# Patient Record
Sex: FEMALE | Race: WHITE | Employment: UNEMPLOYED | URBAN - METROPOLITAN AREA
[De-identification: names, ages, dates, MRNs, and addresses within clinical notes are randomized per-mention and may not be internally consistent; named-entity substitution may affect disease eponyms.]

---

## 2017-01-16 RX ORDER — DEXTROAMPHETAMINE SACCHARATE, AMPHETAMINE ASPARTATE MONOHYDRATE, DEXTROAMPHETAMINE SULFATE AND AMPHETAMINE SULFATE 6.25; 6.25; 6.25; 6.25 MG/1; MG/1; MG/1; MG/1
25 CAPSULE, EXTENDED RELEASE ORAL
Qty: 30 CAP | Refills: 0 | Status: SHIPPED | OUTPATIENT
Start: 2017-01-16 | End: 2017-01-16 | Stop reason: SDUPTHER

## 2017-01-16 RX ORDER — DEXTROAMPHETAMINE SACCHARATE, AMPHETAMINE ASPARTATE MONOHYDRATE, DEXTROAMPHETAMINE SULFATE AND AMPHETAMINE SULFATE 6.25; 6.25; 6.25; 6.25 MG/1; MG/1; MG/1; MG/1
25 CAPSULE, EXTENDED RELEASE ORAL
Qty: 30 CAP | Refills: 0 | Status: SHIPPED | OUTPATIENT
Start: 2017-01-16 | End: 2017-02-17 | Stop reason: SDUPTHER

## 2017-01-16 NOTE — TELEPHONE ENCOUNTER
Last filled 12/16/16  Reviewed report generated by the [place]. Does not demonstrate aberrancies or inconsistencies with regard to the prescribing of controlled medications to this patient by other providers.

## 2017-02-17 RX ORDER — DEXTROAMPHETAMINE SACCHARATE, AMPHETAMINE ASPARTATE MONOHYDRATE, DEXTROAMPHETAMINE SULFATE AND AMPHETAMINE SULFATE 6.25; 6.25; 6.25; 6.25 MG/1; MG/1; MG/1; MG/1
25 CAPSULE, EXTENDED RELEASE ORAL
Qty: 30 CAP | Refills: 0 | Status: SHIPPED | OUTPATIENT
Start: 2017-02-17 | End: 2017-02-20 | Stop reason: SDUPTHER

## 2017-02-17 NOTE — TELEPHONE ENCOUNTER
I have refilled today but she will need appt before additional refills. She is overdue and Dr. Maya Pemberton mailed letter.

## 2017-02-20 RX ORDER — DEXTROAMPHETAMINE SACCHARATE, AMPHETAMINE ASPARTATE MONOHYDRATE, DEXTROAMPHETAMINE SULFATE AND AMPHETAMINE SULFATE 6.25; 6.25; 6.25; 6.25 MG/1; MG/1; MG/1; MG/1
25 CAPSULE, EXTENDED RELEASE ORAL
Qty: 30 CAP | Refills: 0 | Status: SHIPPED | OUTPATIENT
Start: 2017-02-20 | End: 2017-03-21 | Stop reason: SDUPTHER

## 2017-02-20 RX ORDER — DEXTROAMPHETAMINE SACCHARATE, AMPHETAMINE ASPARTATE MONOHYDRATE, DEXTROAMPHETAMINE SULFATE AND AMPHETAMINE SULFATE 6.25; 6.25; 6.25; 6.25 MG/1; MG/1; MG/1; MG/1
25 CAPSULE, EXTENDED RELEASE ORAL
Qty: 30 CAP | Refills: 0 | Status: SHIPPED | OUTPATIENT
Start: 2017-02-20 | End: 2017-02-20 | Stop reason: ALTCHOICE

## 2017-03-22 RX ORDER — DEXTROAMPHETAMINE SACCHARATE, AMPHETAMINE ASPARTATE MONOHYDRATE, DEXTROAMPHETAMINE SULFATE AND AMPHETAMINE SULFATE 6.25; 6.25; 6.25; 6.25 MG/1; MG/1; MG/1; MG/1
25 CAPSULE, EXTENDED RELEASE ORAL
Qty: 30 CAP | Refills: 0 | Status: SHIPPED | OUTPATIENT
Start: 2017-03-22 | End: 2017-04-20 | Stop reason: SDUPTHER

## 2017-03-22 NOTE — TELEPHONE ENCOUNTER
Reviewed report generated by the [facility]. Does not demonstrate aberrancies or inconsistencies with regard to the prescribing of controlled medications to this patient by other providers.   Last filled 2/20/17    I will remind her to make an appt, her last OV was 4/2016 with Marcelo Childers

## 2017-03-22 NOTE — TELEPHONE ENCOUNTER
From: Mimi Tanner  To: Muna Escalera MD  Sent: 3/21/2017 10:49 AM EDT  Subject: Medication Renewal Request    Original authorizing provider: MD Mimi Aguilera would like a refill of the following medications:  amphetamine-dextroamphetamine XR (ADDERALL XR) 25 mg XR capsule Muna Escalera MD]    [facility]  Please call and leave a message when it's ready. :) thanks!!!

## 2017-03-23 RX ORDER — NAPROXEN 375 MG/1
TABLET ORAL
Qty: 60 TAB | Refills: 1 | Status: SHIPPED | OUTPATIENT
Start: 2017-03-23 | End: 2017-08-23 | Stop reason: SDUPTHER

## 2017-04-21 RX ORDER — DEXTROAMPHETAMINE SACCHARATE, AMPHETAMINE ASPARTATE MONOHYDRATE, DEXTROAMPHETAMINE SULFATE AND AMPHETAMINE SULFATE 6.25; 6.25; 6.25; 6.25 MG/1; MG/1; MG/1; MG/1
25 CAPSULE, EXTENDED RELEASE ORAL
Qty: 30 CAP | Refills: 0 | Status: SHIPPED | OUTPATIENT
Start: 2017-04-21 | End: 2017-05-24 | Stop reason: SDUPTHER

## 2017-04-21 NOTE — TELEPHONE ENCOUNTER
From: Aparna Valero  To: Ruba Scanlon MD  Sent: 4/20/2017 11:11 PM EDT  Subject: Medication Renewal Request    Original authorizing provider: MD Aparna Licea would like a refill of the following medications:  amphetamine-dextroamphetamine XR (ADDERALL XR) 25 mg XR capsule Ruba Scanlon MD]    Preferred pharmacy: [facility]:  Requires written rx. Please call and leave a message when it's ready.  I also submitted a request to schedule my physical. Thanks so much!!

## 2017-04-21 NOTE — TELEPHONE ENCOUNTER
Reviewed report generated by the [facility]. Does not demonstrate aberrancies or inconsistencies with regard to the prescribing of controlled medications to this patient by other providers.   Last filled 4/23/17

## 2017-05-10 ENCOUNTER — HOSPITAL ENCOUNTER (OUTPATIENT)
Dept: LAB | Age: 37
Discharge: HOME | End: 2017-05-10
Payer: COMMERCIAL

## 2017-05-10 ENCOUNTER — OFFICE VISIT (OUTPATIENT)
Dept: INTERNAL MEDICINE CLINIC | Age: 37
End: 2017-05-10

## 2017-05-10 VITALS
HEIGHT: 69 IN | BODY MASS INDEX: 20.62 KG/M2 | HEART RATE: 88 BPM | WEIGHT: 139.2 LBS | TEMPERATURE: 98 F | SYSTOLIC BLOOD PRESSURE: 130 MMHG | RESPIRATION RATE: 12 BRPM | OXYGEN SATURATION: 98 % | DIASTOLIC BLOOD PRESSURE: 80 MMHG

## 2017-05-10 DIAGNOSIS — R82.90 ABNORMAL URINALYSIS: ICD-10-CM

## 2017-05-10 DIAGNOSIS — R82.90 ABNORMAL URINE ODOR: ICD-10-CM

## 2017-05-10 DIAGNOSIS — R30.0 DYSURIA: Primary | ICD-10-CM

## 2017-05-10 DIAGNOSIS — R30.0 DYSURIA: ICD-10-CM

## 2017-05-10 LAB
BILIRUB UR QL STRIP: NEGATIVE
GLUCOSE UR-MCNC: NEGATIVE MG/DL
KETONES P FAST UR STRIP-MCNC: NEGATIVE MG/DL
PH UR STRIP: 6 [PH] (ref 4.6–8)
PROT UR QL STRIP: NORMAL MG/DL
SP GR UR STRIP: 1.02 (ref 1–1.03)
UA UROBILINOGEN AMB POC: NORMAL (ref 0.2–1)
URINALYSIS CLARITY POC: CLEAR
URINALYSIS COLOR POC: YELLOW
URINE BLOOD POC: NORMAL
URINE LEUKOCYTES POC: NORMAL
URINE NITRITES POC: NEGATIVE

## 2017-05-10 PROCEDURE — 87186 SC STD MICRODIL/AGAR DIL: CPT | Performed by: PHYSICIAN ASSISTANT

## 2017-05-10 PROCEDURE — 87077 CULTURE AEROBIC IDENTIFY: CPT | Performed by: PHYSICIAN ASSISTANT

## 2017-05-10 PROCEDURE — 87086 URINE CULTURE/COLONY COUNT: CPT | Performed by: PHYSICIAN ASSISTANT

## 2017-05-10 RX ORDER — CIPROFLOXACIN 250 MG/1
250 TABLET, FILM COATED ORAL EVERY 12 HOURS
Qty: 6 TAB | Refills: 0 | Status: SHIPPED | OUTPATIENT
Start: 2017-05-10 | End: 2017-05-13

## 2017-05-10 NOTE — PROGRESS NOTES
HPI/History  Aidan Shields is a 40 y.o.  female who presents for evaluation. Patient reports dysuria and mild intermittent pelvic discomfort usually when urinating for about 5 days. Slightly malodorous urine initially which has now resolved. She took an OTC UTI test which was positive. She denies any significant frequency or urgency and no hematuria, overt CVA tenderness, fevers, or other constitutional sxs. Possible slight vaginal discharge but no other vaginal complaints/sxs. No sexual intercourse since last menstruation and monogamous with her  who had a vasectomy. No other sxs or complaints. Patient Active Problem List   Diagnosis Code    Palpitations R00.2    Allergic rhinitis J30.9    ADD (attention deficit disorder) F98.8    Arthralgia M25.50     Past Medical History:   Diagnosis Date    Allergic rhinitis     Palpitations      History reviewed. No pertinent surgical history. Social History     Social History    Marital status:      Spouse name: N/A    Number of children: N/A    Years of education: N/A     Occupational History    Not on file. Social History Main Topics    Smoking status: Never Smoker    Smokeless tobacco: Not on file    Alcohol use No    Drug use: Not on file    Sexual activity: Not on file     Other Topics Concern    Not on file     Social History Narrative     Family History   Problem Relation Age of Onset    Hypertension Mother      Current Outpatient Prescriptions   Medication Sig    ciprofloxacin HCl (CIPRO) 250 mg tablet Take 1 Tab by mouth every twelve (12) hours for 3 days.  amphetamine-dextroamphetamine XR (ADDERALL XR) 25 mg XR capsule Take 1 Cap (25 mg total) by mouth every morningEarliest Fill Date: 4/21/17. Max Daily Amount: 25 mg    naproxen (NAPROSYN) 375 mg tablet TAKE ONE TABLET BY MOUTH TWICE DAILY WITH MEALS    triamcinolone (NASACORT AQ) 55 mcg nasal inhaler 2 Sprays by Both Nostrils route two (2) times a day.  montelukast (SINGULAIR) 10 mg tablet TAKE 1 TABLET BY MOUTH EVERY EVENING     No current facility-administered medications for this visit. Allergies   Allergen Reactions    Cephalosporins Shortness of Breath and Rash       Review of Systems  Aside from those included in HPI, remainder of ROS negative. Physical Examination  Visit Vitals    /80 (BP 1 Location: Right arm)    Pulse 88    Temp 98 °F (36.7 °C) (Oral)    Resp 12    Ht 5' 9\" (1.753 m)    Wt 139 lb 3.2 oz (63.1 kg)    SpO2 98%    BMI 20.56 kg/m2       General - Alert and in no acute distress. Pt appears well, comfortable, and in good spirits. Pleasant, engaging. Nontoxic. Not anxious, non-diaphoretic. Mental status - Appropriate mood, behavior, speech content, dress, and thought processes. Pulm - No tachypnea, retractions, or cyanosis. Good respiratory effort. Cardiovascular - Normal rate. UA today: 2+ blood, 1+ leuk, neg nitr. Assessment and Plan  1. Dysuria, malodorous urine - UA above, will treat with cipro and send for cx. Increase fluids and other supportive measures as needed. Further planning pending results and progression. Return/call if needed or developments. Pt happily agrees with plan. Pt has not been seen in quite some time but has appt with Dr. Robb Banuelos in August, which she will keep. PLEASE NOTE:   This document has been produced using voice recognition software. Unrecognized errors in transcription may be present.     Julián Lewis [employer] [facility]  [phone]  5/10/2017

## 2017-05-10 NOTE — MR AVS SNAPSHOT
Visit Information Date & Time Provider Department Dept. Phone Encounter #  
 5/10/2017 11:30 AM C[place] Paz, [address] Internist of [facility] [phone] Your Appointments 8/3/2017  9:55 AM  
LAB with C NURSE VISIT Internist of [facility] ([address]) Appt Note: lab  
 [address], [address] [place] [address] [facility] [address] [place]  
  
   
 [address], [address]  
  
    
 8/10/2017 10:30 AM  
PHYSICAL with Tamie Edmonds MD  
Internist of [facility] [address] [place] [address]) Appt Note: physical  
 [address] [facility], [address] [place] [address] [address] [place]  
  
   
 [address], [address] Upcoming Health Maintenance Date Due DTaP/Tdap/Td series (1 - Tdap) 10/20/2005 PAP AKA CERVICAL CYTOLOGY 5/17/2016 INFLUENZA AGE 9 TO ADULT 8/1/2017 Allergies as of 5/10/2017  Review Complete On: 4/4/2016 By: JORGE LUIS Unger Severity Noted Reaction Type Reactions Cephalosporins High   Shortness of Breath, Rash Current Immunizations  Never Reviewed Name Date  
 TD Vaccine 10/19/2005 Not reviewed this visit You Were Diagnosed With   
  
 Codes Comments Dysuria    -  Primary ICD-10-CM: R30.0 ICD-9-CM: 772. 1 Abnormal urine odor     ICD-10-CM: R82.90 ICD-9-CM: 791.9 Abnormal urinalysis     ICD-10-CM: R82.90 ICD-9-CM: 791.9 Vitals BP Pulse Temp Resp Height(growth percentile) Weight(growth percentile) 130/80 (BP 1 Location: Right arm) 88 98 °F (36.7 °C) (Oral) 12 5' 9\" (1.753 m) 139 lb 3.2 oz (63.1 kg) SpO2 BMI OB Status Smoking Status 98% 20.56 kg/m2 Having regular periods Never Smoker Vitals History BMI and BSA Data Body Mass Index Body Surface Area 20.56 kg/m 2 1.75 m 2 Preferred Pharmacy Pharmacy Name Phone [facility], [address] [place] [address] [phone] Your Updated Medication List  
  
   
This list is accurate as of: 5/10/17 11:58 AM.  Always use your most recent med list.  
  
  
  
  
 amphetamine-dextroamphetamine XR 25 mg XR capsule Commonly known as:  ADDERALL XR Take 1 Cap (25 mg total) by mouth every morningEarliest Fill Date: 4/21/17. Max Daily Amount: 25 mg  
  
 montelukast 10 mg tablet Commonly known as:  SINGULAIR  
TAKE 1 TABLET BY MOUTH EVERY EVENING  
  
 naproxen 375 mg tablet Commonly known as:  NAPROSYN  
TAKE ONE TABLET BY MOUTH TWICE DAILY WITH MEALS  
  
 triamcinolone 55 mcg nasal inhaler Commonly known as:  NASACORT AQ  
2 Sprays by Both Nostrils route two (2) times a day. Introducing \[facility]\"" & HEALTH SERVICES! Dear Facundo Calloway: Thank you for requesting a [employer] account. Our records indicate that you already have an active [employer] account. You can access your account anytime at [email or web address] Did you know that you can access your hospital and ER discharge instructions at any time in [employer]? You can also review all of your test results from your hospital stay or ER visit. Additional Information If you have questions, please visit the Frequently Asked Questions section of the [employer] website at [email or web address] Remember, [employer] is NOT to be used for urgent needs. For medical emergencies, dial 911. Now available from your iPhone and Android! Please provide this summary of care documentation to your next provider. Your primary care clinician is listed as Armando John. If you have any questions after today's visit, please call [phone].

## 2017-05-12 ENCOUNTER — TELEPHONE (OUTPATIENT)
Dept: INTERNAL MEDICINE CLINIC | Age: 37
End: 2017-05-12

## 2017-05-12 LAB
BACTERIA SPEC CULT: ABNORMAL
SERVICE CMNT-IMP: ABNORMAL

## 2017-05-24 RX ORDER — DEXTROAMPHETAMINE SACCHARATE, AMPHETAMINE ASPARTATE MONOHYDRATE, DEXTROAMPHETAMINE SULFATE AND AMPHETAMINE SULFATE 6.25; 6.25; 6.25; 6.25 MG/1; MG/1; MG/1; MG/1
25 CAPSULE, EXTENDED RELEASE ORAL
Qty: 30 CAP | Refills: 0 | Status: SHIPPED | OUTPATIENT
Start: 2017-05-24 | End: 2017-06-21 | Stop reason: SDUPTHER

## 2017-05-24 NOTE — TELEPHONE ENCOUNTER
From: Eva Card  To: Coreen Bueno MD  Sent: 5/24/2017 9:54 AM EDT  Subject: Medication Renewal Request    Original authorizing provider: MD Eva Orlando would like a refill of the following medications:  amphetamine-dextroamphetamine XR (ADDERALL XR) 25 mg XR capsule Coreen Bueno MD]    Preferred pharmacy: [facility] [place], [address]:  Written rx needed. Please call me when it's ready. Thank you so much!

## 2017-05-24 NOTE — TELEPHONE ENCOUNTER
Reviewed report generated by the [facility]. Does not demonstrate aberrancies or inconsistencies with regard to the prescribing of controlled medications to this patient by other providers. Last filled Adderall 4/25/2017 as per .

## 2017-06-21 RX ORDER — DEXTROAMPHETAMINE SACCHARATE, AMPHETAMINE ASPARTATE MONOHYDRATE, DEXTROAMPHETAMINE SULFATE AND AMPHETAMINE SULFATE 6.25; 6.25; 6.25; 6.25 MG/1; MG/1; MG/1; MG/1
25 CAPSULE, EXTENDED RELEASE ORAL
Qty: 30 CAP | Refills: 0 | Status: SHIPPED | OUTPATIENT
Start: 2017-06-21 | End: 2017-07-27 | Stop reason: SDUPTHER

## 2017-06-21 NOTE — TELEPHONE ENCOUNTER
Last filled 5/24/17  Reviewed report generated by the [facility]. Does not demonstrate aberrancies or inconsistencies with regard to the prescribing of controlled medications to this patient by other providers.

## 2017-06-21 NOTE — TELEPHONE ENCOUNTER
From: Ulysses Odea  To: Sheridan Joseph MD  Sent: 6/21/2017 10:31 AM EDT  Subject: Medication Renewal Request    Original authorizing provider: MD Fransisco Benites. [facility] would like a refill of the following medications:  amphetamine-dextroamphetamine XR (ADDERALL XR) 25 mg XR capsule Sheridan Joseph MD]    Preferred pharmacy: [address]:  Will pick it up when it's ready. :) thanks!

## 2017-07-27 RX ORDER — DEXTROAMPHETAMINE SACCHARATE, AMPHETAMINE ASPARTATE MONOHYDRATE, DEXTROAMPHETAMINE SULFATE AND AMPHETAMINE SULFATE 6.25; 6.25; 6.25; 6.25 MG/1; MG/1; MG/1; MG/1
25 CAPSULE, EXTENDED RELEASE ORAL
Qty: 30 CAP | Refills: 0 | Status: SHIPPED | OUTPATIENT
Start: 2017-07-27 | End: 2017-09-11 | Stop reason: SDUPTHER

## 2017-07-27 NOTE — TELEPHONE ENCOUNTER
From: Shelly Gaspar  To: Nimco Sepulveda MD  Sent: 7/27/2017 12:09 AM EDT  Subject: Medication Renewal Request    Original authorizing provider: Nimco Sepulveda MD    [facility] [place] [place] would like a refill of the following medications:  amphetamine-dextroamphetamine XR (ADDERALL XR) 25 mg XR capsule Nimco Sepulveda MD]    [facility] [address] [facility]  Leave me a message when it's ready to be picked up please. :) Thank you!

## 2017-07-27 NOTE — TELEPHONE ENCOUNTER
Last filled 6/21/17  Reviewed report generated by the [facility]. Does not demonstrate aberrancies or inconsistencies with regard to the prescribing of controlled medications to this patient by other providers.

## 2017-08-02 ENCOUNTER — DOCUMENTATION ONLY (OUTPATIENT)
Dept: INTERNAL MEDICINE CLINIC | Age: 37
End: 2017-08-02

## 2017-08-23 RX ORDER — NAPROXEN 375 MG/1
TABLET ORAL
Qty: 60 TAB | Refills: 1 | Status: SHIPPED | OUTPATIENT
Start: 2017-08-23 | End: 2018-02-04 | Stop reason: SDUPTHER

## 2017-09-11 NOTE — TELEPHONE ENCOUNTER
From: Buzz Duong  To: JORGE LUIS Vela  Sent: 9/11/2017 11:03 AM EDT  Subject: Medication Renewal Request    Original authorizing provider: JORGE LUIS Vela. Kanchan Johnson would like a refill of the following medications:  amphetamine-dextroamphetamine XR (ADDERALL XR) 25 mg XR capsule [JORGE LUIS King]    Preferred pharmacy: [facility] [address]:  New appointment is scheduled. Written rx, thank you!

## 2017-09-12 RX ORDER — DEXTROAMPHETAMINE SACCHARATE, AMPHETAMINE ASPARTATE MONOHYDRATE, DEXTROAMPHETAMINE SULFATE AND AMPHETAMINE SULFATE 6.25; 6.25; 6.25; 6.25 MG/1; MG/1; MG/1; MG/1
25 CAPSULE, EXTENDED RELEASE ORAL
Qty: 30 CAP | Refills: 0 | Status: SHIPPED | OUTPATIENT
Start: 2017-09-12 | End: 2017-10-04 | Stop reason: SDUPTHER

## 2017-09-12 NOTE — TELEPHONE ENCOUNTER
Reviewed report generated by the [facility]. Does not demonstrate aberrancies or inconsistencies with regard to the prescribing of controlled medications to this patient by other providers. Last filled Adderall 7/27/2017 per .

## 2017-09-21 ENCOUNTER — TELEPHONE (OUTPATIENT)
Dept: INTERNAL MEDICINE CLINIC | Age: 37
End: 2017-09-21

## 2017-09-21 DIAGNOSIS — Z00.00 ROUTINE PHYSICAL EXAMINATION: ICD-10-CM

## 2017-09-21 DIAGNOSIS — R00.2 PALPITATIONS: Primary | ICD-10-CM

## 2017-09-21 DIAGNOSIS — M25.50 ARTHRALGIA, UNSPECIFIED JOINT: ICD-10-CM

## 2017-09-26 ENCOUNTER — HOSPITAL ENCOUNTER (OUTPATIENT)
Dept: LAB | Age: 37
Discharge: HOME | End: 2017-09-26
Payer: COMMERCIAL

## 2017-09-26 DIAGNOSIS — M25.50 ARTHRALGIA, UNSPECIFIED JOINT: ICD-10-CM

## 2017-09-26 DIAGNOSIS — Z00.00 ROUTINE PHYSICAL EXAMINATION: ICD-10-CM

## 2017-09-26 DIAGNOSIS — R00.2 PALPITATIONS: ICD-10-CM

## 2017-09-26 LAB
ALBUMIN SERPL-MCNC: 4.1 G/DL (ref 3.4–5)
ALBUMIN/GLOB SERPL: 1.2 {RATIO} (ref 0.8–1.7)
ALP SERPL-CCNC: 63 U/L (ref 45–117)
ALT SERPL-CCNC: 21 U/L (ref 13–56)
ANION GAP SERPL CALC-SCNC: 8 MMOL/L (ref 3–18)
APPEARANCE UR: CLEAR
AST SERPL-CCNC: 16 U/L (ref 15–37)
BACTERIA URNS QL MICRO: NEGATIVE /HPF
BASOPHILS # BLD: 0 K/UL (ref 0–0.06)
BASOPHILS NFR BLD: 0 % (ref 0–2)
BILIRUB SERPL-MCNC: 0.5 MG/DL (ref 0.2–1)
BILIRUB UR QL: NEGATIVE
BUN SERPL-MCNC: 12 MG/DL (ref 7–18)
BUN/CREAT SERPL: 13 (ref 12–20)
CALCIUM SERPL-MCNC: 8.7 MG/DL (ref 8.5–10.1)
CHLORIDE SERPL-SCNC: 105 MMOL/L (ref 100–108)
CHOLEST SERPL-MCNC: 162 MG/DL
CO2 SERPL-SCNC: 28 MMOL/L (ref 21–32)
COLOR UR: YELLOW
CREAT SERPL-MCNC: 0.92 MG/DL (ref 0.6–1.3)
DIFFERENTIAL METHOD BLD: NORMAL
EOSINOPHIL # BLD: 0.1 K/UL (ref 0–0.4)
EOSINOPHIL NFR BLD: 2 % (ref 0–5)
EPITH CASTS URNS QL MICRO: NORMAL /LPF (ref 0–5)
ERYTHROCYTE [DISTWIDTH] IN BLOOD BY AUTOMATED COUNT: 13.8 % (ref 11.6–14.5)
GLOBULIN SER CALC-MCNC: 3.5 G/DL (ref 2–4)
GLUCOSE SERPL-MCNC: 87 MG/DL (ref 74–99)
GLUCOSE UR STRIP.AUTO-MCNC: NEGATIVE MG/DL
HCT VFR BLD AUTO: 42.5 % (ref 35–45)
HDLC SERPL-MCNC: 69 MG/DL (ref 40–60)
HDLC SERPL: 2.3 {RATIO} (ref 0–5)
HGB BLD-MCNC: 14.1 G/DL (ref 12–16)
HGB UR QL STRIP: NEGATIVE
KETONES UR QL STRIP.AUTO: NEGATIVE MG/DL
LDLC SERPL CALC-MCNC: 81.8 MG/DL (ref 0–100)
LEUKOCYTE ESTERASE UR QL STRIP.AUTO: NEGATIVE
LIPID PROFILE,FLP: ABNORMAL
LYMPHOCYTES # BLD: 1.4 K/UL (ref 0.9–3.6)
LYMPHOCYTES NFR BLD: 22 % (ref 21–52)
MCH RBC QN AUTO: 29 PG (ref 24–34)
MCHC RBC AUTO-ENTMCNC: 33.2 G/DL (ref 31–37)
MCV RBC AUTO: 87.4 FL (ref 74–97)
MONOCYTES # BLD: 0.4 K/UL (ref 0.05–1.2)
MONOCYTES NFR BLD: 6 % (ref 3–10)
NEUTS SEG # BLD: 4.3 K/UL (ref 1.8–8)
NEUTS SEG NFR BLD: 70 % (ref 40–73)
NITRITE UR QL STRIP.AUTO: NEGATIVE
PH UR STRIP: 5.5 [PH] (ref 5–8)
PLATELET # BLD AUTO: 214 K/UL (ref 135–420)
PMV BLD AUTO: 11.3 FL (ref 9.2–11.8)
POTASSIUM SERPL-SCNC: 4.3 MMOL/L (ref 3.5–5.5)
PROT SERPL-MCNC: 7.6 G/DL (ref 6.4–8.2)
PROT UR STRIP-MCNC: NEGATIVE MG/DL
RBC # BLD AUTO: 4.86 M/UL (ref 4.2–5.3)
RBC #/AREA URNS HPF: 0 /HPF (ref 0–5)
SODIUM SERPL-SCNC: 141 MMOL/L (ref 136–145)
SP GR UR REFRACTOMETRY: 1.02 (ref 1–1.03)
TRIGL SERPL-MCNC: 56 MG/DL (ref ?–150)
TSH SERPL DL<=0.05 MIU/L-ACNC: 1.63 UIU/ML (ref 0.36–3.74)
UROBILINOGEN UR QL STRIP.AUTO: 0.2 EU/DL (ref 0.2–1)
VLDLC SERPL CALC-MCNC: 11.2 MG/DL
WBC # BLD AUTO: 6.2 K/UL (ref 4.6–13.2)
WBC URNS QL MICRO: NORMAL /HPF (ref 0–4)

## 2017-09-26 PROCEDURE — 84443 ASSAY THYROID STIM HORMONE: CPT | Performed by: INTERNAL MEDICINE

## 2017-09-26 PROCEDURE — 81001 URINALYSIS AUTO W/SCOPE: CPT | Performed by: INTERNAL MEDICINE

## 2017-09-26 PROCEDURE — 86235 NUCLEAR ANTIGEN ANTIBODY: CPT | Performed by: INTERNAL MEDICINE

## 2017-09-26 PROCEDURE — 80053 COMPREHEN METABOLIC PANEL: CPT | Performed by: INTERNAL MEDICINE

## 2017-09-26 PROCEDURE — 86038 ANTINUCLEAR ANTIBODIES: CPT | Performed by: INTERNAL MEDICINE

## 2017-09-26 PROCEDURE — 85025 COMPLETE CBC W/AUTO DIFF WBC: CPT | Performed by: INTERNAL MEDICINE

## 2017-09-26 PROCEDURE — 86225 DNA ANTIBODY NATIVE: CPT | Performed by: INTERNAL MEDICINE

## 2017-09-26 PROCEDURE — 82306 VITAMIN D 25 HYDROXY: CPT | Performed by: INTERNAL MEDICINE

## 2017-09-26 PROCEDURE — 80061 LIPID PANEL: CPT | Performed by: INTERNAL MEDICINE

## 2017-09-26 PROCEDURE — 36415 COLL VENOUS BLD VENIPUNCTURE: CPT | Performed by: INTERNAL MEDICINE

## 2017-09-27 LAB
25(OH)D3 SERPL-MCNC: 26.4 NG/ML (ref 30–100)
ANA SER QL: POSITIVE
ANTICHROMATIN ABS, ACHRLT: 0.3 AI (ref 0–0.9)
DSDNA AB SER-ACNC: <1 IU/ML (ref 0–9)
ENA SM AB SER-ACNC: <0.2 AI (ref 0–0.9)
ENA SM+RNP AB SER-ACNC: <0.2 AI (ref 0–0.9)
ENA SS-A AB SER-ACNC: <0.2 AI (ref 0–0.9)
ENA SS-B AB SER-ACNC: <0.2 AI (ref 0–0.9)
RNP ABS, RNPRLT: <0.2 AI (ref 0–0.9)
SCLERODERMA AB, RSCLT: 4.8 AI (ref 0–0.9)
SEE BELOW:, 164879: ABNORMAL

## 2017-10-03 RX ORDER — TRIAMCINOLONE ACETONIDE 55 UG/1
SPRAY, METERED NASAL
Qty: 1 BOTTLE | Refills: 2 | Status: SHIPPED | OUTPATIENT
Start: 2017-10-03 | End: 2017-11-01 | Stop reason: SDUPTHER

## 2017-10-04 ENCOUNTER — OFFICE VISIT (OUTPATIENT)
Dept: INTERNAL MEDICINE CLINIC | Age: 37
End: 2017-10-04

## 2017-10-04 VITALS
HEART RATE: 67 BPM | DIASTOLIC BLOOD PRESSURE: 82 MMHG | OXYGEN SATURATION: 98 % | SYSTOLIC BLOOD PRESSURE: 122 MMHG | BODY MASS INDEX: 20.88 KG/M2 | TEMPERATURE: 98.9 F | HEIGHT: 69 IN | WEIGHT: 141 LBS | RESPIRATION RATE: 18 BRPM

## 2017-10-04 DIAGNOSIS — Z12.4 SCREENING FOR CERVICAL CANCER: ICD-10-CM

## 2017-10-04 DIAGNOSIS — Z23 ENCOUNTER FOR IMMUNIZATION: ICD-10-CM

## 2017-10-04 DIAGNOSIS — F98.8 ATTENTION DEFICIT DISORDER, UNSPECIFIED HYPERACTIVITY PRESENCE: ICD-10-CM

## 2017-10-04 DIAGNOSIS — G89.29 CHRONIC BILATERAL LOW BACK PAIN, WITH SCIATICA PRESENCE UNSPECIFIED: Primary | ICD-10-CM

## 2017-10-04 DIAGNOSIS — R76.8 POSITIVE ANA (ANTINUCLEAR ANTIBODY): ICD-10-CM

## 2017-10-04 DIAGNOSIS — M54.5 CHRONIC BILATERAL LOW BACK PAIN, WITH SCIATICA PRESENCE UNSPECIFIED: Primary | ICD-10-CM

## 2017-10-04 DIAGNOSIS — J30.2 CHRONIC SEASONAL ALLERGIC RHINITIS, UNSPECIFIED TRIGGER: ICD-10-CM

## 2017-10-04 DIAGNOSIS — R00.2 PALPITATIONS: ICD-10-CM

## 2017-10-04 DIAGNOSIS — I73.00 RAYNAUD'S PHENOMENON WITHOUT GANGRENE: ICD-10-CM

## 2017-10-04 RX ORDER — DEXTROAMPHETAMINE SACCHARATE, AMPHETAMINE ASPARTATE MONOHYDRATE, DEXTROAMPHETAMINE SULFATE AND AMPHETAMINE SULFATE 6.25; 6.25; 6.25; 6.25 MG/1; MG/1; MG/1; MG/1
25 CAPSULE, EXTENDED RELEASE ORAL
Qty: 30 CAP | Refills: 0 | Status: SHIPPED | OUTPATIENT
Start: 2017-10-04 | End: 2017-11-13 | Stop reason: SDUPTHER

## 2017-10-04 NOTE — PROGRESS NOTES
Reviewed report generated by the [facility]. Does not demonstrate aberrancies or inconsistencies with regard to the prescribing of controlled medications to this patient by other providers. Last filled Adderall 9/12/2017 per . Given refill with instructions not to fill until 10/12/2017.

## 2017-10-04 NOTE — MR AVS SNAPSHOT
Visit Information Date & Time Provider Department Dept. Phone Encounter #  
 10/4/2017  9:00 AM Matt Holm MD [facility]ist [facility]  Follow-up Instructions Return in about 1 year (around 10/4/2018), or if symptoms worsen or fail to improve. Upcoming Health Maintenance Date Due DTaP/Tdap/Td series (1 - Tdap) 10/20/2005 PAP AKA CERVICAL CYTOLOGY 5/17/2016 INFLUENZA AGE 9 TO ADULT 8/1/2017 Allergies as of 10/4/2017  Review Complete On: 10/4/2017 By: Ai Carter Severity Noted Reaction Type Reactions Cephalosporins High   Shortness of Breath, Rash Current Immunizations  Never Reviewed Name Date  
 TD Vaccine 10/19/2005 Tdap  Incomplete Not reviewed this visit You Were Diagnosed With   
  
 Codes Comments Encounter for immunization    -  Primary ICD-10-CM: H54 ICD-9-CM: V03.89 Positive ALIS (antinuclear antibody)     ICD-10-CM: R76.8 ICD-9-CM: 795.79 Chronic low back pain with bilateral sciatica, unspecified back pain laterality     ICD-10-CM: M54.41, G89.29, M54.42 
ICD-9-CM: 724.2, 338.29 Vitals BP Pulse Temp Resp Height(growth percentile) Weight(growth percentile) 122/82 (BP 1 Location: Left arm, BP Patient Position: Sitting) 67 98.9 °F (37.2 °C) (Oral) 18 5' 9\" (1.753 m) 141 lb (64 kg) LMP SpO2 BMI OB Status Smoking Status 09/08/2017 (Exact Date) 98% 20.82 kg/m2 Having regular periods Never Smoker Vitals History BMI and BSA Data Body Mass Index Body Surface Area  
 20.82 kg/m 2 1.77 m 2 Preferred Pharmacy Pharmacy Name Phone [facility], [address] [place] [address] [phone] Your Updated Medication List  
  
   
This list is accurate as of: 10/4/17 10:13 AM.  Always use your most recent med list.  
  
  
  
  
 amphetamine-dextroamphetamine XR 25 mg XR capsule Commonly known as:  ADDERALL XR  
 Take 1 Cap (25 mg total) by mouth every morning. Max Daily Amount: 25 mg  
  
 montelukast 10 mg tablet Commonly known as:  SINGULAIR  
TAKE 1 TABLET BY MOUTH EVERY EVENING  
  
 naproxen 375 mg tablet Commonly known as:  NAPROSYN  
TAKE ONE TABLET BY MOUTH TWICE DAILY WITH MEALS  
  
 triamcinolone 55 mcg nasal inhaler Commonly known as:  NASACORT AQ  
USE 2 SPRAYS IN EACH NOSTRIL 2 TIMES A DAY Prescriptions Printed Refills  
 amphetamine-dextroamphetamine XR (ADDERALL XR) 25 mg XR capsule 0 Sig: Take 1 Cap (25 mg total) by mouth every morning. Max Daily Amount: 25 mg  
 Class: Print Route: Oral  
  
We Performed the Following TETANUS, DIPHTHERIA TOXOIDS AND ACELLULAR PERTUSSIS VACCINE (TDAP), IN INDIVIDS. >=7, IM [id number] CPT(R)] Follow-up Instructions Return in about 1 year (around 10/4/2018), or if symptoms worsen or fail to improve. To-Do List   
 10/04/2017 Lab:  C REACTIVE PROTEIN, QT   
  
 10/04/2017 Lab:  CYCLIC CITRUL PEPTIDE AB, IGG   
  
 10/04/2017 Lab:  [id number]   
  
 10/04/2017 Lab:  RHEUMATOID FACTOR, QL   
  
 10/04/2017 Lab:  SED RATE (ESR) Patient Instructions Advance Care Planning: Care Instructions Your Care Instructions It can be hard to live with an illness that cannot be cured. But if your health is getting worse, you may want to make decisions about end-of-life care. Planning for the end of your life does not mean that you are giving up. It is a way to make sure that your wishes are met. Clearly stating your wishes can make it easier for your loved ones. Making plans while you are still able may also ease your mind and make your final days less stressful and more meaningful. Follow-up care is a key part of your treatment and safety. Be sure to make and go to all appointments, and call your doctor if you are having problems.  It's also a good idea to know your test results and keep a list of the medicines you take. What can you do to plan for the end of life? · You can bring these issues up with your doctor. You do not need to wait until your doctor starts the conversation. You might start with \"I would not be willing to live with . Mlmya Alcala Mliss Alcala Mlmya Alcala \" When you complete this sentence it helps your doctor understand your wishes. · Talk openly and honestly with your doctor. This is the best way to understand the decisions you will need to make as your health changes. Know that you can always change your mind. · Ask your doctor about commonly used life-support measures. These include tube feedings, breathing machines, and fluids given through a vein (IV). Understanding these treatments will help you decide whether you want them. · You may choose to have these life-supporting treatments for a limited time. This allows a trial period to see whether they will help you. You may also decide that you want your doctor to take only certain measures to keep you alive. It is important to spell out these conditions so that your doctor and family understand them. · Talk to your doctor about how long you are likely to live. He or she may be able to give you an idea of what usually happens with your specific illness. · Think about preparing papers that state your wishes. This way there will not be any confusion about what you want. You can change your instructions at any time. Which papers should you prepare? Advance directives are legal papers that tell doctors how you want to be cared for at the end of your life. You do not need a  to write these papers. Ask your doctor or your state health department for information on how to write your advance directives. They may have the forms for each of these types of papers. Make sure your doctor has a copy of these on file, and give a copy to a family member or close friend. · Consider a do-not-resuscitate order (DNR).  This order asks that no extra treatments be done if your heart stops or you stop breathing. Extra treatments may include cardiopulmonary resuscitation (CPR), electrical shock to restart your heart, or a machine to breathe for you. If you decide to have a DNR order, ask your doctor to explain and write it. Place the order in your home where everyone can easily see it. · Consider a living will. A living will explains your wishes about life support and other treatments at the end of your life if you become unable to speak for yourself. Living gannon tell doctors to use or not use treatments that would keep you alive. You must have one or two witnesses or a notary present when you sign this form. · Consider a durable power of  for health care. This allows you to name a person to make decisions about your care if you are not able to. Most people ask a close friend or family member. Talk to this person about the kinds of treatments you want and those that you do not want. Make sure this person understands your wishes. These legal papers are simple to change. Tell your doctor what you want to change, and ask him or her to make a note in your medical file. Give your family updated copies of the papers. Where can you learn more? Go to http://erica-venice.info/. Enter P184 in the search box to learn more about \"Advance Care Planning: Care Instructions. \" Current as of: November 17, 2016 Content Version: 11.3 © 9105-5492 [employer]. Care instructions adapted under license by [employer] (which disclaims liability or warranty for this information). If you have questions about a medical condition or this instruction, always ask your healthcare professional. [place] [address] any warranty or liability for your use of this information. Advance Directives: Care Instructions Your Care Instructions An advance directive is a legal way to state your wishes at the end of your life. It tells your family and your doctor what to do if you can no longer say what you want. There are two main types of advance directives. You can change them any time that your wishes change. · A living will tells your family and your doctor your wishes about life support and other treatment. · A durable power of  for health care lets you name a person to make treatment decisions for you when you can't speak for yourself. This person is called a health care agent. If you do not have an advance directive, decisions about your medical care may be made by a doctor or a  who doesn't know you. It may help to think of an advance directive as a gift to the people who care for you. If you have one, they won't have to make tough decisions by themselves. Follow-up care is a key part of your treatment and safety. Be sure to make and go to all appointments, and call your doctor if you are having problems. It's also a good idea to know your test results and keep a list of the medicines you take. How can you care for yourself at home? · Discuss your wishes with your loved ones and your doctor. This way, there are no surprises. · Many states have a unique form. Or you might use a universal form that has been approved by many states. This kind of form can sometimes be completed and stored online. Your electronic copy will then be available wherever you have a connection to the Internet. In most cases, doctors will respect your wishes even if you have a form from a different state. · You don't need a  to do an advance directive. But you may want to get legal advice. · Think about these questions when you prepare an advance directive: ¨ Who do you want to make decisions about your medical care if you are not able to? Many people choose a family member or close friend. ¨ Do you know enough about life support methods that might be used? If not, talk to your doctor so you understand. ¨ What are you most afraid of that might happen? You might be afraid of having pain, losing your independence, or being kept alive by machines. ¨ Where would you prefer to die? Choices include your home, a hospital, or a nursing home. ¨ Would you like to have information about hospice care to support you and your family? ¨ Do you want to donate organs when you die? ¨ Do you want certain Yarsanism practices performed before you die? If so, put your wishes in the advance directive. · Read your advance directive every year, and make changes as needed. When should you call for help? Be sure to contact your doctor if you have any questions. Where can you learn more? Go to http://erica-venice.info/. Enter R264 in the search box to learn more about \"Advance Directives: Care Instructions. \" Current as of: November 17, 2016 Content Version: 11.3 © 1141-8705 [employer]. Care instructions adapted under license by [employer] (which disclaims liability or warranty for this information). If you have questions about a medical condition or this instruction, always ask your healthcare professional. [place] [address] any warranty or liability for your use of this information. Learning About Healthy Weight What is a healthy weight? A healthy weight is the weight at which you feel good about yourself and have energy for work and play. It's also one that lowers your risk for health problems. What can you do to stay at a healthy weight? It can be hard to stay at a healthy weight, especially when fast food, vending-machine snacks, and processed foods are so easy to find. And with your busy lifestyle, activity may be low on your list of things to do. But staying at a healthy weight may be easier than you think. Here are some dos and don'ts for staying at a healthy weight: 
Do eat healthy foods The kinds of foods you eat have a big impact on both your weight and your health. Reaching and staying at a healthy weight is not about going on a diet. It's about making healthier food choices every day and changing your diet for good. Healthy eating means eating a variety of foods so that you get all the nutrients you need. Your body needs protein, carbohydrate, and fats for energy. They keep your heart beating, your brain active, and your muscles working. On most days, try to eat from each food group. This means eating a variety of: · Whole grains, such as whole wheat breads and pastas. · Fruits and vegetables. · Dairy products, such as low-fat milk, yogurt, and cheese. · Lean proteins, such as all types of fish, chicken without the skin, and beans. Don't have too much or too little of one thing. All foods, if eaten in moderation, can be part of healthy eating. Even sweets can be okay. If your favorite foods are high in fat, salt, sugar, or calories, limit how often you eat them. Eat smaller servings, or look for healthy substitutes. Do watch what you eat Many people eat more than their bodies need. Part of staying at a healthy weight means learning how much food you really need from day to day and not eating more than that. Even with healthy foods, eating too much can make you gain weight. Having a well-balanced diet means that you eat enough, but not too much, and that your food gives you the nutrients you need to stay healthy. So listen to your body. Eat when you're hungry. Stop when you feel satisfied. It's a good idea to have healthy snacks ready for when you get hungry. Keep healthy snacks with you at work, in your car, and at home. If you have a healthy snack easily available, you'll be less likely to pick a candy bar or bag of chips from a vending machine instead.  
Some healthy snacks you might want to keep on hand are fruit, low-fat yogurt, string cheese, low-fat microwave popcorn, raisins and other dried fruit, nuts, whole wheat crackers, pretzels, carrots, celery sticks, and broccoli. Do some physical activity A big part of reaching and staying at a healthy weight is being active. When you're active, you burn calories. This makes it easier to reach and stay at a healthy weight. When you're active on a regular basis, your body burns more calories, even when you're at rest. Being active helps you lose fat and build lean muscle. Try to be active for at least 1 hour every day. This may sound like a lot, but it's okay to be active in smaller blocks of time that add up to 1 hour a day. Any activity that makes your heart beat faster and keeps it there for a while counts. A brisk walk, run, or swim will get your heart beating faster. So will climbing stairs, shooting baskets, or cycling. Even some household chores like vacuuming and mowing the lawn will get your heart rate up. Pick activities that you enjoyones that make your heart beat faster, your muscles stronger, and your muscles and joints more flexible. If you find more than one thing you like doing, do them all. You don't have to do the same thing every day. Don't diet Diets don't work. Diets are temporary. Because you give up so much when you diet, you may be hungry and think about food all the time. And after you stop dieting, you also may overeat to make up for what you missed. Most people who diet end up gaining back the pounds they lostand more. Remember that healthy bodies come in lots of shapes and sizes. Everyone can get healthier by eating better and being more active. Where can you learn more? Go to http://erica-venice.info/. Enter 202 1746 in the search box to learn more about \"Learning About Healthy Weight. \" Current as of: October 13, 2016 Content Version: 11.3 © 3721-9215 [employer].  Care instructions adapted under license by [address] (which disclaims liability or warranty for this information). If you have questions about a medical condition or this instruction, always ask your healthcare professional. [address] any warranty or liability for your use of this information. Introducing [facility]! Dear Chrissy Lewis: Thank you for requesting a [employer] account. Our records indicate that you already have an active [employer] account. You can access your account anytime at [email or web address] Did you know that you can access your hospital and ER discharge instructions at any time in [employer]? You can also review all of your test results from your hospital stay or ER visit. Additional Information If you have questions, please visit the Frequently Asked Questions section of the [employer] website at [email or web address] Remember, [employer] is NOT to be used for urgent needs. For medical emergencies, dial 911. Now available from your iPhone and Android! Please provide this summary of care documentation to your next provider. Your primary care clinician is listed as Lidiageno Olea. If you have any questions after today's visit, please call [phone].

## 2017-10-04 NOTE — PROGRESS NOTES
Chief Complaint   Patient presents with    Complete Physical     Yearly physical with labs. Health Maintenance Due   Topic Date Due    DTaP/Tdap/Td series (1 - Tdap) 10/20/2005    PAP AKA CERVICAL CYTOLOGY  05/17/2016    INFLUENZA AGE 9 TO ADULT  08/01/2017       1. Have you been to the ER, urgent care clinic or hospitalized since your last visit? YES. Patient states she went to [facility] ER around 6-8 months ago for heart palpitations. 2. Have you seen or consulted any other health care providers outside of the [employer] [address] [place] [address] since your last visit (Include any pap smears or colon screening)? YES,Patient states she saw the Neurologist 4 months ago and chiropractor 5 days ago. Do you have an Advanced Directive? NO    Would you like information on Advanced Directives? YES    Patient states she knows that she is over due for a PAP and hasn't had one since 2 years ago, and would need to find a new GYN doctor. Patient states she doesn't want an influenza vaccine today and usually doesn't get a flu vaccine. Patient given TDAP vaccine in right deltoid . Instructed patient to sit and wait 10-20 minutes before leaving the premises so that we can watch for any complications or adverse reactions. Patient given vaccine information statement handout before vaccine was given. Patient tolerated well without adverse reactions or complications. Patient's labs drawn from left antecubital area without complications.

## 2017-10-04 NOTE — PATIENT INSTRUCTIONS
Advance Care Planning: Care Instructions  Your Care Instructions  It can be hard to live with an illness that cannot be cured. But if your health is getting worse, you may want to make decisions about end-of-life care. Planning for the end of your life does not mean that you are giving up. It is a way to make sure that your wishes are met. Clearly stating your wishes can make it easier for your loved ones. Making plans while you are still able may also ease your mind and make your final days less stressful and more meaningful. Follow-up care is a key part of your treatment and safety. Be sure to make and go to all appointments, and call your doctor if you are having problems. It's also a good idea to know your test results and keep a list of the medicines you take. What can you do to plan for the end of life? · You can bring these issues up with your doctor. You do not need to wait until your doctor starts the conversation. You might start with \"I would not be willing to live with . Annamarie Lewis \" When you complete this sentence it helps your doctor understand your wishes. · Talk openly and honestly with your doctor. This is the best way to understand the decisions you will need to make as your health changes. Know that you can always change your mind. · Ask your doctor about commonly used life-support measures. These include tube feedings, breathing machines, and fluids given through a vein (IV). Understanding these treatments will help you decide whether you want them. · You may choose to have these life-supporting treatments for a limited time. This allows a trial period to see whether they will help you. You may also decide that you want your doctor to take only certain measures to keep you alive. It is important to spell out these conditions so that your doctor and family understand them. · Talk to your doctor about how long you are likely to live.  He or she may be able to give you an idea of what usually happens with your specific illness. · Think about preparing papers that state your wishes. This way there will not be any confusion about what you want. You can change your instructions at any time. Which papers should you prepare? Advance directives are legal papers that tell doctors how you want to be cared for at the end of your life. You do not need a  to write these papers. Ask your doctor or your state health department for information on how to write your advance directives. They may have the forms for each of these types of papers. Make sure your doctor has a copy of these on file, and give a copy to a family member or close friend. · Consider a do-not-resuscitate order (DNR). This order asks that no extra treatments be done if your heart stops or you stop breathing. Extra treatments may include cardiopulmonary resuscitation (CPR), electrical shock to restart your heart, or a machine to breathe for you. If you decide to have a DNR order, ask your doctor to explain and write it. Place the order in your home where everyone can easily see it. · Consider a living will. A living will explains your wishes about life support and other treatments at the end of your life if you become unable to speak for yourself. Living gannon tell doctors to use or not use treatments that would keep you alive. You must have one or two witnesses or a notary present when you sign this form. · Consider a durable power of  for health care. This allows you to name a person to make decisions about your care if you are not able to. Most people ask a close friend or family member. Talk to this person about the kinds of treatments you want and those that you do not want. Make sure this person understands your wishes. These legal papers are simple to change. Tell your doctor what you want to change, and ask him or her to make a note in your medical file. Give your family updated copies of the papers.   Where can you learn more?  Go to http://erica-venice.info/. Enter P184 in the search box to learn more about \"Advance Care Planning: Care Instructions. \"  Current as of: November 17, 2016  Content Version: 11.3  © 5254-2315 [employer]. Care instructions adapted under license by [employer] (which disclaims liability or warranty for this information). If you have questions about a medical condition or this instruction, always ask your healthcare professional. [address] any warranty or liability for your use of this information. Advance Directives: Care Instructions  Your Care Instructions  An advance directive is a legal way to state your wishes at the end of your life. It tells your family and your doctor what to do if you can no longer say what you want. There are two main types of advance directives. You can change them any time that your wishes change. · A living will tells your family and your doctor your wishes about life support and other treatment. · A durable power of  for health care lets you name a person to make treatment decisions for you when you can't speak for yourself. This person is called a health care agent. If you do not have an advance directive, decisions about your medical care may be made by a doctor or a  who doesn't know you. It may help to think of an advance directive as a gift to the people who care for you. If you have one, they won't have to make tough decisions by themselves. Follow-up care is a key part of your treatment and safety. Be sure to make and go to all appointments, and call your doctor if you are having problems. It's also a good idea to know your test results and keep a list of the medicines you take. How can you care for yourself at home? · Discuss your wishes with your loved ones and your doctor. This way, there are no surprises. · Many states have a unique form.  Or you might use a universal form that has been approved by many states. This kind of form can sometimes be completed and stored online. Your electronic copy will then be available wherever you have a connection to the Internet. In most cases, doctors will respect your wishes even if you have a form from a different state. · You don't need a  to do an advance directive. But you may want to get legal advice. · Think about these questions when you prepare an advance directive:  ¨ Who do you want to make decisions about your medical care if you are not able to? Many people choose a family member or close friend. ¨ Do you know enough about life support methods that might be used? If not, talk to your doctor so you understand. ¨ What are you most afraid of that might happen? You might be afraid of having pain, losing your independence, or being kept alive by machines. ¨ Where would you prefer to die? Choices include your home, a hospital, or a nursing home. ¨ Would you like to have information about hospice care to support you and your family? ¨ Do you want to donate organs when you die? ¨ Do you want certain Jew practices performed before you die? If so, put your wishes in the advance directive. · Read your advance directive every year, and make changes as needed. When should you call for help? Be sure to contact your doctor if you have any questions. Where can you learn more? Go to http://erica-venice.info/. Enter R264 in the search box to learn more about \"Advance Directives: Care Instructions. \"  Current as of: November 17, 2016  Content Version: 11.3  © 3157-1532 [employer]. Care instructions adapted under license by [employer] (which disclaims liability or warranty for this information).  If you have questions about a medical condition or this instruction, always ask your healthcare professional. [address] any warranty or liability for your use of this information. Learning About Healthy Weight  What is a healthy weight? A healthy weight is the weight at which you feel good about yourself and have energy for work and play. It's also one that lowers your risk for health problems. What can you do to stay at a healthy weight? It can be hard to stay at a healthy weight, especially when fast food, vending-machine snacks, and processed foods are so easy to find. And with your busy lifestyle, activity may be low on your list of things to do. But staying at a healthy weight may be easier than you think. Here are some dos and don'ts for staying at a healthy weight:  Do eat healthy foods  The kinds of foods you eat have a big impact on both your weight and your health. Reaching and staying at a healthy weight is not about going on a diet. It's about making healthier food choices every day and changing your diet for good. Healthy eating means eating a variety of foods so that you get all the nutrients you need. Your body needs protein, carbohydrate, and fats for energy. They keep your heart beating, your brain active, and your muscles working. On most days, try to eat from each food group. This means eating a variety of:  · Whole grains, such as whole wheat breads and pastas. · Fruits and vegetables. · Dairy products, such as low-fat milk, yogurt, and cheese. · Lean proteins, such as all types of fish, chicken without the skin, and beans. Don't have too much or too little of one thing. All foods, if eaten in moderation, can be part of healthy eating. Even sweets can be okay. If your favorite foods are high in fat, salt, sugar, or calories, limit how often you eat them. Eat smaller servings, or look for healthy substitutes. Do watch what you eat  Many people eat more than their bodies need. Part of staying at a healthy weight means learning how much food you really need from day to day and not eating more than that.  Even with healthy foods, eating too much can make you gain weight. Having a well-balanced diet means that you eat enough, but not too much, and that your food gives you the nutrients you need to stay healthy. So listen to your body. Eat when you're hungry. Stop when you feel satisfied. It's a good idea to have healthy snacks ready for when you get hungry. Keep healthy snacks with you at work, in your car, and at home. If you have a healthy snack easily available, you'll be less likely to pick a candy bar or bag of chips from a vending machine instead. Some healthy snacks you might want to keep on hand are fruit, low-fat yogurt, string cheese, low-fat microwave popcorn, raisins and other dried fruit, nuts, whole wheat crackers, pretzels, carrots, celery sticks, and broccoli. Do some physical activity  A big part of reaching and staying at a healthy weight is being active. When you're active, you burn calories. This makes it easier to reach and stay at a healthy weight. When you're active on a regular basis, your body burns more calories, even when you're at rest. Being active helps you lose fat and build lean muscle. Try to be active for at least 1 hour every day. This may sound like a lot, but it's okay to be active in smaller blocks of time that add up to 1 hour a day. Any activity that makes your heart beat faster and keeps it there for a while counts. A brisk walk, run, or swim will get your heart beating faster. So will climbing stairs, shooting baskets, or cycling. Even some household chores like vacuuming and mowing the lawn will get your heart rate up. Pick activities that you enjoy--ones that make your heart beat faster, your muscles stronger, and your muscles and joints more flexible. If you find more than one thing you like doing, do them all. You don't have to do the same thing every day. Don't diet  Diets don't work. Diets are temporary.  Because you give up so much when you diet, you may be hungry and think about food all the time. And after you stop dieting, you also may overeat to make up for what you missed. Most people who diet end up gaining back the pounds they lost--and more. Remember that healthy bodies come in lots of shapes and sizes. Everyone can get healthier by eating better and being more active. Where can you learn more? Go to http://erica-venice.info/. Enter 004 [id number] in the search box to learn more about \"Learning About Healthy Weight. \"  Current as of: October 13, 2016  Content Version: 11.3  © 5773-0932 [employer]. Care instructions adapted under license by [employer] (which disclaims liability or warranty for this information). If you have questions about a medical condition or this instruction, always ask your healthcare professional. [address] any warranty or liability for your use of this information.

## 2017-10-09 ENCOUNTER — TELEPHONE (OUTPATIENT)
Dept: INTERNAL MEDICINE CLINIC | Age: 37
End: 2017-10-09

## 2017-10-09 PROBLEM — R76.8 POSITIVE ANA (ANTINUCLEAR ANTIBODY): Status: ACTIVE | Noted: 2017-05-01

## 2017-10-09 LAB
CCP IGA+IGG SERPL IA-ACNC: 5 UNITS (ref 0–19)
CRP SERPL-MCNC: <0.3 MG/L (ref 0–4.9)
ERYTHROCYTE [SEDIMENTATION RATE] IN BLOOD BY WESTERGREN METHOD: 2 MM/HR (ref 0–32)
HLA-B27 QL NAA+PROBE: NEGATIVE
RHEUMATOID FACT SERPL-ACNC: <10 IU/ML (ref 0–13.9)

## 2017-10-09 NOTE — LETTER
10/18/2017 2:56 PM 
 
Ms. Bridger Little [address] [id number] We have not been able to reach you by phone. We want to inform you that your recent labs were all negative for inflammation and rheumatoid arthritis as well as autoimmune spinal disease. I recommend you plan to be evaluated by rheumatology. Please call if you have any questions. Thank you. Sincerely, Lidia Olea MD

## 2017-10-09 NOTE — PROGRESS NOTES
HPI:   Brittanie Sun is a 40y.o. year old female who presents today for a physical exam and to establish care. She has a history of allergic rhinitis, attention deficit disorder, Raynaud's phenomenon, and chronic neck and back pain. She states that she has a long history dating back for at least 10 years where she has been experiencing chronic pain, particularly in her lower back with bilateral numbness and tingling in both legs, and in her neck. She was evaluated in [id number] by Dr. Zafar Buck, and underwent EMG/ NCS reportedly showing right C7-8 radiculopathy, right L5 radiculopathy and bilateral sensory polyneuropathy. She had a lumbar MRI at [facility] showing mild degenerative disc changes. She also had a brain MRI which was negative. She was reportedly found to have a low B12 level and was treated with a supplement. She has used naproxen intermittently over the years for discomfort. In 5/2017, she presented to Dr. Von Lisa for evaluation and underwent repeat EMG/ NCS, which were reportedly \"normal\". She also had ALIS which was strongly positive 1:1280 in speckled pattern. RF, ESR, TSH, and folate were normal. B12 was low normal, but MMA was in normal range. She reports that she continues to have the most significant pain in her low back, particularly in her buttocks. She also reports bilateral sciatica, with pain down both legs. She does also report pain in her ribs and neck, but most significant is the pain in her lower back. She denies pain in her hands or other peripheral joints, and denies rashes, oral ulcers, shortness of breath, or hematuria. She does report episodes where her hands and feet become cold, white, and painful, and she states that she carries hand and foot warmers with her year round. She has a history of palpitations, and presented to [facility] ED in 12/2016 for evaluation. EKG showed frequent PVCs, and she was advised to decrease her caffeine consumption.  She states that the palpitations seem to have improved over the last several months, and she is no longer experiencing episodes. She does not exercise regularly. She denies any chest pain, shortness of breath at rest or with exertion, lightheadedness, or edema. She does have a history of attention deficit disorder, diagnosed at the age of 16. She was initially treated with Ritalin, but discontinued taking it. She has been treated with Adderall for the last two years. She has a history of allergic rhinitis and seasonal allergies. She is being treated with Singulair and Nasacort. Past Medical History:   Diagnosis Date    ADD (attention deficit disorder)     Allergic rhinitis     Chronic low back pain     Palpitations     Positive ALIS (antinuclear antibody) 05/2017    Raynaud's phenomenon      History reviewed. No pertinent surgical history. Current Outpatient Prescriptions   Medication Sig    amphetamine-dextroamphetamine XR (ADDERALL XR) 25 mg XR capsule Take 1 Cap (25 mg total) by mouth every morning. Max Daily Amount: 25 mg    triamcinolone (NASACORT AQ) 55 mcg nasal inhaler USE 2 SPRAYS IN EACH NOSTRIL 2 TIMES A DAY    naproxen (NAPROSYN) 375 mg tablet TAKE ONE TABLET BY MOUTH TWICE DAILY WITH MEALS    montelukast (SINGULAIR) 10 mg tablet TAKE 1 TABLET BY MOUTH EVERY EVENING     No current facility-administered medications for this visit. Allergies and Intolerances: Allergies   Allergen Reactions    Cephalosporins Shortness of Breath and Rash     Family History: Her mother has sarcoid and was diagnosed with DCIS breast cancer at the age of 62. She is s/p bilateral mastectomy. Her paternal grandmother had breast and ovarian cancer. She has no family history of colon cancer. Family History   Problem Relation Age of Onset    Hypertension Mother      Social History:   She  reports that she has never smoked. She has never used smokeless tobacco. She is  with two children.  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 History   Alcohol Use    Yes     Comment: occasionally      Immunization History:  Immunization History   Administered Date(s) Administered    TD Vaccine 10/19/2005    Tdap 10/04/2017       Review of Systems:   As above included in HPI. Otherwise 11 point review of systems negative including constitutional, skin, HENT, eyes, respiratory, cardiovascular, gastrointestinal, genitourinary, musculoskeletal, endocrine, hematologic, allergy, and neurologic. Physical:   Vitals:   BP: 122/82  HR: 67  WT: 141 lb (64 kg)  BMI:  20.82    Exam:   Patient appears in no apparent distress. Affect is appropriate. HEENT --Anicteric sclerae, tympanic membranes normal,  ear canals normal.  PERRL, EOMI, conjunctiva and lids normal.   Sinuses were nontender, turbinates normal, hearing normal.  Oropharynx without  erythema, normal tongue, oral mucosa and tonsils. No cervical lymphadenopathy. No thyromegaly, JVD, or bruits. Carotid pulses 2+ with normal upstroke. Lungs --Clear to auscultation. No wheezing or rales. Heart --Regular rate and rhythm, no murmurs, rubs, gallops, or clicks. Breasts -- no masses, skin dimpling, asymmetry, nipple discharge, nodules, axillary lymphadenopathy. Chest wall --Nontender to palpation. PMI normal.  Abdomen -- Soft and nontender, no hepatosplenomegaly or masses. Extremities -- Without cyanosis, clubbing, edema. 2+ pulses equally and bilaterally. No evidence of active synovitis. Normal looking digits, ROM intact  Back-- non tender to palpation along spine of SI joints.   Derm - no obvious abnormalities noted, no rash    Review of Data:  Labs:  Hospital Outpatient Visit on 09/26/2017   Component Date Value Ref Range Status    WBC 09/26/2017 6.2  4.6 - 13.2 K/uL Final    RBC 09/26/2017 4.86  4.20 - 5.30 M/uL Final    HGB 09/26/2017 14.1  12.0 - 16.0 g/dL Final    HCT 09/26/2017 42.5  35.0 - 45.0 % Final    MCV 09/26/2017 87.4 74.0 - 97.0 FL Final    MCH 09/26/2017 29.0  24.0 - 34.0 PG Final    MCHC 09/26/2017 33.2  31.0 - 37.0 g/dL Final    RDW 09/26/2017 13.8  11.6 - 14.5 % Final    PLATELET 25/68/1604 732  135 - 420 K/uL Final    MPV 09/26/2017 11.3  9.2 - 11.8 FL Final    NEUTROPHILS 09/26/2017 70  40 - 73 % Final    LYMPHOCYTES 09/26/2017 22  21 - 52 % Final    MONOCYTES 09/26/2017 6  3 - 10 % Final    EOSINOPHILS 09/26/2017 2  0 - 5 % Final    BASOPHILS 09/26/2017 0  0 - 2 % Final    ABS. NEUTROPHILS 09/26/2017 4.3  1.8 - 8.0 K/UL Final    ABS. LYMPHOCYTES 09/26/2017 1.4  0.9 - 3.6 K/UL Final    ABS. MONOCYTES 09/26/2017 0.4  0.05 - 1.2 K/UL Final    ABS. EOSINOPHILS 09/26/2017 0.1  0.0 - 0.4 K/UL Final    ABS. BASOPHILS 09/26/2017 0.0  0.0 - 0.06 K/UL Final    DF 09/26/2017 AUTOMATED    Final    Sodium 09/26/2017 141  136 - 145 mmol/L Final    Potassium 09/26/2017 4.3  3.5 - 5.5 mmol/L Final    Chloride 09/26/2017 105  100 - 108 mmol/L Final    CO2 09/26/2017 28  21 - 32 mmol/L Final    Anion gap 09/26/2017 8  3.0 - 18 mmol/L Final    Glucose 09/26/2017 87  74 - 99 mg/dL Final    BUN 09/26/2017 12  7.0 - 18 MG/DL Final    Creatinine 09/26/2017 0.92  0.6 - 1.3 MG/DL Final    BUN/Creatinine ratio 09/26/2017 13  12 - 20   Final    GFR est AA 09/26/2017 >60  >60 ml/min/1.73m2 Final    GFR est non-AA 09/26/2017 >60  >60 ml/min/1.73m2 Final    Calcium 09/26/2017 8.7  8.5 - 10.1 MG/DL Final    Bilirubin, total 09/26/2017 0.5  0.2 - 1.0 MG/DL Final    ALT (SGPT) 09/26/2017 21  13 - 56 U/L Final    AST (SGOT) 09/26/2017 16  15 - 37 U/L Final    Alk.  phosphatase 09/26/2017 63  45 - 117 U/L Final    Protein, total 09/26/2017 7.6  6.4 - 8.2 g/dL Final    Albumin 09/26/2017 4.1  3.4 - 5.0 g/dL Final    Globulin 09/26/2017 3.5  2.0 - 4.0 g/dL Final    A-G Ratio 09/26/2017 1.2  0.8 - 1.7   Final    LIPID PROFILE 09/26/2017        Final    Cholesterol, total 09/26/2017 162  <200 MG/DL Final    Triglyceride 09/26/2017 56  <150 MG/DL Final    HDL Cholesterol 09/26/2017 69* 40 - 60 MG/DL Final    LDL, calculated 09/26/2017 81.8  0 - 100 MG/DL Final    VLDL, calculated 09/26/2017 11.2  MG/DL Final    CHOL/HDL Ratio 09/26/2017 2.3  0 - 5.0   Final    Color 09/26/2017 YELLOW    Final    Appearance 09/26/2017 CLEAR    Final    Specific gravity 09/26/2017 1.024  1.005 - 1.030   Final    pH (UA) 09/26/2017 5.5  5.0 - 8.0   Final    Protein 09/26/2017 NEGATIVE   NEG mg/dL Final    Glucose 09/26/2017 NEGATIVE   NEG mg/dL Final    Ketone 09/26/2017 NEGATIVE   NEG mg/dL Final    Bilirubin 09/26/2017 NEGATIVE   NEG   Final    Blood 09/26/2017 NEGATIVE   NEG   Final    Urobilinogen 09/26/2017 0.2  0.2 - 1.0 EU/dL Final    Nitrites 09/26/2017 NEGATIVE   NEG   Final    Leukocyte Esterase 09/26/2017 NEGATIVE   NEG   Final    WBC 09/26/2017 0 to 2  0 - 4 /hpf Final    RBC 09/26/2017 0  0 - 5 /hpf Final    Epithelial cells 09/26/2017 FEW  0 - 5 /lpf Final    Bacteria 09/26/2017 NEGATIVE   NEG /hpf Final    TSH 09/26/2017 1.63  0.36 - 3.74 uIU/mL Final    ALIS Direct 09/26/2017 Positive* NEGATIVE   Final    See below 09/26/2017 Comment    Final    Vitamin D 25-Hydroxy 09/26/2017 26.4* 30 - 100 ng/mL Final    Sjogren's-SSB 09/26/2017 <0.2  0.0 - 0.9 AI Final    Tello/RNP Abs 09/26/2017 <0.2  0.0 - 0.9 AI Final    Scleroderma Ab 09/26/2017 4.8* 0.0 - 0.9 AI Final    Antichromatin Abs 09/26/2017 0.3  0.0 - 0.9 AI Final    RNP Abs 09/26/2017 <0.2  0.0 - 0.9 AI Final    dsDNA Ab, Qt. 09/26/2017 <1  0 - 9 IU/mL Final    Sjogren's-SSA 09/26/2017 <0.2  0.0 - 0.9 AI Final    Smith Abs 09/26/2017 <0.2  0.0 - 0.9 AI Final     Calculated 10 year ASCVD risk score: 0.2 %    Health Maintenance:  Screening:    Mammogram: N/A   PAP smear: well women exams by gyn (last 2 years ago)   Colorectal: N/A   Depression: none   DM (HbA1c/FPG): FPG 87 (9/2017)   Hepatitis C: N/A   Falls: none   DEXA: N/A   Glaucoma: unknown   Smoking: none   Vitamin D: 26.4 (9/2017)   Medicare Wellness: N/A        Impression:  Patient Active Problem List   Diagnosis Code    Palpitations R00.2    Allergic rhinitis J30.9    ADD (attention deficit disorder) F98.8    Arthralgia M25.50    Raynaud's phenomenon I73.00    Positive ALIS (antinuclear antibody) R76.8    Chronic low back pain M54.5, G89.29       Plan:  1. Chronic low back pain. Patient with long standing symptoms involving her lower back with sciatica. MRI lumbar from 2009 without significant disease to explain symptoms. Describes pain particularly in sacroiliac joints, and also reports rib and neck pain. Given patient's age, insidious onset over many years, and description of symptoms, need to consider possibility of ankylosing spondylitis. Will check HLA-B27, ESR, and CRP to evaluate. Will also refer to rheumatology for evaluation. Defer decision regarding imaging to rheumatology evaluation. Consider plain films, and if negative, MRI of LS spine and SI joints. 2. Positive ALIS. Strongly positive at 1:1280 speckled pattern. Also, scleroderma Ab positive at 4.8. Will ask rheumatology to comment on significance as difficult to explain with current clinical presentation. 3. Raynaud's phenomenon. Symptoms appear consistent with Raynaud's although does not describe post warming hyperemia. Follow. 4. Palpitations. Observed to be frequent PVCs in the ED. May also be contributed to by Adderall use. Follow. 5. Attention deficit disorder. Continue Adderall. 6. Allergic rhinitis. Symptoms appear controlled on Singulair and Nasacort. Follow. 7. Health maintenance. Not willing to obtain influenza vaccine. Agreeable to Tdap. Will give today. Will refer to Dr. Patricia Bueno for well woman exam and pap smear. Vitmain D level slightly low. Instructed to take 1000 U daily supplement. Recommended to obtain regular eye exams. Patient understands recommendations and agrees with plan.   Follow-up in 12 months for physical.

## 2017-10-10 NOTE — TELEPHONE ENCOUNTER
Please let the patient know that the labs drawn at her visit were all negative. They included a sed rate and C-reactive protein (labs of inflammation), rheumatoid factor and CCP (for rheumatoid arthritis), and HLA-B27 (lab for autoimmune spinal disease). Recommend to continue with plan for evaluation by rheumatology. Thanks.

## 2017-11-01 RX ORDER — TRIAMCINOLONE ACETONIDE 55 UG/1
SPRAY, METERED NASAL
Qty: 1 BOTTLE | Refills: 2 | Status: SHIPPED | OUTPATIENT
Start: 2017-11-01 | End: 2021-09-18

## 2017-11-13 DIAGNOSIS — F98.8 ATTENTION DEFICIT DISORDER, UNSPECIFIED HYPERACTIVITY PRESENCE: Primary | ICD-10-CM

## 2017-11-13 NOTE — TELEPHONE ENCOUNTER
Last OV: 10/04/2017  Last Fill: 10/04/2017      Reviewed report generated by the [address] [place] [address]. Does not demonstrate aberrancies or inconsistencies with regard to the prescribing of controlled medications to this patient by other providers.       Per  last filled at pharmacy onb 09/12/2017

## 2017-11-13 NOTE — TELEPHONE ENCOUNTER
From: Katina Beltran  To: Hanna Finch MD  Sent: 11/13/2017 1:06 AM EST  Subject: Medication Renewal Request    Original authorizing provider: MD Ibrahima Klein. Steph Mauricio would like a refill of the following medications:  amphetamine-dextroamphetamine XR (ADDERALL XR) 25 mg XR capsule Hanna Finch MD]    Preferred pharmacy: [facility] [place]:  Written rx, I will pick it up when it's ready.  Thanks! :)

## 2017-11-14 DIAGNOSIS — F98.8 ATTENTION DEFICIT DISORDER, UNSPECIFIED HYPERACTIVITY PRESENCE: ICD-10-CM

## 2017-11-14 RX ORDER — DEXTROAMPHETAMINE SACCHARATE, AMPHETAMINE ASPARTATE MONOHYDRATE, DEXTROAMPHETAMINE SULFATE AND AMPHETAMINE SULFATE 6.25; 6.25; 6.25; 6.25 MG/1; MG/1; MG/1; MG/1
25 CAPSULE, EXTENDED RELEASE ORAL
Qty: 30 CAP | Refills: 0 | Status: SHIPPED | OUTPATIENT
Start: 2017-11-14 | End: 2017-12-11 | Stop reason: SDUPTHER

## 2017-11-18 LAB
AMPHETAMINES UR QL: POSITIVE
BARBITURATES UR QL SCN: NEGATIVE NG/ML
BENZODIAZ UR QL SCN: NEGATIVE NG/ML
BZE UR QL SCN: NEGATIVE NG/ML
CANNABINOIDS UR QL SCN: NEGATIVE NG/ML
CREAT UR-MCNC: 260.3 MG/DL (ref 20–300)
FENTANYL+NORFENTANYL UR QL SCN: NEGATIVE PG/ML
MEPERIDINE UR QL: NEGATIVE NG/ML
METHADONE UR QL SCN: NEGATIVE NG/ML
OPIATES UR QL SCN: NEGATIVE NG/ML
OXYCODONE+OXYMORPHONE UR QL SCN: NEGATIVE NG/ML
PCP UR QL: NEGATIVE NG/ML
PH UR: 5.4 [PH] (ref 4.5–8.9)
PLEASE NOTE:, 188601: NORMAL
PLEASE NOTE:, 733157: ABNORMAL
PROPOXYPH UR QL SCN: NEGATIVE NG/ML
SP GR UR: 1.02
TRAMADOL UR QL SCN: NEGATIVE NG/ML

## 2017-12-11 NOTE — TELEPHONE ENCOUNTER
From: Eusebio Jean  To: Any Islas MD  Sent: 12/11/2017 2:13 PM EST  Subject: Medication Renewal Request    Original authorizing provider: MD Carter Schilling. Read Shillings would like a refill of the following medications:  amphetamine-dextroamphetamine XR (ADDERALL XR) 25 mg XR capsule Any Islas MD]    Preferred pharmacy: [address]:  Written rx Please call and leave a message when ready. Thanks so much.

## 2017-12-12 PROBLEM — Z79.899 CONTROLLED SUBSTANCE AGREEMENT SIGNED: Status: ACTIVE | Noted: 2017-12-12

## 2017-12-12 RX ORDER — DEXTROAMPHETAMINE SACCHARATE, AMPHETAMINE ASPARTATE MONOHYDRATE, DEXTROAMPHETAMINE SULFATE AND AMPHETAMINE SULFATE 6.25; 6.25; 6.25; 6.25 MG/1; MG/1; MG/1; MG/1
25 CAPSULE, EXTENDED RELEASE ORAL
Qty: 30 CAP | Refills: 0 | Status: SHIPPED | OUTPATIENT
Start: 2017-12-12 | End: 2018-01-14 | Stop reason: SDUPTHER

## 2017-12-12 NOTE — TELEPHONE ENCOUNTER
Reviewed report generated by the [facility]. Does not demonstrate aberrancies or inconsistencies with regard to the prescribing of controlled medications to this patient by other providers. Last filled Adderall 11/15/2017 per .

## 2018-01-14 DIAGNOSIS — F98.8 ATTENTION DEFICIT DISORDER, UNSPECIFIED HYPERACTIVITY PRESENCE: Primary | ICD-10-CM

## 2018-01-15 NOTE — TELEPHONE ENCOUNTER
From: Kelin Trinidad  To: Dominick Cantu MD  Sent: 1/14/2018 1:21 PM EST  Subject: Medication Renewal Request    Original authorizing provider: MD Justine Leyva. Dinorah Dietrich would like a refill of the following medications:  amphetamine-dextroamphetamine XR (ADDERALL XR) 25 mg XR capsule Dominick Cantu MD]    Preferred pharmacy: [address] [place]:  Written rx please call and leave me a voicemail when it's ready. Thank you!

## 2018-01-16 RX ORDER — DEXTROAMPHETAMINE SACCHARATE, AMPHETAMINE ASPARTATE MONOHYDRATE, DEXTROAMPHETAMINE SULFATE AND AMPHETAMINE SULFATE 6.25; 6.25; 6.25; 6.25 MG/1; MG/1; MG/1; MG/1
25 CAPSULE, EXTENDED RELEASE ORAL
Qty: 30 CAP | Refills: 0 | Status: SHIPPED | OUTPATIENT
Start: 2018-01-18 | End: 2018-02-19 | Stop reason: SDUPTHER

## 2018-01-19 ENCOUNTER — TELEPHONE (OUTPATIENT)
Dept: INTERNAL MEDICINE CLINIC | Age: 38
End: 2018-01-19

## 2018-01-19 NOTE — LETTER
2018 9:03 AM 
 
Ms. Kleber Marks [address] [facility] [address] To Whom it may concern: 
 
 
Please review chart notes regarding Riley Mention ( 1980)  appeal request for Adderall XR 25mg daily. The patient has been stable oncurrent medication and dosage for over 2 years. She was diagnosed with ADHD at age 16 and was treated with Ritalin and switched to Adderall 2 years ago. The question \"Is there documentation of inattentive or hyperactive-impulsive symptoms before the age of 16? \" on the form that was submitted on 18 was incorrectly answered by error. If you have any further questions, please do not hesitate to call our office. Thank you for your time in reviewing this matter. Sincerely, Shelly Pérez MD

## 2018-01-19 NOTE — TELEPHONE ENCOUNTER
Prior auth for adderall was denied, [employer] is asking for documentation that the pt had symptoms of ADHD prior to age 16. I see in Dr Latrell Lozano notes that the pt was actually diagnosed at age 16 and treated with ritalin and then switched to [place] 2 years ago.  Letter dictated and OV notes noting her dx at age 16 sent to [employer] appeals department

## 2018-01-22 ENCOUNTER — DOCUMENTATION ONLY (OUTPATIENT)
Dept: INTERNAL MEDICINE CLINIC | Age: 38
End: 2018-01-22

## 2018-02-05 RX ORDER — NAPROXEN 375 MG/1
TABLET ORAL
Qty: 60 TAB | Refills: 1 | Status: SHIPPED | OUTPATIENT
Start: 2018-02-05 | End: 2019-05-16

## 2018-02-19 DIAGNOSIS — F98.8 ATTENTION DEFICIT DISORDER, UNSPECIFIED HYPERACTIVITY PRESENCE: ICD-10-CM

## 2018-02-19 NOTE — TELEPHONE ENCOUNTER
Last filled 1/18/18  Reviewed report generated by the [facility]. Does not demonstrate aberrancies or inconsistencies with regard to the prescribing of controlled medications to this patient by other providers.

## 2018-02-19 NOTE — TELEPHONE ENCOUNTER
From: Jhonatan Chin  To: Mimi Owens MD  Sent: 2/19/2018 5:15 AM EST  Subject: Medication Renewal Request    Original authorizing provider: Mimi Owens MD    [facility]. Beverly Watkins would like a refill of the following medications:  amphetamine-dextroamphetamine XR (ADDERALL XR) 25 mg XR capsule Mimi Owens MD]    Preferred pharmacy: [facility] [place], [address]:  Written rx please. Thanks so much.

## 2018-02-20 RX ORDER — DEXTROAMPHETAMINE SACCHARATE, AMPHETAMINE ASPARTATE MONOHYDRATE, DEXTROAMPHETAMINE SULFATE AND AMPHETAMINE SULFATE 6.25; 6.25; 6.25; 6.25 MG/1; MG/1; MG/1; MG/1
25 CAPSULE, EXTENDED RELEASE ORAL
Qty: 30 CAP | Refills: 0 | Status: SHIPPED | OUTPATIENT
Start: 2018-02-20 | End: 2018-03-23 | Stop reason: SDUPTHER

## 2018-03-23 DIAGNOSIS — F98.8 ATTENTION DEFICIT DISORDER, UNSPECIFIED HYPERACTIVITY PRESENCE: ICD-10-CM

## 2018-03-23 NOTE — TELEPHONE ENCOUNTER
From: Stephanie Louisville  To: Red Yoon MD  Sent: 3/23/2018 8:36 AM EDT  Subject: Medication Renewal Request    Original authorizing provider: MD Catrina Murillo. Essie Zafar would like a refill of the following medications:  amphetamine-dextroamphetamine XR (ADDERALL XR) 25 mg XR capsule Red Yoon MD]    [address]  Written RX, please call and leave message when ready. Thanks so much!

## 2018-03-27 RX ORDER — DEXTROAMPHETAMINE SACCHARATE, AMPHETAMINE ASPARTATE MONOHYDRATE, DEXTROAMPHETAMINE SULFATE AND AMPHETAMINE SULFATE 6.25; 6.25; 6.25; 6.25 MG/1; MG/1; MG/1; MG/1
25 CAPSULE, EXTENDED RELEASE ORAL
Qty: 30 CAP | Refills: 0 | Status: SHIPPED | OUTPATIENT
Start: 2018-03-27 | End: 2018-04-25 | Stop reason: SDUPTHER

## 2018-03-27 NOTE — TELEPHONE ENCOUNTER
Reviewed report generated by the [facility]. Does not demonstrate aberrancies or inconsistencies with regard to the prescribing of controlled medications to this patient by other providers. Last filled Adderall 2/23/2018 per .

## 2018-04-25 DIAGNOSIS — F98.8 ATTENTION DEFICIT DISORDER, UNSPECIFIED HYPERACTIVITY PRESENCE: ICD-10-CM

## 2018-04-25 RX ORDER — DEXTROAMPHETAMINE SACCHARATE, AMPHETAMINE ASPARTATE MONOHYDRATE, DEXTROAMPHETAMINE SULFATE AND AMPHETAMINE SULFATE 6.25; 6.25; 6.25; 6.25 MG/1; MG/1; MG/1; MG/1
25 CAPSULE, EXTENDED RELEASE ORAL
Qty: 30 CAP | Refills: 0 | Status: SHIPPED | OUTPATIENT
Start: 2018-04-25 | End: 2018-05-24 | Stop reason: SDUPTHER

## 2018-04-25 NOTE — TELEPHONE ENCOUNTER
Reviewed report generated by the [facility]. Does not demonstrate aberrancies or inconsistencies with regard to the prescribing of controlled medications to this patient by other providers. Last filled Adderall 3/28/2018 per .

## 2018-04-25 NOTE — TELEPHONE ENCOUNTER
From: Brooks Haynes  To: Hank Cantrell MD  Sent: 4/25/2018 10:30 AM EDT  Subject: Medication Renewal Request    Original authorizing provider: MD Aayush Bruce. Mony Mast would like a refill of the following medications:  amphetamine-dextroamphetamine XR (ADDERALL XR) 25 mg XR capsule Hank Cantrell MD]    Preferred pharmacy: [facility] [place], [address]:  Written rx. Please leave me a message when it's ready.  Thanks so much!!!

## 2018-05-24 DIAGNOSIS — F98.8 ATTENTION DEFICIT DISORDER, UNSPECIFIED HYPERACTIVITY PRESENCE: ICD-10-CM

## 2018-05-24 RX ORDER — DEXTROAMPHETAMINE SACCHARATE, AMPHETAMINE ASPARTATE MONOHYDRATE, DEXTROAMPHETAMINE SULFATE AND AMPHETAMINE SULFATE 6.25; 6.25; 6.25; 6.25 MG/1; MG/1; MG/1; MG/1
25 CAPSULE, EXTENDED RELEASE ORAL
Qty: 30 CAP | Refills: 0 | Status: SHIPPED | OUTPATIENT
Start: 2018-05-24 | End: 2018-06-24 | Stop reason: SDUPTHER

## 2018-05-24 NOTE — TELEPHONE ENCOUNTER
From: Dave Rodriguez  To: Darlene Carl MD  Sent: 5/24/2018 10:03 AM EDT  Subject: Medication Renewal Request    Original authorizing provider: Darlene Carl MD    Unknown Ananda. Anastasia Echavarria would like a refill of the following medications:  amphetamine-dextroamphetamine XR (ADDERALL XR) 25 mg XR capsule Darlene Carl MD]    Preferred pharmacy: [facility] [place], [address]:  Written rx. Please call and leave a message when it's ready to .  Thanks so much!!

## 2018-05-24 NOTE — TELEPHONE ENCOUNTER
[facility]all as 04/27/2018. There appears to be no inconsistencies in regards to the prescribing of this medication. Last Visit: 10/04/2017 with MD Danny Hills    Next Appointment: noted to f/u in 12 months for physical   Previous Refill Encounters: 04/25/2018 per MD Danny Hills #30    Requested Prescriptions     Pending Prescriptions Disp Refills    amphetamine-dextroamphetamine XR (ADDERALL XR) 25 mg XR capsule 30 Cap 0     Sig: Take 1 Cap (25 mg total) by mouth every morningEarliest Fill Date: 5/24/18.   Max Daily Amount: 25 mg

## 2018-06-24 DIAGNOSIS — F98.8 ATTENTION DEFICIT DISORDER, UNSPECIFIED HYPERACTIVITY PRESENCE: ICD-10-CM

## 2018-06-25 NOTE — TELEPHONE ENCOUNTER
From: Jamarcus Chopra  To: Rivka Castro MD  Sent: 6/24/2018 1:13 AM EDT  Subject: Medication Renewal Request    Original authorizing provider: MD Solomon Harrison would like a refill of the following medications:  amphetamine-dextroamphetamine XR (ADDERALL XR) 25 mg XR capsule Rivka Castro MD]    Preferred pharmacy: [facility] [place], [address]:  Written rx. Please call and leave a message when ready. Thanks so much!

## 2018-06-26 RX ORDER — DEXTROAMPHETAMINE SACCHARATE, AMPHETAMINE ASPARTATE MONOHYDRATE, DEXTROAMPHETAMINE SULFATE AND AMPHETAMINE SULFATE 6.25; 6.25; 6.25; 6.25 MG/1; MG/1; MG/1; MG/1
25 CAPSULE, EXTENDED RELEASE ORAL
Qty: 30 CAP | Refills: 0 | Status: SHIPPED | OUTPATIENT
Start: 2018-06-26 | End: 2018-07-27 | Stop reason: SDUPTHER

## 2018-06-26 NOTE — TELEPHONE ENCOUNTER
Reviewed report generated by the [facility]. Does not demonstrate aberrancies or inconsistencies with regard to the prescribing of controlled medications to this patient by other providers. Last filled Adderall 5/25/2018 per .

## 2018-07-27 DIAGNOSIS — F98.8 ATTENTION DEFICIT DISORDER, UNSPECIFIED HYPERACTIVITY PRESENCE: ICD-10-CM

## 2018-07-27 RX ORDER — DEXTROAMPHETAMINE SACCHARATE, AMPHETAMINE ASPARTATE MONOHYDRATE, DEXTROAMPHETAMINE SULFATE AND AMPHETAMINE SULFATE 6.25; 6.25; 6.25; 6.25 MG/1; MG/1; MG/1; MG/1
25 CAPSULE, EXTENDED RELEASE ORAL
Qty: 30 CAP | Refills: 0 | Status: SHIPPED | OUTPATIENT
Start: 2018-07-27 | End: 2018-08-30 | Stop reason: SDUPTHER

## 2018-07-27 NOTE — TELEPHONE ENCOUNTER
From: Heribertomichael Juanito  To: Todd Tyler MD  Sent: 7/27/2018 11:25 AM EDT  Subject: Medication Renewal Request    Original authorizing provider: MD Fredreick Rivas. Patsie Guardian would like a refill of the following medications:  amphetamine-dextroamphetamine XR (ADDERALL XR) 25 mg XR capsule Todd Tyler MD]    Preferred pharmacy: [facility] [place], [address]:  Written Rx, please call and leave a message when it's ready. Thanks!

## 2018-08-29 DIAGNOSIS — F98.8 ATTENTION DEFICIT DISORDER, UNSPECIFIED HYPERACTIVITY PRESENCE: ICD-10-CM

## 2018-08-29 RX ORDER — DEXTROAMPHETAMINE SACCHARATE, AMPHETAMINE ASPARTATE MONOHYDRATE, DEXTROAMPHETAMINE SULFATE AND AMPHETAMINE SULFATE 6.25; 6.25; 6.25; 6.25 MG/1; MG/1; MG/1; MG/1
25 CAPSULE, EXTENDED RELEASE ORAL
Qty: 30 CAP | Refills: 0 | OUTPATIENT
Start: 2018-08-29

## 2018-08-29 NOTE — TELEPHONE ENCOUNTER
[facility]derall as 07/31/2018. There appears to be no inconsistencies in regards to the prescribing of this medication. Last Visit: 10/04/2017 with MD Pa Chavez    Next Appointment: noted to f/u in one year   Previous Refill Encounters: 07/27/2018 per NP Blaze Mom #30     Requested Prescriptions     Pending Prescriptions Disp Refills    amphetamine-dextroamphetamine XR (ADDERALL XR) 25 mg XR capsule 30 Cap 0     Sig: Take 1 Cap (25 mg total) by mouth every morning.   Max Daily Amount: 25 mg

## 2018-08-30 RX ORDER — DEXTROAMPHETAMINE SACCHARATE, AMPHETAMINE ASPARTATE MONOHYDRATE, DEXTROAMPHETAMINE SULFATE AND AMPHETAMINE SULFATE 6.25; 6.25; 6.25; 6.25 MG/1; MG/1; MG/1; MG/1
25 CAPSULE, EXTENDED RELEASE ORAL
Qty: 30 CAP | Refills: 0 | Status: SHIPPED | OUTPATIENT
Start: 2018-08-30 | End: 2018-10-18 | Stop reason: SDUPTHER

## 2018-10-18 DIAGNOSIS — F98.8 ATTENTION DEFICIT DISORDER, UNSPECIFIED HYPERACTIVITY PRESENCE: ICD-10-CM

## 2018-10-19 NOTE — TELEPHONE ENCOUNTER
[facility] last [facility] date for Adderall as 09/22/2018. There appears to be no inconsistencies in regards to the prescribing of this medication. Last Visit: 10/04/2017 with MD Azeem Kirkland    Next Appointment: noted to f/u in one year   Previous Refill Encounters: 08/30/2018 per MD Moises Lawton #30    Requested Prescriptions     Pending Prescriptions Disp Refills    amphetamine-dextroamphetamine XR (ADDERALL XR) 25 mg XR capsule 30 Cap 0     Sig: Take 1 Cap (25 mg total) by mouth every morningEarliest Fill Date: 10/19/18.   Max Daily Amount: 25 mg

## 2018-10-22 RX ORDER — DEXTROAMPHETAMINE SACCHARATE, AMPHETAMINE ASPARTATE MONOHYDRATE, DEXTROAMPHETAMINE SULFATE AND AMPHETAMINE SULFATE 6.25; 6.25; 6.25; 6.25 MG/1; MG/1; MG/1; MG/1
25 CAPSULE, EXTENDED RELEASE ORAL
Qty: 30 CAP | Refills: 0 | Status: SHIPPED | OUTPATIENT
Start: 2018-10-22 | End: 2018-11-30 | Stop reason: SDUPTHER

## 2018-11-30 DIAGNOSIS — F98.8 ATTENTION DEFICIT DISORDER, UNSPECIFIED HYPERACTIVITY PRESENCE: ICD-10-CM

## 2018-11-30 RX ORDER — DEXTROAMPHETAMINE SACCHARATE, AMPHETAMINE ASPARTATE MONOHYDRATE, DEXTROAMPHETAMINE SULFATE AND AMPHETAMINE SULFATE 6.25; 6.25; 6.25; 6.25 MG/1; MG/1; MG/1; MG/1
25 CAPSULE, EXTENDED RELEASE ORAL
Qty: 30 CAP | Refills: 0 | Status: SHIPPED | OUTPATIENT
Start: 2018-11-30 | End: 2019-01-22 | Stop reason: SDUPTHER

## 2018-11-30 NOTE — TELEPHONE ENCOUNTER
LM letting the patient know that her requested rx is ready to be picked up, and she will need her photo ID to pick it up.

## 2019-01-22 DIAGNOSIS — F98.8 ATTENTION DEFICIT DISORDER, UNSPECIFIED HYPERACTIVITY PRESENCE: ICD-10-CM

## 2019-01-22 RX ORDER — DEXTROAMPHETAMINE SACCHARATE, AMPHETAMINE ASPARTATE MONOHYDRATE, DEXTROAMPHETAMINE SULFATE AND AMPHETAMINE SULFATE 6.25; 6.25; 6.25; 6.25 MG/1; MG/1; MG/1; MG/1
25 CAPSULE, EXTENDED RELEASE ORAL
Qty: 30 CAP | Refills: 0 | Status: SHIPPED | OUTPATIENT
Start: 2019-01-22 | End: 2019-02-19 | Stop reason: SDUPTHER

## 2019-01-22 NOTE — TELEPHONE ENCOUNTER
[facility] for 12[facility]22/18 as qty 30 for 30 days at [facility]. There appears to be no inconsistencies in regards to the prescribing of this medication. Last Visit: 10/4/17  Next Appt: None  Previous Refill Encounter: 11/30/18-30+0    Requested Prescriptions     Pending Prescriptions Disp Refills    amphetamine-dextroamphetamine XR (ADDERALL XR) 25 mg XR capsule 30 Cap 0     Sig: Take 1 Cap (25 mg total) by mouth every morningEarliest Fill Date: 1/22/19.   Max Daily Amount: 25 mg

## 2019-02-19 ENCOUNTER — OFFICE VISIT (OUTPATIENT)
Dept: INTERNAL MEDICINE CLINIC | Age: 39
End: 2019-02-19

## 2019-02-19 VITALS
DIASTOLIC BLOOD PRESSURE: 70 MMHG | HEART RATE: 96 BPM | OXYGEN SATURATION: 100 % | BODY MASS INDEX: 22.96 KG/M2 | TEMPERATURE: 99.3 F | SYSTOLIC BLOOD PRESSURE: 124 MMHG | HEIGHT: 69 IN | RESPIRATION RATE: 16 BRPM | WEIGHT: 155 LBS

## 2019-02-19 DIAGNOSIS — R00.2 PALPITATIONS: ICD-10-CM

## 2019-02-19 DIAGNOSIS — M79.601 RIGHT ARM PAIN: ICD-10-CM

## 2019-02-19 DIAGNOSIS — F98.8 ATTENTION DEFICIT DISORDER, UNSPECIFIED HYPERACTIVITY PRESENCE: ICD-10-CM

## 2019-02-19 DIAGNOSIS — G89.29 CHRONIC JOINT PAIN: Primary | ICD-10-CM

## 2019-02-19 DIAGNOSIS — G89.29 CHRONIC NECK PAIN: ICD-10-CM

## 2019-02-19 DIAGNOSIS — M54.2 CHRONIC NECK PAIN: ICD-10-CM

## 2019-02-19 DIAGNOSIS — M25.50 CHRONIC JOINT PAIN: Primary | ICD-10-CM

## 2019-02-19 DIAGNOSIS — R76.8 ELEVATED ANTINUCLEAR ANTIBODY (ANA) LEVEL: ICD-10-CM

## 2019-02-19 PROBLEM — M79.602 PARESTHESIA AND PAIN OF BOTH UPPER EXTREMITIES: Status: ACTIVE | Noted: 2017-05-24

## 2019-02-19 PROBLEM — R20.2 PARESTHESIA AND PAIN OF BOTH UPPER EXTREMITIES: Status: ACTIVE | Noted: 2017-05-24

## 2019-02-19 PROBLEM — Z86.39 HX OF NON ANEMIC VITAMIN B12 DEFICIENCY: Status: ACTIVE | Noted: 2017-05-24

## 2019-02-19 PROBLEM — R20.2 PARESTHESIA OF BOTH LOWER EXTREMITIES: Status: ACTIVE | Noted: 2017-05-24

## 2019-02-19 RX ORDER — PREDNISONE 10 MG/1
TABLET ORAL
Qty: 21 TAB | Refills: 0 | Status: SHIPPED | OUTPATIENT
Start: 2019-02-19 | End: 2019-02-27 | Stop reason: ALTCHOICE

## 2019-02-19 RX ORDER — DEXTROAMPHETAMINE SACCHARATE, AMPHETAMINE ASPARTATE MONOHYDRATE, DEXTROAMPHETAMINE SULFATE AND AMPHETAMINE SULFATE 6.25; 6.25; 6.25; 6.25 MG/1; MG/1; MG/1; MG/1
25 CAPSULE, EXTENDED RELEASE ORAL
Qty: 30 CAP | Refills: 0 | Status: SHIPPED | OUTPATIENT
Start: 2019-02-24 | End: 2019-03-21 | Stop reason: SDUPTHER

## 2019-02-19 NOTE — PROGRESS NOTES
ROOM # 11    Foster Leos presents today for   Chief Complaint   Patient presents with   Phyllis Meraph Arm Pain       Foster Deric preferred language for health care discussion is english/other. Depression Screening:  3 most recent [facility] Screens 2/19/2019 10/4/2017 5/10/2017 11/17/2015 5/23/2014   Little interest or pleasure in doing things Not at all Not at all Not at all Not at all Not at all   Feeling down, depressed, irritable, or hopeless Not at all Not at all Not at all Not at all Not at all   Total Score PHQ 2 0 0 0 0 0       Learning Assessment:  Learning Assessment 5/23/2014 11/19/2012   PRIMARY LEARNER Patient Patient   PRIMARY LANGUAGE ENGLISH ENGLISH   LEARNER PREFERENCE PRIMARY LISTENING DEMONSTRATION   ANSWERED BY patient patient   RELATIONSHIP SELF SELF       Abuse Screening:  Abuse Screening Questionnaire 5/23/2014   Do you ever feel afraid of your partner? N   Are you in a relationship with someone who physically or mentally threatens you? N   Is it safe for you to go home? Y       Fall Risk  No flowsheet data found. Visit Vitals  /70 (BP 1 Location: Left arm, BP Patient Position: Sitting)   Pulse 96   Temp 99.3 °F (37.4 °C) (Oral)   Resp 16   Ht 5' 9\" (1.753 m)   Wt 155 lb (70.3 kg)   SpO2 100%   BMI 22.89 kg/m²       Health Maintenance reviewed and discussed per provider. Yes    Foster Leos is due for   Health Maintenance Due   Topic Date Due    PAP AKA CERVICAL CYTOLOGY  05/17/2016    Influenza Age 5 to Adult  08/01/2018     Please order/place referral if appropriate. Advance Directive:  1. Do you have an advance directive in place? Patient Reply: No    2. If not, would you like material regarding how to put one in place? Patient Reply: No    Coordination of Care:  1. Have you been to the ER, urgent care clinic since your last visit? Hospitalized since your last visit?   No

## 2019-02-19 NOTE — Clinical Note
Dr. Sandrita Nazario I recommended this patient see you for CPE with pap and labs, I did not place orders for labs, I did recommend she get them before being seen and she will go to [facility] by Saturday

## 2019-02-19 NOTE — PATIENT INSTRUCTIONS
Tennis Elbow: Exercises  Your Care Instructions  Here are some examples of typical rehabilitation exercises for your condition. Start each exercise slowly. Ease off the exercise if you start to have pain. Your doctor or physical therapist will tell you when you can start these exercises and which ones will work best for you. How to do the exercises  Wrist flexor stretch    1. Extend your arm in front of you with your palm up. 2. Bend your wrist, pointing your hand toward the floor. 3. With your other hand, gently bend your wrist farther until you feel a mild to moderate stretch in your forearm. 4. Hold for at least 15 to 30 seconds. Repeat 2 to 4 times. Wrist extensor stretch    1. Repeat steps 1 to 4 of the stretch above but begin with your extended hand palm down. Ball or sock squeeze    1. Hold a tennis ball (or a rolled-up sock) in your hand. 2. Make a fist around the ball (or sock) and squeeze. 3. Hold for about 6 seconds, and then relax for up to 10 seconds. 4. Repeat 8 to 12 times. 5. Switch the ball (or sock) to your other hand and do 8 to 12 times. Wrist deviation    1. Sit so that your arm is supported but your hand hangs off the edge of a flat surface, such as a table. 2. Hold your hand out like you are shaking hands with someone. 3. Move your hand up and down. 4. Repeat this motion 8 to 12 times. 5. Switch arms. 6. Try to do this exercise twice with each hand. Wrist curls    1. Place your forearm on a table with your hand hanging over the edge of the table, palm up. 2. Place a 1- to 2-pound weight in your hand. This may be a dumbbell, a can of food, or a filled water bottle. 3. Slowly raise and lower the weight while keeping your forearm on the table and your palm facing up. 4. Repeat this motion 8 to 12 times. 5. Switch arms, and do steps 1 through 4.  6. Repeat with your hand facing down toward the floor. Switch arms. Biceps curls    1.  Sit leaning forward with your legs slightly spread and your left hand on your left thigh. 2. Place your right elbow on your right thigh, and hold the weight with your forearm horizontal.  3. Slowly curl the weight up and toward your chest.  4. Repeat this motion 8 to 12 times. 5. Switch arms, and do steps 1 through 4. Follow-up care is a key part of your treatment and safety. Be sure to make and go to all appointments, and call your doctor if you are having problems. It's also a good idea to know your test results and keep a list of the medicines you take. Where can you learn more? Go to http://erica-venice.info/. Enter E184 in the search box to learn more about \"Tennis Elbow: Exercises. \"  Current as of: September 20, 2018  Content Version: 11.9  © 9548-7781 [employer]. Care instructions adapted under license by [employer] (which disclaims liability or warranty for this information). If you have questions about a medical condition or this instruction, always ask your healthcare professional. [address] any warranty or liability for your use of this information.

## 2019-02-19 NOTE — PROGRESS NOTES
Tate Moon is a 41 y.o.  female and presents with    Chief Complaint   Patient presents with    Arm Pain       Subjective:  HPI  Mrs. Claudia Ernst started about 1 week ago with right upper arm pain. She reports tightness to neck and over shoulder blade. She reports has chronic neck and back problems. She can localize the pain. She reports paresthesia from the localized sight to the tips of fingers but reports unsure if related to chronic issues. She reports the shoulder feels weak, with dull pain. She denies swelling and heat. 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 She reports does not lift her grandmother. She reports history of elevated ALIS, was supposed to be seen by rheumatology in the past but has not. EMGs were completed in the past.     She was using Advil and did try Naprosyn with minimal relief. Additional Concerns: none     ROS   Review of Systems   Constitutional: Negative. Respiratory: Negative. Cardiovascular: Negative. Musculoskeletal: Positive for back pain, joint pain and neck pain. Skin: Negative. Neurological: Positive for tingling. Allergies   Allergen Reactions    Cephalosporins Shortness of Breath and Rash       Current Outpatient Medications   Medication Sig Dispense Refill    predniSONE (STERAPRED DS) 10 mg dose pack See administration instruction per 10mg dose pack 21 Tab 0    amphetamine-dextroamphetamine XR (ADDERALL XR) 25 mg XR capsule Take 1 Cap (25 mg total) by mouth every morningEarliest Fill Date: 1/22/19.   Max Daily Amount: 25 mg 30 Cap 0    montelukast (SINGULAIR) 10 mg tablet TAKE ONE TABLET BY MOUTH EVERY EVENING 30 Tab 5    triamcinolone (NASACORT AQ) 55 mcg nasal inhaler USE 2 SPRAYS IN EACH NOSTRIL 2 TIMES A DAY 1 Bottle 2    naproxen (NAPROSYN) 375 mg tablet TAKE ONE TABLET BY MOUTH TWICE DAILY WITH MEALS 60 Tab 1       Social History     Socioeconomic History    Marital status:  Spouse name: Not on file    Number of children: Not on file    Years of education: Not on file    Highest education level: Not on file   Social Needs    Financial resource strain: Not on file    Food insecurity - worry: Not on file    Food insecurity - inability: Not on file    Transportation needs - medical: Not on file   [employer] needs - non-medical: Not on file   Occupational History    Not on file   Tobacco Use    Smoking status: Never Smoker    Smokeless tobacco: Never Used   Substance and Sexual Activity    Alcohol use: Yes     Comment: occasionally     Drug use: No    Sexual activity: Yes   Other Topics Concern    Not on file   Social History Narrative    Not on file       Past Medical History:   Diagnosis Date    ADD (attention deficit disorder)     Allergic rhinitis     Chronic low back pain     Palpitations     Positive ALIS (antinuclear antibody) 05/2017    Raynaud's phenomenon        History reviewed. No pertinent surgical history. Family History   Problem Relation Age of Onset    Hypertension Mother        Objective:  Vitals:    02/19/19 1207   BP: 124/70   Pulse: 96   Resp: 16   Temp: 99.3 °F (37.4 °C)   TempSrc: Oral   SpO2: 100%   Weight: 155 lb (70.3 kg)   Height: 5' 9\" (1.753 m)   PainSc:   3   PainLoc: Arm       LABS   Results for orders placed or performed in visit on 11/14/17   PAIN MGMT PANEL W/REFL, UR   Result Value Ref Range    Amphetamine Screen, urine Positive (A) Jjybmk=[id number]    Barbiturates Screen, urine Negative Wwjqpx=639 ng/mL    Benzodiazepines Screen, urine Negative Nwmcev=714 ng/mL    Cannabinoid Screen, urine Negative Cutoff=20 ng/mL    Cocaine Metab.  Screen, urine Negative Luvvxs=799 ng/mL    Opiate Screen, urine Negative Exjmll=053 ng/mL    Oxycodone/Oxymorphone, urine Negative Dsataf=936 ng/mL    Phencyclidine Screen, urine Negative Cutoff=25 ng/mL    Methadone Screen, urine Negative Txbuxa=399 ng/mL    Propoxyphene Screen, urine Negative Ldjoov=300 ng/mL    Meperidine screen Negative Yshgji=855 ng/mL    FENTANYL, URINE Negative Ltpdzl=8982 pg/mL    Tramadol Screen, urine Negative Wrxlah=794 ng/mL    Creatinine, urine 260.3 20.0 - 300.0 mg/dL    Specific Gravity 1.017     pH, urine 5.4 4.5 - 8.9    Please Note Comment    PLEASE NOTE   Result Value Ref Range    Please note Comment        TESTS  none    PE  Physical Exam   Constitutional: She is oriented to person, place, and time. She appears well-developed and well-nourished. No distress. Musculoskeletal: Normal range of motion. She exhibits tenderness. She exhibits no edema or deformity. Arms:  Neurological: She is alert and oriented to person, place, and time. She has normal reflexes. No cranial nerve deficit. Coordination normal.   Negative apley, FROM, she denies syncope/near syncope   Skin: Skin is warm and dry. She is not diaphoretic. Psychiatric: She has a normal mood and affect. Her behavior is normal. Judgment and thought content normal.   Vitals reviewed. Assessment/Plan:    1. Acute on chronic right arm pain and paresthesia- Prednisone dose pack and advised to stop all NSAIDs, place heat to sight, do arm exercises as handout provided, then ice after, referral to PT. I did refer her to Rheumatology as she has a history of elevated ALIS x 2 and reported was supposed to be seen by Rheumatology in the past however did not go do to scheduling issues/insurance changes. She is without swelling, edema, redness to joints. I did recommend to be seen by PCP for a complete physical and pap smear. She is scheduled for 2/26/19 and I recommended labs be completed one week prior at [facility]- I will send the note to Dr. Benny Nuñez for her to determine what labs she would like. Lab review: no lab studies available for review at time of visit    Today's Visit:   Diagnoses and all orders for this visit:    1.  Chronic joint pain  -     REFERRAL TO RHEUMATOLOGY    2. Elevated antinuclear antibody (ALIS) level  -     REFERRAL TO RHEUMATOLOGY    3. Right arm pain  -     predniSONE (STERAPRED DS) 10 mg dose pack; See administration instruction per 10mg dose pack  -     REFERRAL TO PHYSICAL THERAPY    4. Chronic neck pain  -     predniSONE (STERAPRED DS) 10 mg dose pack; See administration instruction per 10mg dose pack  -     REFERRAL TO PHYSICAL THERAPY      Health Maintenance: Deferred to PCP. I have discussed the diagnosis with the patient and the intended plan as seen in the above orders. The patient has received an after-visit summary and questions were answered concerning future plans. I have discussed medication side effects and warnings with the patient as well. I have reviewed the plan of care with the patient, accepted their input and they are in agreement with the treatment goals. Follow-up Disposition: Not on File   More than 1/2 of this 15 minute visit was spent in counseling and coordination of care, as described above.     SYDNEY Craven  Internist of [address], [address] [place] [address].  Phone: [phone]  Fax: [phone]

## 2019-02-19 NOTE — TELEPHONE ENCOUNTER
[facility] as qty 30 for 30 day supply at [facility]. There appears to be no inconsistencies in regards to the prescribing of this medication. Last Visit: 10/4/17  Next Appt: 2/26/19  Previous Refill Encounter: 1/22-30+0    Requested Prescriptions     Pending Prescriptions Disp Refills    amphetamine-dextroamphetamine XR (ADDERALL XR) 25 mg XR capsule 30 Cap 0     Sig: Take 1 Cap (25 mg total) by mouth every morningEarliest Fill Date: 2/19/19.   Max Daily Amount: 25 mg

## 2019-02-20 DIAGNOSIS — G89.29 CHRONIC NECK PAIN: ICD-10-CM

## 2019-02-20 DIAGNOSIS — G89.29 CHRONIC JOINT PAIN: ICD-10-CM

## 2019-02-20 DIAGNOSIS — M79.601 RIGHT ARM PAIN: ICD-10-CM

## 2019-02-20 DIAGNOSIS — R76.8 ELEVATED ANTINUCLEAR ANTIBODY (ANA) LEVEL: ICD-10-CM

## 2019-02-20 DIAGNOSIS — M25.50 CHRONIC JOINT PAIN: ICD-10-CM

## 2019-02-20 DIAGNOSIS — R00.2 PALPITATIONS: ICD-10-CM

## 2019-02-20 DIAGNOSIS — M54.2 CHRONIC NECK PAIN: ICD-10-CM

## 2019-02-23 ENCOUNTER — HOSPITAL ENCOUNTER (OUTPATIENT)
Dept: LAB | Age: 39
Discharge: HOME | End: 2019-02-23

## 2019-02-23 LAB — XX-LABCORP SPECIMEN COL,LCBCF: NORMAL

## 2019-02-23 PROCEDURE — 99001 SPECIMEN HANDLING PT-LAB: CPT

## 2019-02-25 LAB
25(OH)D3+25(OH)D2 SERPL-MCNC: 4.8 NG/ML (ref 30–100)
ALBUMIN SERPL-MCNC: 4.2 G/DL (ref 3.5–5.5)
ALBUMIN/GLOB SERPL: 1.3 {RATIO} (ref 1.2–2.2)
ALP SERPL-CCNC: 71 IU/L (ref 39–117)
ALT SERPL-CCNC: 11 IU/L (ref 0–32)
APPEARANCE UR: CLEAR
AST SERPL-CCNC: 11 IU/L (ref 0–40)
BACTERIA #/AREA URNS HPF: ABNORMAL /[HPF]
BASOPHILS # BLD AUTO: 0 X10E3/UL (ref 0–0.2)
BASOPHILS NFR BLD AUTO: 0 %
BILIRUB SERPL-MCNC: 0.4 MG/DL (ref 0–1.2)
BILIRUB UR QL STRIP: NEGATIVE
BUN SERPL-MCNC: 16 MG/DL (ref 6–20)
BUN/CREAT SERPL: 20 (ref 9–23)
CALCIUM SERPL-MCNC: 9.3 MG/DL (ref 8.7–10.2)
CASTS URNS QL MICRO: ABNORMAL /LPF
CHLORIDE SERPL-SCNC: 102 MMOL/L (ref 96–106)
CHOLEST SERPL-MCNC: 174 MG/DL (ref 100–199)
CO2 SERPL-SCNC: 22 MMOL/L (ref 20–29)
COLOR UR: YELLOW
CREAT SERPL-MCNC: 0.8 MG/DL (ref 0.57–1)
EOSINOPHIL # BLD AUTO: 0 X10E3/UL (ref 0–0.4)
EOSINOPHIL NFR BLD AUTO: 0 %
EPI CELLS #/AREA URNS HPF: >10 /HPF
ERYTHROCYTE [DISTWIDTH] IN BLOOD BY AUTOMATED COUNT: 15 % (ref 12.3–15.4)
GLOBULIN SER CALC-MCNC: 3.2 G/DL (ref 1.5–4.5)
GLUCOSE SERPL-MCNC: 99 MG/DL (ref 65–99)
GLUCOSE UR QL: NEGATIVE
HCT VFR BLD AUTO: 38 % (ref 34–46.6)
HDLC SERPL-MCNC: 51 MG/DL
HGB BLD-MCNC: 12.3 G/DL (ref 11.1–15.9)
HGB UR QL STRIP: NEGATIVE
IMM GRANULOCYTES # BLD AUTO: 0 X10E3/UL (ref 0–0.1)
IMM GRANULOCYTES NFR BLD AUTO: 0 %
INTERPRETATION, 910389: NORMAL
KETONES UR QL STRIP: NEGATIVE
LDLC SERPL CALC-MCNC: 103 MG/DL (ref 0–99)
LEUKOCYTE ESTERASE UR QL STRIP: NEGATIVE
LYMPHOCYTES # BLD AUTO: 1.7 X10E3/UL (ref 0.7–3.1)
LYMPHOCYTES NFR BLD AUTO: 20 %
MCH RBC QN AUTO: 25.8 PG (ref 26.6–33)
MCHC RBC AUTO-ENTMCNC: 32.4 G/DL (ref 31.5–35.7)
MCV RBC AUTO: 80 FL (ref 79–97)
MICRO URNS: NORMAL
MICRO URNS: NORMAL
MONOCYTES # BLD AUTO: 0.6 X10E3/UL (ref 0.1–0.9)
MONOCYTES NFR BLD AUTO: 7 %
MUCOUS THREADS URNS QL MICRO: PRESENT
NEUTROPHILS # BLD AUTO: 6.3 X10E3/UL (ref 1.4–7)
NEUTROPHILS NFR BLD AUTO: 73 %
NITRITE UR QL STRIP: NEGATIVE
PH UR STRIP: 6.5 [PH] (ref 5–7.5)
PLATELET # BLD AUTO: 296 X10E3/UL (ref 150–379)
POTASSIUM SERPL-SCNC: 3.9 MMOL/L (ref 3.5–5.2)
PROT SERPL-MCNC: 7.4 G/DL (ref 6–8.5)
PROT UR QL STRIP: NEGATIVE
RBC # BLD AUTO: 4.77 X10E6/UL (ref 3.77–5.28)
RBC #/AREA URNS HPF: ABNORMAL /HPF
SODIUM SERPL-SCNC: 140 MMOL/L (ref 134–144)
SP GR UR: 1.02 (ref 1–1.03)
TRIGL SERPL-MCNC: 100 MG/DL (ref 0–149)
TSH SERPL DL<=0.005 MIU/L-ACNC: 0.67 UIU/ML (ref 0.45–4.5)
UROBILINOGEN UR STRIP-MCNC: 0.2 MG/DL (ref 0.2–1)
VLDLC SERPL CALC-MCNC: 20 MG/DL (ref 5–40)
WBC # BLD AUTO: 8.6 X10E3/UL (ref 3.4–10.8)
WBC #/AREA URNS HPF: ABNORMAL /HPF

## 2019-02-26 ENCOUNTER — OFFICE VISIT (OUTPATIENT)
Dept: INTERNAL MEDICINE CLINIC | Age: 39
End: 2019-02-26

## 2019-02-26 VITALS
SYSTOLIC BLOOD PRESSURE: 138 MMHG | BODY MASS INDEX: 23.89 KG/M2 | TEMPERATURE: 100.1 F | RESPIRATION RATE: 14 BRPM | OXYGEN SATURATION: 99 % | HEART RATE: 100 BPM | WEIGHT: 152.2 LBS | HEIGHT: 67 IN | DIASTOLIC BLOOD PRESSURE: 86 MMHG

## 2019-02-26 DIAGNOSIS — G89.29 CHRONIC LOW BACK PAIN WITH BILATERAL SCIATICA, UNSPECIFIED BACK PAIN LATERALITY: ICD-10-CM

## 2019-02-26 DIAGNOSIS — E55.9 VITAMIN D DEFICIENCY: ICD-10-CM

## 2019-02-26 DIAGNOSIS — Z01.419 ENCOUNTER FOR WELL WOMAN EXAM WITH ROUTINE GYNECOLOGICAL EXAM: ICD-10-CM

## 2019-02-26 DIAGNOSIS — Z12.31 SCREENING MAMMOGRAM, ENCOUNTER FOR: ICD-10-CM

## 2019-02-26 DIAGNOSIS — M54.42 CHRONIC LOW BACK PAIN WITH BILATERAL SCIATICA, UNSPECIFIED BACK PAIN LATERALITY: ICD-10-CM

## 2019-02-26 DIAGNOSIS — E78.5 DYSLIPIDEMIA: ICD-10-CM

## 2019-02-26 DIAGNOSIS — J30.2 SEASONAL ALLERGIC RHINITIS, UNSPECIFIED TRIGGER: ICD-10-CM

## 2019-02-26 DIAGNOSIS — M54.41 CHRONIC LOW BACK PAIN WITH BILATERAL SCIATICA, UNSPECIFIED BACK PAIN LATERALITY: ICD-10-CM

## 2019-02-26 DIAGNOSIS — Z00.01 ENCOUNTER FOR ROUTINE ADULT PHYSICAL EXAM WITH ABNORMAL FINDINGS: Primary | ICD-10-CM

## 2019-02-26 DIAGNOSIS — R76.8 POSITIVE ANA (ANTINUCLEAR ANTIBODY): ICD-10-CM

## 2019-02-26 DIAGNOSIS — F98.8 ATTENTION DEFICIT DISORDER, UNSPECIFIED HYPERACTIVITY PRESENCE: ICD-10-CM

## 2019-02-26 DIAGNOSIS — M25.50 ARTHRALGIA, UNSPECIFIED JOINT: ICD-10-CM

## 2019-02-26 DIAGNOSIS — I73.00 RAYNAUD'S PHENOMENON WITHOUT GANGRENE: ICD-10-CM

## 2019-02-26 RX ORDER — ERGOCALCIFEROL 1.25 MG/1
50000 CAPSULE ORAL
Qty: 12 CAP | Refills: 1 | Status: SHIPPED | OUTPATIENT
Start: 2019-02-26 | End: 2020-03-19 | Stop reason: SDUPTHER

## 2019-02-26 RX ORDER — CETIRIZINE HCL 10 MG
TABLET ORAL
COMMUNITY

## 2019-02-26 NOTE — PATIENT INSTRUCTIONS
Well Visit, Ages 25 to 48: Care Instructions  Your Care Instructions    Physical exams can help you stay healthy. Your doctor has checked your overall health and may have suggested ways to take good care of yourself. He or she also may have recommended tests. At home, you can help prevent illness with healthy eating, regular exercise, and other steps. Follow-up care is a key part of your treatment and safety. Be sure to make and go to all appointments, and call your doctor if you are having problems. It's also a good idea to know your test results and keep a list of the medicines you take. How can you care for yourself at home? · Reach and stay at a healthy weight. This will lower your risk for many problems, such as obesity, diabetes, heart disease, and high blood pressure. · Get at least 30 minutes of physical activity on most days of the week. Walking is a good choice. You also may want to do other activities, such as running, swimming, cycling, or playing tennis or team sports. Discuss any changes in your exercise program with your doctor. · Do not smoke or allow others to smoke around you. If you need help quitting, talk to your doctor about stop-smoking programs and medicines. These can increase your chances of quitting for good. · Talk to your doctor about whether you have any risk factors for sexually transmitted infections (STIs). Having one sex partner (who does not have STIs and does not have sex with anyone else) is a good way to avoid these infections. · Use birth control if you do not want to have children at this time. Talk with your doctor about the choices available and what might be best for you. · Protect your skin from too much sun. When you're outdoors from 10 a.m. to 4 p.m., stay in the shade or cover up with clothing and a hat with a wide brim. Wear sunglasses that block UV rays. Even when it's cloudy, put broad-spectrum sunscreen (SPF 30 or higher) on any exposed skin.   · See a dentist one or two times a year for checkups and to have your teeth cleaned. · Wear a seat belt in the car. · Drink alcohol in moderation, if at all. That means no more than 2 drinks a day for men and 1 drink a day for women. Follow your doctor's advice about when to have certain tests. These tests can spot problems early. For everyone  · Cholesterol. Have the fat (cholesterol) in your blood tested after age 21. Your doctor will tell you how often to have this done based on your age, family history, or other things that can increase your risk for heart disease. · Blood pressure. Have your blood pressure checked during a routine doctor visit. Your doctor will tell you how often to check your blood pressure based on your age, your blood pressure results, and other factors. · Vision. Talk with your doctor about how often to have a glaucoma test.  · Diabetes. Ask your doctor whether you should have tests for diabetes. · Colon cancer. Have a test for colon cancer at age 48. You may have one of several tests. If you are younger than 48, you may need a test earlier if you have any risk factors. Risk factors include whether you already had a precancerous polyp removed from your colon or whether your parent, brother, sister, or child has had colon cancer. For women  · Breast exam and mammogram. Talk to your doctor about when you should have a clinical breast exam and a mammogram. Medical experts differ on whether and how often women under 50 should have these tests. Your doctor can help you decide what is right for you. · Pap test and pelvic exam. Begin Pap tests at age 24. A Pap test is the best way to find cervical cancer. The test often is part of a pelvic exam. Ask how often to have this test.  · Tests for sexually transmitted infections (STIs). Ask whether you should have tests for STIs. You may be at risk if you have sex with more than one person, especially if your partners do not wear condoms.   For men  · Tests for sexually transmitted infections (STIs). Ask whether you should have tests for STIs. You may be at risk if you have sex with more than one person, especially if you do not wear a condom. · Testicular cancer exam. Ask your doctor whether you should check your testicles regularly. · Prostate exam. Talk to your doctor about whether you should have a blood test (called a PSA test) for prostate cancer. Experts differ on whether and when men should have this test. Some experts suggest it if you are older than 39 and are -American or have a father or brother who got prostate cancer when he was younger than 72. When should you call for help? Watch closely for changes in your health, and be sure to contact your doctor if you have any problems or symptoms that concern you. Where can you learn more? Go to http://erica-venice.info/. Enter P072 in the search box to learn more about \"Well Visit, Ages 25 to 48: Care Instructions. \"  Current as of: March 28, 2018  Content Version: 11.9  © 8523-7979 [employer]. Care instructions adapted under license by [employer] (which disclaims liability or warranty for this information). If you have questions about a medical condition or this instruction, always ask your healthcare professional. [address] any warranty or liability for your use of this information. Learning About Vitamin D  Why is it important to get enough vitamin D? Your body needs vitamin D to absorb calcium. Calcium keeps your bones and muscles, including your heart, healthy and strong. If your muscles don't get enough calcium, they can cramp, hurt, or feel weak. You may have long-term (chronic) muscle aches and pains. If you don't get enough vitamin D throughout life, you have an increased chance of having thin and brittle bones (osteoporosis) in your later years.  Children who don't get enough vitamin D may not grow as much as others their age. They also have a chance of getting a rare disease called rickets. It causes weak bones. Vitamin D and calcium are added to many foods. And your body uses sunshine to make its own vitamin D. How much vitamin D do you need? The [place] of Medicine recommends that people ages 3 through 79 get 600 IU (international units) every day. Adults 71 and older need 800 IU every day. Blood tests for vitamin D can check your vitamin D level. But there is no standard normal range used by all laboratories. You're likely getting enough vitamin D if your levels are in the range of 20 to 50 ng/mL. How can you get more vitamin D? Foods that contain vitamin D include:  · [place], tuna, and mackerel. These are some of the best foods to eat when you need to get more vitamin D.  · Cheese, egg yolks, and beef liver. These foods have vitamin D in small amounts. · Milk, soy drinks, orange juice, yogurt, margarine, and some kinds of cereal have vitamin D added to them. Some people don't make vitamin D as well as others. They may have to take extra care in getting enough vitamin D. Things that reduce how much vitamin D your body makes include:  · Dark skin, such as many  Americans have. · Age, especially if you are older than 72. · Digestive problems, such as Crohn's or celiac disease. · Liver and kidney disease. Some people who do not get enough vitamin D may need supplements. Are there any risks from taking vitamin D?  · Too much vitamin D:  ? Can damage your kidneys. ? Can cause nausea and vomiting, constipation, and weakness. ? Raises the amount of calcium in your blood. If this happens, you can get confused or have an irregular heart rhythm. · Vitamin D may interact with other medicines. Tell your doctor about all of the medicines you take, including over-the-counter drugs, herbs, and pills. Tell your doctor about all of your current medical problems. Where can you learn more?   Go to http://hui.info/. Enter [id number] in the search box to learn more about \"Learning About Vitamin D.\"  Current as of: March 28, 2018  Content Version: 11.9  © 2987-7447 [employer]. Care instructions adapted under license by [employer] (which disclaims liability or warranty for this information). If you have questions about a medical condition or this instruction, always ask your healthcare professional. [address] any warranty or liability for your use of this information.

## 2019-02-26 NOTE — PROGRESS NOTES
Chief Complaint   Patient presents with    Complete Physical     Yearly physical with lab results.  Well Woman     PAP exam.     Health Maintenance Due   Topic Date Due    PAP AKA CERVICAL CYTOLOGY  05/17/2016    Influenza Age 5 to Adult  08/01/2018     1. Have you been to the ER, urgent care clinic or hospitalized since your last visit? NO.     2. Have you seen or consulted any other health care providers outside of the [address] [place] [address] since your last visit (Include any pap smears or colon screening)?  NO

## 2019-02-27 ENCOUNTER — TELEPHONE (OUTPATIENT)
Dept: INTERNAL MEDICINE CLINIC | Age: 39
End: 2019-02-27

## 2019-02-27 ENCOUNTER — HOSPITAL ENCOUNTER (EMERGENCY)
Age: 39
Discharge: HOME | End: 2019-02-27
Attending: EMERGENCY MEDICINE
Payer: COMMERCIAL

## 2019-02-27 VITALS
HEIGHT: 69 IN | RESPIRATION RATE: 18 BRPM | BODY MASS INDEX: 22.96 KG/M2 | SYSTOLIC BLOOD PRESSURE: 122 MMHG | OXYGEN SATURATION: 99 % | TEMPERATURE: 99.2 F | HEART RATE: 93 BPM | WEIGHT: 155 LBS | DIASTOLIC BLOOD PRESSURE: 72 MMHG

## 2019-02-27 DIAGNOSIS — R11.2 NON-INTRACTABLE VOMITING WITH NAUSEA, UNSPECIFIED VOMITING TYPE: ICD-10-CM

## 2019-02-27 DIAGNOSIS — R68.89 FLU-LIKE SYMPTOMS: Primary | ICD-10-CM

## 2019-02-27 LAB
ALBUMIN SERPL-MCNC: 3.7 G/DL (ref 3.4–5)
ALBUMIN/GLOB SERPL: 1 {RATIO} (ref 0.8–1.7)
ALP SERPL-CCNC: 78 U/L (ref 45–117)
ALT SERPL-CCNC: 20 U/L (ref 13–56)
ANION GAP SERPL CALC-SCNC: 8 MMOL/L (ref 3–18)
APPEARANCE UR: CLEAR
AST SERPL-CCNC: 14 U/L (ref 15–37)
BACTERIA URNS QL MICRO: ABNORMAL /HPF
BASOPHILS # BLD: 0 K/UL (ref 0–0.1)
BASOPHILS NFR BLD: 0 % (ref 0–2)
BILIRUB SERPL-MCNC: 0.8 MG/DL (ref 0.2–1)
BILIRUB UR QL: NEGATIVE
BUN SERPL-MCNC: 19 MG/DL (ref 7–18)
BUN/CREAT SERPL: 23 (ref 12–20)
CALCIUM SERPL-MCNC: 8.3 MG/DL (ref 8.5–10.1)
CHLORIDE SERPL-SCNC: 101 MMOL/L (ref 100–108)
CO2 SERPL-SCNC: 27 MMOL/L (ref 21–32)
COLOR UR: YELLOW
CREAT SERPL-MCNC: 0.82 MG/DL (ref 0.6–1.3)
CYTOLOGIST CVX/VAG CYTO: NORMAL
CYTOLOGY CVX/VAG DOC THIN PREP: NORMAL
DIFFERENTIAL METHOD BLD: ABNORMAL
DX ICD CODE: NORMAL
EOSINOPHIL # BLD: 0.1 K/UL (ref 0–0.4)
EOSINOPHIL NFR BLD: 1 % (ref 0–5)
EPITH CASTS URNS QL MICRO: ABNORMAL /LPF (ref 0–5)
ERYTHROCYTE [DISTWIDTH] IN BLOOD BY AUTOMATED COUNT: 14.1 % (ref 11.6–14.5)
GLOBULIN SER CALC-MCNC: 3.7 G/DL (ref 2–4)
GLUCOSE SERPL-MCNC: 125 MG/DL (ref 74–99)
GLUCOSE UR STRIP.AUTO-MCNC: NEGATIVE MG/DL
HCG SERPL QL: NEGATIVE
HCT VFR BLD AUTO: 40.8 % (ref 35–45)
HGB BLD-MCNC: 13.2 G/DL (ref 12–16)
HGB UR QL STRIP: NEGATIVE
KETONES UR QL STRIP.AUTO: NEGATIVE MG/DL
LABCORP, 190119: NORMAL
LEUKOCYTE ESTERASE UR QL STRIP.AUTO: ABNORMAL
LYMPHOCYTES # BLD: 0.4 K/UL (ref 0.9–3.6)
LYMPHOCYTES NFR BLD: 4 % (ref 21–52)
Lab: NORMAL
MCH RBC QN AUTO: 26.1 PG (ref 24–34)
MCHC RBC AUTO-ENTMCNC: 32.4 G/DL (ref 31–37)
MCV RBC AUTO: 80.8 FL (ref 74–97)
MONOCYTES # BLD: 0.5 K/UL (ref 0.05–1.2)
MONOCYTES NFR BLD: 6 % (ref 3–10)
MUCOUS THREADS URNS QL MICRO: ABNORMAL /LPF
NEUTS SEG # BLD: 8.7 K/UL (ref 1.8–8)
NEUTS SEG NFR BLD: 89 % (ref 40–73)
NITRITE UR QL STRIP.AUTO: NEGATIVE
OTHER STN SPEC: NORMAL
PATH REPORT.FINAL DX SPEC: NORMAL
PH UR STRIP: 7 [PH] (ref 5–8)
PLATELET # BLD AUTO: 186 K/UL (ref 135–420)
PMV BLD AUTO: 10.8 FL (ref 9.2–11.8)
POTASSIUM SERPL-SCNC: 3.5 MMOL/L (ref 3.5–5.5)
PROT SERPL-MCNC: 7.4 G/DL (ref 6.4–8.2)
PROT UR STRIP-MCNC: NEGATIVE MG/DL
RBC # BLD AUTO: 5.05 M/UL (ref 4.2–5.3)
RBC #/AREA URNS HPF: ABNORMAL /HPF (ref 0–5)
SODIUM SERPL-SCNC: 136 MMOL/L (ref 136–145)
SP GR UR REFRACTOMETRY: 1.01 (ref 1–1.03)
STAT OF ADQ CVX/VAG CYTO-IMP: NORMAL
UROBILINOGEN UR QL STRIP.AUTO: 0.2 EU/DL (ref 0.2–1)
WBC # BLD AUTO: 9.7 K/UL (ref 4.6–13.2)
WBC URNS QL MICRO: ABNORMAL /HPF (ref 0–4)

## 2019-02-27 PROCEDURE — 96374 THER/PROPH/DIAG INJ IV PUSH: CPT

## 2019-02-27 PROCEDURE — 96361 HYDRATE IV INFUSION ADD-ON: CPT

## 2019-02-27 PROCEDURE — 74011250636 HC RX REV CODE- 250/636: Performed by: PHYSICIAN ASSISTANT

## 2019-02-27 PROCEDURE — 99283 EMERGENCY DEPT VISIT LOW MDM: CPT

## 2019-02-27 PROCEDURE — 81001 URINALYSIS AUTO W/SCOPE: CPT

## 2019-02-27 PROCEDURE — 84703 CHORIONIC GONADOTROPIN ASSAY: CPT

## 2019-02-27 PROCEDURE — 85025 COMPLETE CBC W/AUTO DIFF WBC: CPT

## 2019-02-27 PROCEDURE — 80053 COMPREHEN METABOLIC PANEL: CPT

## 2019-02-27 RX ORDER — OSELTAMIVIR PHOSPHATE 75 MG/1
75 CAPSULE ORAL 2 TIMES DAILY
Qty: 10 CAP | Refills: 0 | Status: SHIPPED | OUTPATIENT
Start: 2019-02-27 | End: 2019-03-04

## 2019-02-27 RX ORDER — ONDANSETRON 4 MG/1
4 TABLET, ORALLY DISINTEGRATING ORAL
Qty: 15 TAB | Refills: 0 | Status: SHIPPED | OUTPATIENT
Start: 2019-02-27 | End: 2019-05-16

## 2019-02-27 RX ORDER — ONDANSETRON 2 MG/ML
4 INJECTION INTRAMUSCULAR; INTRAVENOUS
Status: COMPLETED | OUTPATIENT
Start: 2019-02-27 | End: 2019-02-27

## 2019-02-27 RX ADMIN — ONDANSETRON 4 MG: 2 INJECTION INTRAMUSCULAR; INTRAVENOUS at 20:28

## 2019-02-27 RX ADMIN — SODIUM CHLORIDE 1000 ML: 900 INJECTION, SOLUTION INTRAVENOUS at 20:28

## 2019-02-27 NOTE — ED TRIAGE NOTES
Pt presents with cough and fever x 1-2 days, nausea and multiple episodes of vomiting today. Pt denies nausea at present time and tolerating po intake, denies diarrhea, reports fevers at home last took tylenol 1000 mg 4:45 pm. States son tested positive for flu last week.

## 2019-02-27 NOTE — LETTER
3/5/2019 2:14 PM 
 
[facility] [address] Dear Ms. Paradae Saniya Your pap smear was negative. Repeat is recommended for 3 years. Sincerely, Jayshree Chang MD

## 2019-02-28 NOTE — ED NOTES
Pt is aware of need for clean catch urine specimen. States she is currently unable to void. IV fluids infusing via gravity without sx's of infiltration noted to left AC IV site. Call bell within easy reach; bed in lowest position. Spouse at bedside. Will monitor.

## 2019-02-28 NOTE — ED NOTES
Pt ambulated accompanied by  to bathroom to attempt to void. Pt was provided with sterile specimen cup and instructed to collect clean catch urine specimen. Pt verbalized understanding.

## 2019-02-28 NOTE — ED NOTES
Pt given a blanket. Awaiting ua results. Instructed to call for assistance as needed; call bell within easy reach. Spouse remains at bedside. Will continue to monitor.

## 2019-02-28 NOTE — DISCHARGE INSTRUCTIONS
Patient Education        Nausea and Vomiting: Care Instructions  Your Care Instructions    When you are nauseated, you may feel weak and sweaty and notice a lot of saliva in your mouth. Nausea often leads to vomiting. Most of the time you do not need to worry about nausea and vomiting, but they can be signs of other illnesses. Two common causes of nausea and vomiting are stomach flu and food poisoning. Nausea and vomiting from viral stomach flu will usually start to improve within 24 hours. Nausea and vomiting from food poisoning may last from 12 to 48 hours. The doctor has checked you carefully, but problems can develop later. If you notice any problems or new symptoms, get medical treatment right away. Follow-up care is a key part of your treatment and safety. Be sure to make and go to all appointments, and call your doctor if you are having problems. It's also a good idea to know your test results and keep a list of the medicines you take. How can you care for yourself at home? · To prevent dehydration, drink plenty of fluids, enough so that your urine is light yellow or clear like water. Choose water and other caffeine-free clear liquids until you feel better. If you have kidney, heart, or liver disease and have to limit fluids, talk with your doctor before you increase the amount of fluids you drink. · Rest in bed until you feel better. · When you are able to eat, try clear soups, mild foods, and liquids until all symptoms are gone for 12 to 48 hours. Other good choices include dry toast, crackers, cooked cereal, and gelatin dessert, such as Jell-O. When should you call for help? Call 911 anytime you think you may need emergency care. For example, call if:    · You passed out (lost consciousness).    Call your doctor now or seek immediate medical care if:    · You have symptoms of dehydration, such as:  ? Dry eyes and a dry mouth. ? Passing only a little dark urine. ?  Feeling thirstier than usual.   · You have new or worsening belly pain.     · You have a new or higher fever.     · You vomit blood or what looks like coffee grounds.    Watch closely for changes in your health, and be sure to contact your doctor if:    · You have ongoing nausea and vomiting.     · Your vomiting is getting worse.     · Your vomiting lasts longer than 2 days.     · You are not getting better as expected. Where can you learn more? Go to http://erica-venice.info/. Enter [id number] in the search box to learn more about \"Nausea and Vomiting: Care Instructions. \"  Current as of: 2018  Content Version: 11.9  © 8665-2192 [employer]. Care instructions adapted under license by [employer] (which disclaims liability or warranty for this information). If you have questions about a medical condition or this instruction, always ask your healthcare professional. [address] any warranty or liability for your use of this information. Hello Curry Activation    Thank you for requesting access to Hello [employer]. Please follow the instructions below to securely access and download your online medical record. Hello [employer] allows you to send messages to your doctor, view your test results, renew your prescriptions, schedule appointments, and more. How Do I Sign Up? 1. In your internet browser, go to [email or web address]  2. Click on the First Time User? Click Here link in the Sign In box. You will be redirect to the New Member Sign Up page. 3. Enter your Hello Curry Access Code exactly as it appears below. You will not need to use this code after youve completed the sign-up process. If you do not sign up before the expiration date, you must request a new code. Hello [employer] Access Code: Activation code not generated  Current Hello Curry Status: Active (This is the date your Hello Curry access code will )    4.  Enter the last four digits of your Social Security Number (xxxx) and Date of Birth (mm/dd/yyyy) as indicated and click Submit. You will be taken to the next sign-up page. 5. Create a [employer] ID. This will be your [employer] login ID and cannot be changed, so think of one that is secure and easy to remember. 6. Create a [employer] password. You can change your password at any time. 7. Enter your Password Reset Question and Answer. This can be used at a later time if you forget your password. 8. Enter your e-mail address. You will receive e-mail notification when new information is available in [address] 9. Click Sign Up. You can now view and download portions of your medical record. 10. Click the Download Summary menu link to download a portable copy of your medical information. Additional Information    If you have questions, please visit the Frequently Asked Questions section of the [employer] website at [email or web address] Remember, [employer] is NOT to be used for urgent needs. For medical emergencies, dial 911. Complete all medications as prescribed. Follow-up with primary care doctor in 1 week. Return to the ED immediately for any new or worsening symptoms.

## 2019-02-28 NOTE — ED PROVIDER NOTES
EMERGENCY DEPARTMENT HISTORY AND PHYSICAL EXAM    Date: 2/27/2019  Patient Name: Jamarcus Chopra    History of Presenting Illness     Chief Complaint   Patient presents with    Vomiting    Cough    Fever         History Provided By: patient     Chief Complaint: flu like sx  Duration: 1 day  Timing: acute  Location: upper resp  Quality:aching   Severity moderate  Modifying Factors: none   Associated Symptoms: body aches, fever, chills, cough, congestion, runny nose, nausea/vomtiing      Additional History (Context): Jamarcus Chopra is a 41 y.o. female with PMH ADD and raynaud's phenomenon who presents with complaints of flu like sx to include body aches, fever, chills, cough, congestion, runny nose, and N/V x 1 day. Denies tx PTA. No other complaints. PCP: Thu Deng MD    Current Outpatient Medications   Medication Sig Dispense Refill    oseltamivir (TAMIFLU) 75 mg capsule Take 1 Cap by mouth two (2) times a day for 5 days. 10 Cap 0    ondansetron (ZOFRAN ODT) 4 mg disintegrating tablet Take 1 Tab by mouth every eight (8) hours as needed for Nausea. 15 Tab 0    cetirizine (ZYRTEC) 10 mg tablet Take  by mouth.  ergocalciferol (ERGOCALCIFEROL) 50,000 unit capsule Take 1 Cap by mouth every seven (7) days. 12 Cap 1    amphetamine-dextroamphetamine XR (ADDERALL XR) 25 mg XR capsule Take 1 Cap (25 mg total) by mouth every morningEarliest Fill Date: 2/24/19.   Max Daily Amount: 25 mg 30 Cap 0    montelukast (SINGULAIR) 10 mg tablet TAKE ONE TABLET BY MOUTH EVERY EVENING 30 Tab 5    triamcinolone (NASACORT AQ) 55 mcg nasal inhaler USE 2 SPRAYS IN EACH NOSTRIL 2 TIMES A DAY 1 Bottle 2    naproxen (NAPROSYN) 375 mg tablet TAKE ONE TABLET BY MOUTH TWICE DAILY WITH MEALS 60 Tab 1       Past History     Past Medical History:  Past Medical History:   Diagnosis Date    ADD (attention deficit disorder)     Allergic rhinitis     Chronic low back pain     Palpitations     Positive ALIS (antinuclear antibody) 05/2017    PVC's (premature ventricular contractions)     Raynaud's phenomenon        Past Surgical History:  History reviewed. No pertinent surgical history. Family History:  Family History   Problem Relation Age of Onset    Hypertension Mother        Social History:  Social History     Tobacco Use    Smoking status: Never Smoker    Smokeless tobacco: Never Used   Substance Use Topics    Alcohol use: Yes     Alcohol/week: 0.6 oz     Types: 1 Shots of liquor per week     Comment: 2 monthly    Drug use: No       Allergies: Allergies   Allergen Reactions    Cephalosporins Shortness of Breath and Rash         Review of Systems   Review of Systems   Constitutional: Positive for chills and fever. HENT: Positive for congestion and rhinorrhea. Negative for ear pain and sore throat. Eyes: Negative. Negative for pain and redness. Respiratory: Positive for cough. Negative for shortness of breath, wheezing and stridor. Cardiovascular: Negative. Negative for chest pain and leg swelling. Gastrointestinal: Positive for nausea and vomiting. Negative for abdominal pain, constipation and diarrhea. Genitourinary: Negative. Negative for dysuria and frequency. Musculoskeletal: Positive for myalgias. Negative for back pain and neck pain. Skin: Negative. Negative for rash and wound. Neurological: Negative. Negative for dizziness, seizures, syncope and headaches. All other systems reviewed and are negative. All Other Systems Negative  Physical Exam     Vitals:    02/27/19 1831 02/27/19 1841 02/27/19 2129   BP: 125/74     Pulse: 93     Resp: 18     Temp: 99.9 °F (37.7 °C)  99.2 °F (37.3 °C)   SpO2: 98% 98%    Weight: 70.3 kg (155 lb)     Height: 5' 9\" (1.753 m)       Physical Exam   Constitutional: She is oriented to person, place, and time. She appears well-developed and well-nourished. No distress. HENT:   Head: Normocephalic and atraumatic.    Right Ear: External ear normal.   Left Ear: External ear normal.   Mouth/Throat: Oropharynx is clear and moist. No oropharyngeal exudate. Mild erythematous turbinates, bilateral nasal congestion    Eyes: Conjunctivae are normal. Right eye exhibits no discharge. Left eye exhibits no discharge. No scleral icterus. Neck: Normal range of motion. Neck supple. Cardiovascular: Normal rate, regular rhythm and normal heart sounds. Exam reveals no gallop and no friction rub. No murmur heard. Pulmonary/Chest: Effort normal and breath sounds normal. No stridor. No respiratory distress. She has no wheezes. She has no rales. Musculoskeletal: Normal range of motion. Neurological: She is alert and oriented to person, place, and time. Coordination normal.   Gait is steady. Able to ambulate without difficulty. Skin: Skin is warm and dry. No rash noted. She is not diaphoretic. No erythema. Psychiatric: She has a normal mood and affect. Her behavior is normal. Thought content normal.   Nursing note and vitals reviewed.              Diagnostic Study Results     Labs -     Recent Results (from the past 12 hour(s))   URINALYSIS W/ RFLX MICROSCOPIC    Collection Time: 02/27/19  6:43 PM   Result Value Ref Range    Color YELLOW      Appearance CLEAR      Specific gravity 1.014 1.005 - 1.030      pH (UA) 7.0 5.0 - 8.0      Protein NEGATIVE  NEG mg/dL    Glucose NEGATIVE  NEG mg/dL    Ketone NEGATIVE  NEG mg/dL    Bilirubin NEGATIVE  NEG      Blood NEGATIVE  NEG      Urobilinogen 0.2 0.2 - 1.0 EU/dL    Nitrites NEGATIVE  NEG      Leukocyte Esterase SMALL (A) NEG     HCG QL SERUM    Collection Time: 02/27/19  8:27 PM   Result Value Ref Range    HCG, Ql. NEGATIVE  NEG     CBC WITH AUTOMATED DIFF    Collection Time: 02/27/19  8:27 PM   Result Value Ref Range    WBC 9.7 4.6 - 13.2 K/uL    RBC 5.05 4.20 - 5.30 M/uL    HGB 13.2 12.0 - 16.0 g/dL    HCT 40.8 35.0 - 45.0 %    MCV 80.8 74.0 - 97.0 FL    MCH 26.1 24.0 - 34.0 PG    MCHC 32.4 31.0 - 37.0 g/dL    RDW 14.1 11.6 - 14.5 %    PLATELET 068 443 - 558 K/uL    MPV 10.8 9.2 - 11.8 FL    NEUTROPHILS 89 (H) 40 - 73 %    LYMPHOCYTES 4 (L) 21 - 52 %    MONOCYTES 6 3 - 10 %    EOSINOPHILS 1 0 - 5 %    BASOPHILS 0 0 - 2 %    ABS. NEUTROPHILS 8.7 (H) 1.8 - 8.0 K/UL    ABS. LYMPHOCYTES 0.4 (L) 0.9 - 3.6 K/UL    ABS. MONOCYTES 0.5 0.05 - 1.2 K/UL    ABS. EOSINOPHILS 0.1 0.0 - 0.4 K/UL    ABS. BASOPHILS 0.0 0.0 - 0.1 K/UL    DF AUTOMATED     METABOLIC PANEL, COMPREHENSIVE    Collection Time: 02/27/19  8:27 PM   Result Value Ref Range    Sodium 136 136 - 145 mmol/L    Potassium 3.5 3.5 - 5.5 mmol/L    Chloride 101 100 - 108 mmol/L    CO2 27 21 - 32 mmol/L    Anion gap 8 3.0 - 18 mmol/L    Glucose 125 (H) 74 - 99 mg/dL    BUN 19 (H) 7.0 - 18 MG/DL    Creatinine 0.82 0.6 - 1.3 MG/DL    BUN/Creatinine ratio 23 (H) 12 - 20      GFR est AA >60 >60 ml/min/1.73m2    GFR est non-AA >60 >60 ml/min/1.73m2    Calcium 8.3 (L) 8.5 - 10.1 MG/DL    Bilirubin, total 0.8 0.2 - 1.0 MG/DL    ALT (SGPT) 20 13 - 56 U/L    AST (SGOT) 14 (L) 15 - 37 U/L    Alk. phosphatase 78 45 - 117 U/L    Protein, total 7.4 6.4 - 8.2 g/dL    Albumin 3.7 3.4 - 5.0 g/dL    Globulin 3.7 2.0 - 4.0 g/dL    A-G Ratio 1.0 0.8 - 1.7         Radiologic Studies -   No orders to display     CT Results  (Last 48 hours)    None        CXR Results  (Last 48 hours)    None            Medical Decision Making   I am the first provider for this patient. I reviewed the vital signs, available nursing notes, past medical history, past surgical history, family history and social history. Vital Signs-Reviewed the patient's vital signs. Records Reviewed: Ama Garcia PA-C     Procedures:  Procedures    Provider Notes (Medical Decision Making): Impression:  Flu like sx    IV inserted saline and zofran given, sx improving, pt has flu like sx and is within the window of tx with tamiflu, will plan to d/c with tamiflu and zofran, pcp follow-up recommended. Pt agrees with this plan.  Ama PANIAGUA BEN Garcia     MED RECONCILIATION:  No current facility-administered medications for this encounter. Current Outpatient Medications   Medication Sig    oseltamivir (TAMIFLU) 75 mg capsule Take 1 Cap by mouth two (2) times a day for 5 days.  ondansetron (ZOFRAN ODT) 4 mg disintegrating tablet Take 1 Tab by mouth every eight (8) hours as needed for Nausea.  cetirizine (ZYRTEC) 10 mg tablet Take  by mouth.  ergocalciferol (ERGOCALCIFEROL) 50,000 unit capsule Take 1 Cap by mouth every seven (7) days.  amphetamine-dextroamphetamine XR (ADDERALL XR) 25 mg XR capsule Take 1 Cap (25 mg total) by mouth every morningEarliest Fill Date: 2/24/19. Max Daily Amount: 25 mg    montelukast (SINGULAIR) 10 mg tablet TAKE ONE TABLET BY MOUTH EVERY EVENING    triamcinolone (NASACORT AQ) 55 mcg nasal inhaler USE 2 SPRAYS IN EACH NOSTRIL 2 TIMES A DAY    naproxen (NAPROSYN) 375 mg tablet TAKE ONE TABLET BY MOUTH TWICE DAILY WITH MEALS       Disposition:  D/c    DISCHARGE NOTE:   Patient is stable for discharge at this time. Rx for zofran and tamiflu given. Rest and follow-up with PCP this week. Return to the ED immediately for any new or worsening sx. April Julisa Billingsley PA-C 9:49 PM     Follow-up Information     Follow up With Specialties Details Why Contact Info    Shilpi Anderson MD Internal Medicine Schedule an appointment as soon as possible for a visit in 1 week  [address] [address] [phone] [address] [facility] EMERGENCY DEPT Emergency Medicine  As needed, If symptoms worsen [address] [facility]  [phone]          Current Discharge Medication List      START taking these medications    Details   oseltamivir (TAMIFLU) 75 mg capsule Take 1 Cap by mouth two (2) times a day for 5 days. Qty: 10 Cap, Refills: 0      ondansetron (ZOFRAN ODT) 4 mg disintegrating tablet Take 1 Tab by mouth every eight (8) hours as needed for Nausea.   Qty: 15 Tab, Refills: 0               Diagnosis     Clinical Impression:   1. Flu-like symptoms    2.  Non-intractable vomiting with nausea, unspecified vomiting type

## 2019-03-02 PROBLEM — E55.9 VITAMIN D DEFICIENCY: Status: ACTIVE | Noted: 2019-03-02

## 2019-03-02 PROBLEM — E78.5 DYSLIPIDEMIA: Status: ACTIVE | Noted: 2019-03-02

## 2019-03-02 PROBLEM — Z86.39 HX OF NON ANEMIC VITAMIN B12 DEFICIENCY: Status: RESOLVED | Noted: 2017-05-24 | Resolved: 2019-03-02

## 2019-03-02 NOTE — PROGRESS NOTES
HPI:   Darryle Sours is a 41y.o. year old female who presents today for a physical exam and well woman exam. She has a history of allergic rhinitis, attention deficit disorder, Raynaud's phenomenon, and chronic neck and back pain. She reports that she is doing well and is currently without complaints. She was evaluated by JENNIE Will on 2/19/2019 for right arm pain, and was prescribed a Sterapred dose pack with improvement. She states that her symptoms have resolved. She states that she has a long history dating back for at least 10 years where she has been experiencing chronic pain, particularly in her lower back with bilateral numbness and tingling in both legs, and in her neck. She was evaluated in [id number] by Dr. Carson Smith, and underwent EMG/ NCS reportedly showing right C7-8 radiculopathy, right L5 radiculopathy and bilateral sensory polyneuropathy. She had a lumbar MRI at [facility] showing mild degenerative disc changes. She also had a brain MRI which was negative. She was reportedly found to have a low B12 level and was treated with a supplement. She has used naproxen intermittently over the years for discomfort. In 5/2017, she presented to Dr. Mckenna Arredondo for evaluation and underwent repeat EMG/ NCS, which were reportedly \"normal\". She also had ALIS which was strongly positive 1:1280 in speckled pattern. RF, ESR, TSH, and folate were normal. B12 was low normal, but MMA was in normal range. She was last seen on 10/14/2017 and pain in her ribs and neck, but most significant was pain in her lower back with bilateral sciatica. She denied pain in her hands or other peripheral joints, and denied rashes, oral ulcers, shortness of breath, or hematuria. She also reported episodes where her hands and feet become cold, white, and painful, and she stated that she carried hand and foot warmers with her year round. Lab evaluation included ESR, C-reactive protein, rheumatoid factor and CCP, and HLA-B27 which were all negative. She was referred to rheumatology but did not show for her appointment. She has a history of palpitations, and presented to [facility] ED in 12/2016 for evaluation. EKG showed frequent PVCs, and she was advised to decrease her caffeine consumption. She states that the palpitations seem to have improved, and she is no longer experiencing episodes. She does not exercise regularly. She denies any chest pain, shortness of breath at rest or with exertion, lightheadedness, or edema. She does have a history of attention deficit disorder, diagnosed at the age of 16. She was initially treated with Ritalin, but discontinued taking it. She has been treated with Adderall for the last two years. She has a history of allergic rhinitis and seasonal allergies. She is being treated with Singulair and Nasacort. Past Medical History:   Diagnosis Date    ADD (attention deficit disorder)     Allergic rhinitis     Chronic low back pain     Palpitations     Positive ALIS (antinuclear antibody) 05/2017    PVC's (premature ventricular contractions)     Raynaud's phenomenon      History reviewed. No pertinent surgical history. Current Outpatient Medications   Medication Sig    cetirizine (ZYRTEC) 10 mg tablet Take  by mouth.  ergocalciferol (ERGOCALCIFEROL) 50,000 unit capsule Take 1 Cap by mouth every seven (7) days.  amphetamine-dextroamphetamine XR (ADDERALL XR) 25 mg XR capsule Take 1 Cap (25 mg total) by mouth every morningEarliest Fill Date: 2/24/19. Max Daily Amount: 25 mg    montelukast (SINGULAIR) 10 mg tablet TAKE ONE TABLET BY MOUTH EVERY EVENING    triamcinolone (NASACORT AQ) 55 mcg nasal inhaler USE 2 SPRAYS IN EACH NOSTRIL 2 TIMES A DAY    oseltamivir (TAMIFLU) 75 mg capsule Take 1 Cap by mouth two (2) times a day for 5 days.  ondansetron (ZOFRAN ODT) 4 mg disintegrating tablet Take 1 Tab by mouth every eight (8) hours as needed for Nausea.     naproxen (NAPROSYN) 375 mg tablet TAKE ONE TABLET BY MOUTH TWICE DAILY WITH MEALS     No current facility-administered medications for this visit. Allergies and Intolerances: Allergies   Allergen Reactions    Cephalosporins Shortness of Breath and Rash     Family History: Her mother has sarcoid and was diagnosed with DCIS breast cancer at the age of 62. She is s/p bilateral mastectomy. Her paternal grandmother had breast and ovarian cancer. She has no family history of colon cancer. Family History   Problem Relation Age of Onset    Hypertension Mother      Social History:   She  reports that  has never smoked. she has never used smokeless tobacco. She is  with two children. She was working as the  for a [employer], but she is no longer working. Social History     Substance and Sexual Activity   Alcohol Use Yes    Alcohol/week: 0.6 oz    Types: 1 Shots of liquor per week    Comment: 2 monthly     Immunization History:  Immunization History   Administered Date(s) Administered    TD Vaccine 10/19/2005    Tdap 10/04/2017       Review of Systems:   As above included in HPI. Otherwise 11 point review of systems negative including constitutional, skin, HENT, eyes, respiratory, cardiovascular, gastrointestinal, genitourinary, musculoskeletal, endocrine, hematologic, allergy, and neurologic. Physical:   Vitals:   BP: 138/86; repeat 126/74 right arm  HR: 100  WT: 152 lb 3.2 oz (69 kg)  BMI:  22.89 kg/m2    Exam:   Patient appears in no apparent distress. Affect is appropriate. HEENT --Anicteric sclerae, tympanic membranes normal,  ear canals normal.  PERRL, EOMI, conjunctiva and lids normal.   Sinuses were nontender, turbinates normal, hearing normal.  Oropharynx without  erythema, normal tongue, oral mucosa and tonsils. No cervical lymphadenopathy. No thyromegaly, JVD, or bruits. Carotid pulses 2+ with normal upstroke. Lungs --Clear to auscultation. No wheezing or rales.   Heart --Regular rate and rhythm, no murmurs, rubs, gallops, or clicks. Breasts -- no masses, skin dimpling, asymmetry, nipple discharge, nodules, axillary lymphadenopathy. Chest wall --Nontender to palpation. PMI normal.  Abdomen -- Soft and nontender, no hepatosplenomegaly or masses. Pelvis -- normal external genitalia, normal vagina, cervix, no adnexal masses or tenderness; pap smear performed  Extremities -- Without cyanosis, clubbing, edema. 2+ pulses equally and bilaterally. Normal looking digits, ROM intact  Neuro -- CN 2-12 intact, strength 5/5 with intact soft touch in all extremities, cerebellar  exam finger to nose test normal, 2+DTRs  Derm - no obvious abnormalities noted, no rash          Review of Data:  Labs:  Hospital Outpatient Visit on 02/23/2019   Component Date Value Ref Range Status    [facility]. 02/23/2019 Specimens collected/sent to [facility]. Please direct inquiries to ([phone]). Final   Orders Only on 02/20/2019   Component Date Value Ref Range Status    WBC 02/23/2019 8.6  3.4 - 10.8 x10E3/uL Final    RBC 02/23/2019 4.77  3.77 - 5.28 x10E6/uL Final    HGB 02/23/2019 12.3  11.1 - 15.9 g/dL Final    HCT 02/23/2019 38.0  34.0 - 46.6 % Final    MCV 02/23/2019 80  79 - 97 fL Final    MCH 02/23/2019 25.8* 26.6 - 33.0 pg Final    MCHC 02/23/2019 32.4  31.5 - 35.7 g/dL Final    RDW 02/23/2019 15.0  12.3 - 15.4 % Final    PLATELET 20/68/1900 890  150 - 379 x10E3/uL Final    NEUTROPHILS 02/23/2019 73  Not Estab. % Final    Lymphocytes 02/23/2019 20  Not Estab. % Final    MONOCYTES 02/23/2019 7  Not Estab. % Final    EOSINOPHILS 02/23/2019 0  Not Estab. % Final    BASOPHILS 02/23/2019 0  Not Estab. % Final    ABS. NEUTROPHILS 02/23/2019 6.3  1.4 - 7.0 x10E3/uL Final    Abs Lymphocytes 02/23/2019 1.7  0.7 - 3.1 x10E3/uL Final    ABS. MONOCYTES 02/23/2019 0.6  0.1 - 0.9 x10E3/uL Final    ABS. EOSINOPHILS 02/23/2019 0.0  0.0 - 0.4 x10E3/uL Final    ABS.  BASOPHILS 02/23/2019 0.0  0.0 - 0.2 x10E3/uL Final    IMMATURE GRANULOCYTES 02/23/2019 0  Not Estab. % Final    ABS. IMM. GRANS. 02/23/2019 0.0  0.0 - 0.1 x10E3/uL Final    Cholesterol, total 02/23/2019 174  100 - 199 mg/dL Final    Triglyceride 02/23/2019 100  0 - 149 mg/dL Final    HDL Cholesterol 02/23/2019 51  >39 mg/dL Final    VLDL, calculated 02/23/2019 20  5 - 40 mg/dL Final    LDL, calculated 02/23/2019 103* 0 - 99 mg/dL Final    Glucose 02/23/2019 99  65 - 99 mg/dL Final    BUN 02/23/2019 16  6 - 20 mg/dL Final    Creatinine 02/23/2019 0.80  0.57 - 1.00 mg/dL Final    GFR est non-AA 02/23/2019 94  >59 mL/min/1.73 Final    GFR est AA 02/23/2019 109  >59 mL/min/1.73 Final    BUN/Creatinine ratio 02/23/2019 20  9 - 23 Final    Sodium 02/23/2019 140  134 - 144 mmol/L Final    Potassium 02/23/2019 3.9  3.5 - 5.2 mmol/L Final    Chloride 02/23/2019 102  96 - 106 mmol/L Final    CO2 02/23/2019 22  20 - 29 mmol/L Final    Calcium 02/23/2019 9.3  8.7 - 10.2 mg/dL Final    Protein, total 02/23/2019 7.4  6.0 - 8.5 g/dL Final    Albumin 02/23/2019 4.2  3.5 - 5.5 g/dL Final    GLOBULIN, TOTAL 02/23/2019 3.2  1.5 - 4.5 g/dL Final    A-G Ratio 02/23/2019 1.3  1.2 - 2.2 Final    Bilirubin, total 02/23/2019 0.4  0.0 - 1.2 mg/dL Final    Alk.  phosphatase 02/23/2019 71  39 - 117 IU/L Final    AST (SGOT) 02/23/2019 11  0 - 40 IU/L Final    ALT (SGPT) 02/23/2019 11  0 - 32 IU/L Final    TSH 02/23/2019 0.666  0.450 - 4.500 uIU/mL Final    Specific Gravity 02/23/2019 1.017  1.005 - 1.030 Final    pH (UA) 02/23/2019 6.5  5.0 - 7.5 Final    Color 02/23/2019 Yellow  Yellow Final    Appearance 02/23/2019 Clear  Clear Final    Leukocyte Esterase 02/23/2019 Negative  Negative Final    Protein 02/23/2019 Negative  Negative/Trace Final    Glucose 02/23/2019 Negative  Negative Final    Ketone 02/23/2019 Negative  Negative Final    Blood 02/23/2019 Negative  Negative Final    Bilirubin 02/23/2019 Negative  Negative Final    Urobilinogen 02/23/2019 0.2 0.2 - 1.0 mg/dL Final    Nitrites 02/23/2019 Negative  Negative Final    Microscopic Examination 02/23/2019 Comment   Final    Microscopic exam 02/23/2019 See additional order   Final    VITAMIN D, 25-HYDROXY 02/23/2019 4.8* 30.0 - 100.0 ng/mL Final   Office Visit on 02/19/2019   Component Date Value Ref Range Status    WBC 02/23/2019 0-5  0 - 5 /hpf Final    RBC 02/23/2019 0-2  0 - 2 /hpf Final    Epithelial cells 02/23/2019 >10* 0 - 10 /hpf Final    Casts 02/23/2019 None seen  None seen /lpf Final    Mucus 02/23/2019 Present  Not Estab. Final    Bacteria 02/23/2019 Many* None seen/Few Final    INTERPRETATION 02/23/2019 Note   Final     Calculated 10 year ASCVD risk score: 0.2 %    Health Maintenance:  Screening:    Mammogram: mother with DCIS; has not had baseline mammogram performed   PAP smear: performed today   Colorectal: N/A   Depression: none   DM (HbA1c/FPG): FPG 99 (2/2019)   Hepatitis C: N/A   Falls: none   DEXA: N/A   Glaucoma: unknown   Smoking: none   Vitamin D: 4.8 (2/2019)   Medicare Wellness: N/A        Impression:  Patient Active Problem List   Diagnosis Code    Palpitations R00.2    Allergic rhinitis J30.9    ADD (attention deficit disorder) F98.8    Arthralgia M25.50    Raynaud's phenomenon I73.00    Positive ALIS (antinuclear antibody) R76.8    Chronic low back pain M54.5, G89.29    Controlled substance agreement signed Z79.899    Hx of non anemic vitamin B12 deficiency Z86.39    Paresthesia and pain of both upper extremities R20.2, M79.601, M79.602    Paresthesia of both lower extremities R20.2       Plan:  1. Chronic low back pain. Patient without significant complaints today. She has reported intermittent long standing symptoms involving her lower back with sciatica. MRI lumbar from 2009 without significant disease to explain symptoms. Described pain particularly in sacroiliac joints, and also in her ribs and neck.  Labs at last visit including HLA-B27, ESR, CRP, RF, and CCP negative. Referred to Dr. Xiomara Akhtar last visit, but due to prior no-shows at their office, were unwilling to schedule her for an appointment. Referred to another group, but did not schedule. 2. Positive ALIS. Strongly positive at 1:1280 speckled pattern and scleroderma Ab positive at 4.8. Unclear significance, but currently not reporting symptoms. 3. Raynaud's phenomenon. Symptoms appear to be consistent with Raynaud's although does not describe post warming hyperemia. Follow. 4. Palpitations. Observed to be frequent PVCs in the ED. May be contributed to by Adderall use. No longer an issue. Follow. 5. Attention deficit disorder. Continue Adderall. 6. Allergic rhinitis. Symptoms appear controlled on Singulair and Nasacort. Follow. 7. Mild hyperlipidemia.  is above goal of >100. Recommended dietary changes and increase in exercise. Follow. 8. Health maintenance. Patient unwilling to obtain an influenza vaccine. Received Tdap and other immunizations up to date. Vitmain D level very low at 4.8. Will prescribe ergocalciferol 50,000 U weekly and recheck in 3 months. Well woman exam and pap smear performed today. Will obtain baseline mammogram given family history of breast cancer in her mother. Recommended to obtain regular eye exams. Patient understands recommendations and agrees with plan.   Follow-up in 12 months for physical.

## 2019-03-04 NOTE — TELEPHONE ENCOUNTER
Called and left a message for patient to give us a call back regarding the message from Dr. Althea Wellington regarding patient's lab results.

## 2019-03-21 DIAGNOSIS — F98.8 ATTENTION DEFICIT DISORDER, UNSPECIFIED HYPERACTIVITY PRESENCE: ICD-10-CM

## 2019-03-21 RX ORDER — DEXTROAMPHETAMINE SACCHARATE, AMPHETAMINE ASPARTATE MONOHYDRATE, DEXTROAMPHETAMINE SULFATE AND AMPHETAMINE SULFATE 6.25; 6.25; 6.25; 6.25 MG/1; MG/1; MG/1; MG/1
25 CAPSULE, EXTENDED RELEASE ORAL
Qty: 30 CAP | Refills: 0 | Status: SHIPPED | OUTPATIENT
Start: 2019-03-21 | End: 2019-04-24 | Stop reason: SDUPTHER

## 2019-03-21 NOTE — TELEPHONE ENCOUNTER
LAST OV: 2/26/19  LAST FILL: 2/24/19    Reviewed report generated by the [address] [place] [address]. Does not demonstrate aberrancies or inconsistencies with regard to the prescribing of controlled medications to this patient by other providers.

## 2019-04-24 DIAGNOSIS — F98.8 ATTENTION DEFICIT DISORDER, UNSPECIFIED HYPERACTIVITY PRESENCE: ICD-10-CM

## 2019-04-24 RX ORDER — DEXTROAMPHETAMINE SACCHARATE, AMPHETAMINE ASPARTATE MONOHYDRATE, DEXTROAMPHETAMINE SULFATE AND AMPHETAMINE SULFATE 6.25; 6.25; 6.25; 6.25 MG/1; MG/1; MG/1; MG/1
25 CAPSULE, EXTENDED RELEASE ORAL
Qty: 30 CAP | Refills: 0 | Status: SHIPPED | OUTPATIENT
Start: 2019-04-24 | End: 2019-05-29 | Stop reason: SDUPTHER

## 2019-04-24 NOTE — TELEPHONE ENCOUNTER
PCP: Cliff Combs MD    Last appt: 2/26/2019  Future Appointments   Date Time Provider Josefa Valladares   5/31/2019  8:30 AM IOC NURSE VISIT [facility] SCHED   2/25/2020  8:45 AM IO NURSE VISIT [facility] SCHED   3/3/2020  9:00 AM Cliff Combs MD [address]       Requested Prescriptions     Pending Prescriptions Disp Refills    amphetamine-dextroamphetamine XR (ADDERALL XR) 25 mg XR capsule 30 Cap 0     Sig: Take 1 Cap by mouth every morning. Max Daily Amount: 25 mg.  reviewed, last fill 3-27-19.

## 2019-05-03 RX ORDER — ERGOCALCIFEROL 1.25 MG/1
50000 CAPSULE ORAL
Qty: 12 CAP | Refills: 3 | OUTPATIENT
Start: 2019-05-03

## 2019-05-03 NOTE — TELEPHONE ENCOUNTER
Chase Cass is requesting a 80 day supply    Last Visit: 2/26/19 with MD Franklin Liu  Next Appointment: 3/3/20 with MD Franklin Liu    Requested Prescriptions     Pending Prescriptions Disp Refills    ergocalciferol (ERGOCALCIFEROL) 50,000 unit capsule 12 Cap 3     Sig: Take 1 Cap by mouth every seven (7) days.

## 2019-05-03 NOTE — TELEPHONE ENCOUNTER
Was supposed to be treated with ergocalciferol for 12 weeks, and has an appointment at the end of this month to recheck Vitamin D level. Patient should resume maintenance dose Vitamin D3 2000 U daily OTC after completing until level repeated.

## 2019-05-16 ENCOUNTER — HOSPITAL ENCOUNTER (EMERGENCY)
Age: 39
Discharge: HOME | End: 2019-05-16
Attending: EMERGENCY MEDICINE
Payer: COMMERCIAL

## 2019-05-16 VITALS
WEIGHT: 145 LBS | TEMPERATURE: 98.9 F | HEIGHT: 69 IN | DIASTOLIC BLOOD PRESSURE: 80 MMHG | HEART RATE: 68 BPM | RESPIRATION RATE: 14 BRPM | OXYGEN SATURATION: 100 % | BODY MASS INDEX: 21.48 KG/M2 | SYSTOLIC BLOOD PRESSURE: 121 MMHG

## 2019-05-16 DIAGNOSIS — N30.01 ACUTE CYSTITIS WITH HEMATURIA: Primary | ICD-10-CM

## 2019-05-16 LAB
ALBUMIN SERPL-MCNC: 4 G/DL (ref 3.4–5)
ALBUMIN/GLOB SERPL: 1.1 {RATIO} (ref 0.8–1.7)
ALP SERPL-CCNC: 75 U/L (ref 45–117)
ALT SERPL-CCNC: 16 U/L (ref 13–56)
ANION GAP SERPL CALC-SCNC: 5 MMOL/L (ref 3–18)
APPEARANCE UR: CLEAR
AST SERPL-CCNC: 15 U/L (ref 15–37)
BACTERIA URNS QL MICRO: ABNORMAL /HPF
BASOPHILS # BLD: 0 K/UL (ref 0–0.1)
BASOPHILS NFR BLD: 0 % (ref 0–2)
BILIRUB SERPL-MCNC: 0.5 MG/DL (ref 0.2–1)
BILIRUB UR QL: NEGATIVE
BUN SERPL-MCNC: 13 MG/DL (ref 7–18)
BUN/CREAT SERPL: 16 (ref 12–20)
CALCIUM SERPL-MCNC: 9.1 MG/DL (ref 8.5–10.1)
CHLORIDE SERPL-SCNC: 104 MMOL/L (ref 100–108)
CO2 SERPL-SCNC: 31 MMOL/L (ref 21–32)
COLOR UR: YELLOW
CREAT SERPL-MCNC: 0.81 MG/DL (ref 0.6–1.3)
DIFFERENTIAL METHOD BLD: ABNORMAL
EOSINOPHIL # BLD: 0.2 K/UL (ref 0–0.4)
EOSINOPHIL NFR BLD: 2 % (ref 0–5)
ERYTHROCYTE [DISTWIDTH] IN BLOOD BY AUTOMATED COUNT: 14.5 % (ref 11.6–14.5)
GLOBULIN SER CALC-MCNC: 3.5 G/DL (ref 2–4)
GLUCOSE SERPL-MCNC: 79 MG/DL (ref 74–99)
GLUCOSE UR STRIP.AUTO-MCNC: NEGATIVE MG/DL
HCG UR QL: NEGATIVE
HCT VFR BLD AUTO: 39.7 % (ref 35–45)
HGB BLD-MCNC: 12.7 G/DL (ref 12–16)
HGB UR QL STRIP: ABNORMAL
KETONES UR QL STRIP.AUTO: NEGATIVE MG/DL
LEUKOCYTE ESTERASE UR QL STRIP.AUTO: ABNORMAL
LYMPHOCYTES # BLD: 1 K/UL (ref 0.9–3.6)
LYMPHOCYTES NFR BLD: 15 % (ref 21–52)
MCH RBC QN AUTO: 26.2 PG (ref 24–34)
MCHC RBC AUTO-ENTMCNC: 32 G/DL (ref 31–37)
MCV RBC AUTO: 81.9 FL (ref 74–97)
MONOCYTES # BLD: 0.5 K/UL (ref 0.05–1.2)
MONOCYTES NFR BLD: 7 % (ref 3–10)
NEUTS SEG # BLD: 5.4 K/UL (ref 1.8–8)
NEUTS SEG NFR BLD: 76 % (ref 40–73)
NITRITE UR QL STRIP.AUTO: NEGATIVE
PH UR STRIP: 5.5 [PH] (ref 5–8)
PLATELET # BLD AUTO: 234 K/UL (ref 135–420)
PMV BLD AUTO: 10.5 FL (ref 9.2–11.8)
POTASSIUM SERPL-SCNC: 4.4 MMOL/L (ref 3.5–5.5)
PROT SERPL-MCNC: 7.5 G/DL (ref 6.4–8.2)
PROT UR STRIP-MCNC: NEGATIVE MG/DL
RBC # BLD AUTO: 4.85 M/UL (ref 4.2–5.3)
RBC #/AREA URNS HPF: ABNORMAL /HPF (ref 0–5)
SODIUM SERPL-SCNC: 140 MMOL/L (ref 136–145)
SP GR UR REFRACTOMETRY: 1 (ref 1–1.03)
UROBILINOGEN UR QL STRIP.AUTO: 0.2 EU/DL (ref 0.2–1)
WBC # BLD AUTO: 7.1 K/UL (ref 4.6–13.2)
WBC URNS QL MICRO: ABNORMAL /HPF (ref 0–4)

## 2019-05-16 PROCEDURE — 99283 EMERGENCY DEPT VISIT LOW MDM: CPT

## 2019-05-16 PROCEDURE — 74011250637 HC RX REV CODE- 250/637: Performed by: PHYSICIAN ASSISTANT

## 2019-05-16 PROCEDURE — 96360 HYDRATION IV INFUSION INIT: CPT

## 2019-05-16 PROCEDURE — 81001 URINALYSIS AUTO W/SCOPE: CPT

## 2019-05-16 PROCEDURE — 81025 URINE PREGNANCY TEST: CPT

## 2019-05-16 PROCEDURE — 80053 COMPREHEN METABOLIC PANEL: CPT

## 2019-05-16 PROCEDURE — 85025 COMPLETE CBC W/AUTO DIFF WBC: CPT

## 2019-05-16 PROCEDURE — 74011250636 HC RX REV CODE- 250/636: Performed by: PHYSICIAN ASSISTANT

## 2019-05-16 RX ORDER — NITROFURANTOIN 25; 75 MG/1; MG/1
100 CAPSULE ORAL 2 TIMES DAILY
Qty: 14 CAP | Refills: 0 | Status: SHIPPED | OUTPATIENT
Start: 2019-05-16 | End: 2019-05-23

## 2019-05-16 RX ORDER — PHENAZOPYRIDINE HYDROCHLORIDE 200 MG/1
200 TABLET, FILM COATED ORAL 3 TIMES DAILY
Qty: 6 TAB | Refills: 0 | Status: SHIPPED | OUTPATIENT
Start: 2019-05-16 | End: 2019-05-18

## 2019-05-16 RX ORDER — NITROFURANTOIN MACROCRYSTALS 50 MG/1
100 CAPSULE ORAL ONCE
Status: COMPLETED | OUTPATIENT
Start: 2019-05-16 | End: 2019-05-16

## 2019-05-16 RX ADMIN — SODIUM CHLORIDE 1000 ML: 900 INJECTION, SOLUTION INTRAVENOUS at 14:29

## 2019-05-16 RX ADMIN — NITROFURANTOIN MACROCRYSTALS 100 MG: 50 CAPSULE ORAL at 15:25

## 2019-05-16 NOTE — DISCHARGE INSTRUCTIONS
Patient Education      Please return immediately to the Emergency Room for re-evaluation if you are not improving, develop any new symptoms, or develop worsening of current symptoms! If you have been prescribed a medication and are unable to take this medication for any reason, please return to the Emergency Department for further evaluation! If you have been referred for follow-up to a specialist, but are unable to follow-up and your symptoms are either not improving or are worsening, please return to the Emergency Department for further evaluation! Urinary Tract Infection in Women: Care Instructions  Your Care Instructions    A urinary tract infection, or UTI, is a general term for an infection anywhere between the kidneys and the urethra (where urine comes out). Most UTIs are bladder infections. They often cause pain or burning when you urinate. UTIs are caused by bacteria and can be cured with antibiotics. Be sure to complete your treatment so that the infection goes away. Follow-up care is a key part of your treatment and safety. Be sure to make and go to all appointments, and call your doctor if you are having problems. It's also a good idea to know your test results and keep a list of the medicines you take. How can you care for yourself at home? · Take your antibiotics as directed. Do not stop taking them just because you feel better. You need to take the full course of antibiotics. · Drink extra water and other fluids for the next day or two. This may help wash out the bacteria that are causing the infection. (If you have kidney, heart, or liver disease and have to limit fluids, talk with your doctor before you increase your fluid intake.)  · Avoid drinks that are carbonated or have caffeine. They can irritate the bladder. · Urinate often. Try to empty your bladder each time. · To relieve pain, take a hot bath or lay a heating pad set on low over your lower belly or genital area.  Never go to sleep with a heating pad in place. To prevent UTIs  · Drink plenty of water each day. This helps you urinate often, which clears bacteria from your system. (If you have kidney, heart, or liver disease and have to limit fluids, talk with your doctor before you increase your fluid intake.)  · Urinate when you need to. · Urinate right after you have sex. · Change sanitary pads often. · Avoid douches, bubble baths, feminine hygiene sprays, and other feminine hygiene products that have deodorants. · After going to the bathroom, wipe from front to back. When should you call for help? Call your doctor now or seek immediate medical care if:    · Symptoms such as fever, chills, nausea, or vomiting get worse or appear for the first time.     · You have new pain in your back just below your rib cage. This is called flank pain.     · There is new blood or pus in your urine.     · You have any problems with your antibiotic medicine.    Watch closely for changes in your health, and be sure to contact your doctor if:    · You are not getting better after taking an antibiotic for 2 days.     · Your symptoms go away but then come back. Where can you learn more? Go to http://erica-venice.info/. Enter H754 in the search box to learn more about \"Urinary Tract Infection in Women: Care Instructions. \"  Current as of: March 20, 2018  Content Version: 11.9  © 4888-0800 [employer]. Care instructions adapted under license by [employer] (which disclaims liability or warranty for this information). If you have questions about a medical condition or this instruction, always ask your healthcare professional. [address] any warranty or liability for your use of this information.

## 2019-05-16 NOTE — ED TRIAGE NOTES
C/O bilateral flank pain, pain with urination and abdominal pain x 1 week. Pt states that she is currently on her menstrual cycle.

## 2019-05-16 NOTE — ED PROVIDER NOTES
EMERGENCY DEPARTMENT HISTORY AND PHYSICAL EXAM    2:48 PM      Date: 5/16/2019  Patient Name: Mariana Solomon    History of Presenting Illness     Chief Complaint   Patient presents with    Urinary Pain    Flank Pain         History Provided By: Patient    Chief Complaint: dysuria, bilateral flank pain  Duration: 1 Weeks  Timing:  Acute  Location:   Quality: Aching  Severity: Mild  Modifying Factors: none  Associated Symptoms: denies any other associated signs or symptoms      Additional History (Context):Tara Salas Guardian is a 46 y.o. female who presents to the emergency department for evaluation of dysuria, urinary frequency, and bilateral flank pain x 1 week. No urinary odor or blood. Patient reports she has been taking her normal medications for her chronic pain, but this is not helped with her symptoms. No recent fevers, chills, nausea, vomiting, diarrhea, chest pain, shortness of breath, abdominal pain, or any other complaints. Patient is on her menses currently and states that this may be exacerbating her urinary pain. No history of kidney stones. PCP:  Jannet Wang MD        Current Facility-Administered Medications   Medication Dose Route Frequency Provider Last Rate Last Dose    sodium chloride 0.9 % bolus infusion 1,000 mL  1,000 mL IntraVENous ONCE Kye Lewis PA-C 1,000 mL/hr at 05/16/19 1429 1,000 mL at 05/16/19 1429    nitrofurantoin (MACRODANTIN) capsule 100 mg  100 mg Oral ONCE Kye Lewis PA-C         Current Outpatient Medications   Medication Sig Dispense Refill    nitrofurantoin, macrocrystal-monohydrate, (MACROBID) 100 mg capsule Take 1 Cap by mouth two (2) times a day for 7 days. 14 Cap 0    phenazopyridine (PYRIDIUM) 200 mg tablet Take 1 Tab by mouth three (3) times daily for 2 days. 6 Tab 0    amphetamine-dextroamphetamine XR (ADDERALL XR) 25 mg XR capsule Take 1 Cap by mouth every morning.  Max Daily Amount: 25 mg. 30 Cap 0    cetirizine (ZYRTEC) 10 mg tablet Take by mouth.  ergocalciferol (ERGOCALCIFEROL) 50,000 unit capsule Take 1 Cap by mouth every seven (7) days. 12 Cap 1    montelukast (SINGULAIR) 10 mg tablet TAKE ONE TABLET BY MOUTH EVERY EVENING 30 Tab 5    triamcinolone (NASACORT AQ) 55 mcg nasal inhaler USE 2 SPRAYS IN EACH NOSTRIL 2 TIMES A DAY 1 Bottle 2       Past History     Past Medical History:  Past Medical History:   Diagnosis Date    ADD (attention deficit disorder)     Allergic rhinitis     Chronic low back pain     Palpitations     Positive ALIS (antinuclear antibody) 05/2017    PVC's (premature ventricular contractions)     Raynaud's phenomenon        Past Surgical History:  History reviewed. No pertinent surgical history. Family History:  Family History   Problem Relation Age of Onset    Hypertension Mother        Social History:  Social History     Tobacco Use    Smoking status: Never Smoker    Smokeless tobacco: Never Used   Substance Use Topics    Alcohol use: Yes     Alcohol/week: 0.6 oz     Types: 1 Shots of liquor per week     Comment: 2 monthly    Drug use: No       Allergies: Allergies   Allergen Reactions    Cephalosporins Shortness of Breath and Rash         Review of Systems       Review of Systems   Constitutional: Negative for chills and fever. HENT: Negative for congestion, rhinorrhea and sore throat. Respiratory: Negative for cough and shortness of breath. Cardiovascular: Negative for chest pain. Gastrointestinal: Negative for abdominal pain, blood in stool, constipation, diarrhea, nausea and vomiting. Genitourinary: Positive for dysuria, flank pain and frequency. Negative for hematuria. Musculoskeletal: Negative for back pain and myalgias. Skin: Negative for rash and wound. Neurological: Negative for dizziness and headaches. All other systems reviewed and are negative.         Physical Exam     Visit Vitals  /80 (BP 1 Location: Left arm, BP Patient Position: Sitting)   Pulse 68   Temp 98.9 °F (37.2 °C)   Resp 14   Ht 5' 9\" (1.753 m)   Wt 65.8 kg (145 lb)   LMP 05/13/2019   SpO2 100%   BMI 21.41 kg/m²       Physical Exam   Constitutional: She is oriented to person, place, and time. She appears well-developed and well-nourished. No distress. HENT:   Head: Normocephalic and atraumatic. Eyes: Conjunctivae are normal.   Neck: Normal range of motion. Neck supple. Cardiovascular: Normal rate, regular rhythm and normal heart sounds. Pulmonary/Chest: Effort normal and breath sounds normal. No respiratory distress. She has no wheezes. She has no rales. She exhibits no tenderness. Abdominal: Soft. Bowel sounds are normal. She exhibits no distension. There is no tenderness. There is no rebound and no guarding. Musculoskeletal: She exhibits no edema, tenderness or deformity. Neurological: She is alert and oriented to person, place, and time. She has normal reflexes. Skin: Skin is warm and dry. She is not diaphoretic. Psychiatric: She has a normal mood and affect. Nursing note and vitals reviewed. Diagnostic Study Results     Labs -  Recent Results (from the past 12 hour(s))   HCG URINE, QL    Collection Time: 05/16/19  1:48 PM   Result Value Ref Range    HCG urine, QL NEGATIVE  NEG     CBC WITH AUTOMATED DIFF    Collection Time: 05/16/19  2:20 PM   Result Value Ref Range    WBC 7.1 4.6 - 13.2 K/uL    RBC 4.85 4.20 - 5.30 M/uL    HGB 12.7 12.0 - 16.0 g/dL    HCT 39.7 35.0 - 45.0 %    MCV 81.9 74.0 - 97.0 FL    MCH 26.2 24.0 - 34.0 PG    MCHC 32.0 31.0 - 37.0 g/dL    RDW 14.5 11.6 - 14.5 %    PLATELET 034 255 - 479 K/uL    MPV 10.5 9.2 - 11.8 FL    NEUTROPHILS 76 (H) 40 - 73 %    LYMPHOCYTES 15 (L) 21 - 52 %    MONOCYTES 7 3 - 10 %    EOSINOPHILS 2 0 - 5 %    BASOPHILS 0 0 - 2 %    ABS. NEUTROPHILS 5.4 1.8 - 8.0 K/UL    ABS. LYMPHOCYTES 1.0 0.9 - 3.6 K/UL    ABS. MONOCYTES 0.5 0.05 - 1.2 K/UL    ABS. EOSINOPHILS 0.2 0.0 - 0.4 K/UL    ABS.  BASOPHILS 0.0 0.0 - 0.1 K/UL    DF AUTOMATED METABOLIC PANEL, COMPREHENSIVE    Collection Time: 05/16/19  2:20 PM   Result Value Ref Range    Sodium 140 136 - 145 mmol/L    Potassium 4.4 3.5 - 5.5 mmol/L    Chloride 104 100 - 108 mmol/L    CO2 31 21 - 32 mmol/L    Anion gap 5 3.0 - 18 mmol/L    Glucose 79 74 - 99 mg/dL    BUN 13 7.0 - 18 MG/DL    Creatinine 0.81 0.6 - 1.3 MG/DL    BUN/Creatinine ratio 16 12 - 20      GFR est AA >60 >60 ml/min/1.73m2    GFR est non-AA >60 >60 ml/min/1.73m2    Calcium 9.1 8.5 - 10.1 MG/DL    Bilirubin, total 0.5 0.2 - 1.0 MG/DL    ALT (SGPT) 16 13 - 56 U/L    AST (SGOT) 15 15 - 37 U/L    Alk. phosphatase 75 45 - 117 U/L    Protein, total 7.5 6.4 - 8.2 g/dL    Albumin 4.0 3.4 - 5.0 g/dL    Globulin 3.5 2.0 - 4.0 g/dL    A-G Ratio 1.1 0.8 - 1.7     URINALYSIS W/ RFLX MICROSCOPIC    Collection Time: 05/16/19  2:45 PM   Result Value Ref Range    Color YELLOW      Appearance CLEAR      Specific gravity 1.005 1.005 - 1.030      pH (UA) 5.5 5.0 - 8.0      Protein NEGATIVE  NEG mg/dL    Glucose NEGATIVE  NEG mg/dL    Ketone NEGATIVE  NEG mg/dL    Bilirubin NEGATIVE  NEG      Blood LARGE (A) NEG      Urobilinogen 0.2 0.2 - 1.0 EU/dL    Nitrites NEGATIVE  NEG      Leukocyte Esterase MODERATE (A) NEG     URINE MICROSCOPIC ONLY    Collection Time: 05/16/19  2:45 PM   Result Value Ref Range    WBC 11 to 20 0 - 4 /hpf    RBC 21 to 35 0 - 5 /hpf    Bacteria 1+ (A) NEG /hpf       Radiologic Studies -   No results found. Medical Decision Making   I am the first provider for this patient. I reviewed the vital signs, available nursing notes, past medical history, past surgical history, family history and social history. Vital Signs-Reviewed the patient's vital signs.     Pulse Oximetry Analysis -  100% on room air (Interpretation)    Records Reviewed: Nursing Notes and Old Medical Records (Time of Review: 2:48 PM)    ED Course: Progress Notes, Reevaluation, and Consults:      Provider Notes (Medical Decision Making):   differential diagnosis: UTI, Pyelonephritis, musculoskeletal etiology, nephrolithiasis    Plan: Patient presents ambulatory in no acute distress with normal vitals. Exam and HPI are consistent with uncomplicated UTI. Urinalysis confirms infection. CBC and CMP are unremarkable. hCG is negative. Will discharge home with Macrobid. First dose along with IV fluids given here. .    At this time, patient is stable and appropriate for discharge home. Patient demonstrates understanding of current diagnoses and is in agreement with the treatment plan. They are advised that while the likelihood of serious underlying condition is low at this point given the evaluation performed today, we cannot fully rule it out. They are advised to immediately return with any new symptoms or worsening of current condition. All questions have been answered. Patient is given educational material regarding their diagnoses, including danger symptoms and when to return to the ED. Diagnosis     Clinical Impression:   1. Acute cystitis with hematuria        Disposition: [place] Home    Follow-up Information     Follow up With Specialties Details Why Contact Info    Vandana Evans MD Internal Medicine Call in 2 days As needed [facility] [address] [facility] [address] [phone] [address] [facility] EMERGENCY DEPT Emergency Medicine Go to If symptoms worsen, As needed [address] [facility]  [phone]           Patient's Medications   Start Taking    NITROFURANTOIN, MACROCRYSTAL-MONOHYDRATE, (MACROBID) 100 MG CAPSULE    Take 1 Cap by mouth two (2) times a day for 7 days. PHENAZOPYRIDINE (PYRIDIUM) 200 MG TABLET    Take 1 Tab by mouth three (3) times daily for 2 days. Continue Taking    AMPHETAMINE-DEXTROAMPHETAMINE XR (ADDERALL XR) 25 MG XR CAPSULE    Take 1 Cap by mouth every morning. Max Daily Amount: 25 mg. CETIRIZINE (ZYRTEC) 10 MG TABLET    Take  by mouth.     ERGOCALCIFEROL (ERGOCALCIFEROL) 50,000 UNIT CAPSULE    Take 1 Cap by mouth every seven (7) days.     MONTELUKAST (SINGULAIR) 10 MG TABLET    TAKE ONE TABLET BY MOUTH EVERY EVENING    TRIAMCINOLONE (NASACORT AQ) 55 MCG NASAL INHALER    USE 2 SPRAYS IN EACH NOSTRIL 2 TIMES A DAY   These Medications have changed    No medications on file   Stop Taking    NAPROXEN (NAPROSYN) 375 MG TABLET    TAKE ONE TABLET BY MOUTH TWICE DAILY WITH MEALS    ONDANSETRON (ZOFRAN ODT) 4 MG DISINTEGRATING TABLET    Take 1 Tab by mouth every eight (8) hours as needed for Nausea.     _______________________________

## 2019-05-29 DIAGNOSIS — F98.8 ATTENTION DEFICIT DISORDER, UNSPECIFIED HYPERACTIVITY PRESENCE: ICD-10-CM

## 2019-05-29 DIAGNOSIS — Z79.899 CONTROLLED SUBSTANCE AGREEMENT SIGNED: Primary | ICD-10-CM

## 2019-05-30 DIAGNOSIS — Z79.899 CONTROLLED SUBSTANCE AGREEMENT SIGNED: ICD-10-CM

## 2019-05-30 RX ORDER — DEXTROAMPHETAMINE SACCHARATE, AMPHETAMINE ASPARTATE MONOHYDRATE, DEXTROAMPHETAMINE SULFATE AND AMPHETAMINE SULFATE 6.25; 6.25; 6.25; 6.25 MG/1; MG/1; MG/1; MG/1
25 CAPSULE, EXTENDED RELEASE ORAL
Qty: 30 CAP | Refills: 0 | Status: SHIPPED | OUTPATIENT
Start: 2019-05-30 | End: 2019-07-03 | Stop reason: SDUPTHER

## 2019-05-30 NOTE — TELEPHONE ENCOUNTER
Controlled Substance agreement and UDS needed. Will leave with RX at  for patient to complete. UDS ordered.

## 2019-05-30 NOTE — TELEPHONE ENCOUNTER
Reviewed report generated by the [facility]. Does not demonstrate aberrancies or inconsistencies with regard to the prescribing of controlled medications to this patient by other providers. Last filled Adderall 4/29/2019 per . Please make sure contract and urine drug screen up to date.

## 2019-05-31 ENCOUNTER — APPOINTMENT (OUTPATIENT)
Dept: INTERNAL MEDICINE CLINIC | Age: 39
End: 2019-05-31

## 2019-05-31 DIAGNOSIS — M25.50 ARTHRALGIA, UNSPECIFIED JOINT: ICD-10-CM

## 2019-05-31 DIAGNOSIS — E55.9 VITAMIN D DEFICIENCY: ICD-10-CM

## 2019-06-03 ENCOUNTER — TELEPHONE (OUTPATIENT)
Dept: INTERNAL MEDICINE CLINIC | Age: 39
End: 2019-06-03

## 2019-06-03 LAB
25(OH)D3+25(OH)D2 SERPL-MCNC: 45.8 NG/ML (ref 30–100)
AMPHETAMINES UR QL: POSITIVE
BARBITURATES UR QL SCN: NEGATIVE NG/ML
BENZODIAZ UR QL SCN: NEGATIVE NG/ML
BZE UR QL SCN: NEGATIVE NG/ML
CANNABINOIDS UR QL SCN: NEGATIVE NG/ML
CREAT UR-MCNC: 320 MG/DL (ref 20–300)
FENTANYL+NORFENTANYL UR QL SCN: NEGATIVE PG/ML
MEPERIDINE UR QL: NEGATIVE NG/ML
METHADONE UR QL SCN: NEGATIVE NG/ML
OPIATES UR QL SCN: NEGATIVE NG/ML
OXYCODONE+OXYMORPHONE UR QL SCN: NEGATIVE NG/ML
PCP UR QL: NEGATIVE NG/ML
PH UR: 5.7 [PH] (ref 4.5–8.9)
PLEASE NOTE:, 733157: ABNORMAL
PROPOXYPH UR QL SCN: NEGATIVE NG/ML
SP GR UR: 1.02
TRAMADOL UR QL SCN: NEGATIVE NG/ML

## 2019-06-04 NOTE — TELEPHONE ENCOUNTER
Please let the patient know that her Vitamin D level is now in the normal range at 45.8. She should continue to take Vitamin D3 800 U daily to maintain her current level given severe deficiency previously.

## 2019-06-07 NOTE — TELEPHONE ENCOUNTER
Called patient and verified full name and date of birth. Informed patient of Dr. Mushtaq Campuzano message/ orders and patient verbalized understanding.

## 2019-07-03 DIAGNOSIS — F98.8 ATTENTION DEFICIT DISORDER, UNSPECIFIED HYPERACTIVITY PRESENCE: ICD-10-CM

## 2019-07-03 RX ORDER — DEXTROAMPHETAMINE SACCHARATE, AMPHETAMINE ASPARTATE MONOHYDRATE, DEXTROAMPHETAMINE SULFATE AND AMPHETAMINE SULFATE 6.25; 6.25; 6.25; 6.25 MG/1; MG/1; MG/1; MG/1
25 CAPSULE, EXTENDED RELEASE ORAL
Qty: 30 CAP | Refills: 0 | Status: SHIPPED | OUTPATIENT
Start: 2019-07-03 | End: 2019-08-09 | Stop reason: SDUPTHER

## 2019-07-03 NOTE — TELEPHONE ENCOUNTER
PCP: Nicola Street MD    Last appt: 5/31/2019  Future Appointments   Date Time Provider Josefa Valladares   2/25/2020  8:45 AM [facility] NURSE VISIT [facility] XAVI CANTU       Requested Prescriptions     Pending Prescriptions Disp Refills    amphetamine-dextroamphetamine XR (ADDERALL XR) 25 mg XR capsule 30 Cap 0     Sig: Take 1 Cap by mouth every morning.  Max Daily Amount: 25 mg.        reviewed last fill was 6-1-19

## 2019-08-09 DIAGNOSIS — F98.8 ATTENTION DEFICIT DISORDER, UNSPECIFIED HYPERACTIVITY PRESENCE: ICD-10-CM

## 2019-08-09 NOTE — TELEPHONE ENCOUNTER
LAST OV: 2/26/19  LAST FILL per : 7/5/19  Reviewed report generated by the [place] Prescription Monitoring Program. Does not demonstrate aberrancies or inconsistencies with regard to the prescribing of controlled medications to this patient by other providers.     Controlled Substance Agreement on file  Last UDS 5/31/19

## 2019-08-13 RX ORDER — DEXTROAMPHETAMINE SACCHARATE, AMPHETAMINE ASPARTATE MONOHYDRATE, DEXTROAMPHETAMINE SULFATE AND AMPHETAMINE SULFATE 6.25; 6.25; 6.25; 6.25 MG/1; MG/1; MG/1; MG/1
25 CAPSULE, EXTENDED RELEASE ORAL
Qty: 30 CAP | Refills: 0 | Status: SHIPPED | OUTPATIENT
Start: 2019-08-13 | End: 2019-09-24 | Stop reason: SDUPTHER

## 2019-09-16 RX ORDER — MONTELUKAST SODIUM 10 MG/1
10 TABLET ORAL DAILY
Qty: 90 TAB | Refills: 2 | Status: SHIPPED | OUTPATIENT
Start: 2019-09-16 | End: 2020-07-24 | Stop reason: SDUPTHER

## 2019-09-16 NOTE — TELEPHONE ENCOUNTER
PCP: Zuly Noe MD    Last appt: 5/31/2019  Future Appointments   Date Time Provider Josefa Mckeoni   2/25/2020  8:45 AM [facility] NURSE VISIT [facility] XAVI CANTU       Requested Prescriptions     Pending Prescriptions Disp Refills    montelukast (SINGULAIR) 10 mg tablet 30 Tab 5

## 2019-09-24 DIAGNOSIS — F98.8 ATTENTION DEFICIT DISORDER, UNSPECIFIED HYPERACTIVITY PRESENCE: ICD-10-CM

## 2019-09-25 RX ORDER — DEXTROAMPHETAMINE SACCHARATE, AMPHETAMINE ASPARTATE MONOHYDRATE, DEXTROAMPHETAMINE SULFATE AND AMPHETAMINE SULFATE 6.25; 6.25; 6.25; 6.25 MG/1; MG/1; MG/1; MG/1
25 CAPSULE, EXTENDED RELEASE ORAL
Qty: 30 CAP | Refills: 0 | Status: SHIPPED | OUTPATIENT
Start: 2019-09-29 | End: 2019-10-25 | Stop reason: SDUPTHER

## 2019-10-25 DIAGNOSIS — F98.8 ATTENTION DEFICIT DISORDER, UNSPECIFIED HYPERACTIVITY PRESENCE: ICD-10-CM

## 2019-10-25 RX ORDER — DEXTROAMPHETAMINE SACCHARATE, AMPHETAMINE ASPARTATE MONOHYDRATE, DEXTROAMPHETAMINE SULFATE AND AMPHETAMINE SULFATE 6.25; 6.25; 6.25; 6.25 MG/1; MG/1; MG/1; MG/1
25 CAPSULE, EXTENDED RELEASE ORAL
Qty: 30 CAP | Refills: 0 | Status: SHIPPED | OUTPATIENT
Start: 2019-10-25 | End: 2019-11-26 | Stop reason: SDUPTHER

## 2019-10-25 NOTE — TELEPHONE ENCOUNTER
Reviewed report generated by the [facility]. Does not demonstrate aberrancies or inconsistencies with regard to the prescribing of controlled medications to this patient by other providers. Last filled 9/29/2019 per .

## 2019-10-25 NOTE — TELEPHONE ENCOUNTER
Last OV: 02/26/2019  Last Fill: 09/29/2019  Controlled substance on file: 08/09/2019  Last UDS: 05/31/2019

## 2019-11-26 DIAGNOSIS — F98.8 ATTENTION DEFICIT DISORDER, UNSPECIFIED HYPERACTIVITY PRESENCE: ICD-10-CM

## 2019-11-26 RX ORDER — DEXTROAMPHETAMINE SACCHARATE, AMPHETAMINE ASPARTATE MONOHYDRATE, DEXTROAMPHETAMINE SULFATE AND AMPHETAMINE SULFATE 6.25; 6.25; 6.25; 6.25 MG/1; MG/1; MG/1; MG/1
25 CAPSULE, EXTENDED RELEASE ORAL
Qty: 30 CAP | Refills: 0 | Status: SHIPPED | OUTPATIENT
Start: 2019-11-30 | End: 2020-01-07 | Stop reason: SDUPTHER

## 2020-01-07 DIAGNOSIS — F98.8 ATTENTION DEFICIT DISORDER, UNSPECIFIED HYPERACTIVITY PRESENCE: ICD-10-CM

## 2020-01-09 RX ORDER — DEXTROAMPHETAMINE SACCHARATE, AMPHETAMINE ASPARTATE MONOHYDRATE, DEXTROAMPHETAMINE SULFATE AND AMPHETAMINE SULFATE 6.25; 6.25; 6.25; 6.25 MG/1; MG/1; MG/1; MG/1
25 CAPSULE, EXTENDED RELEASE ORAL
Qty: 30 CAP | Refills: 0 | Status: SHIPPED | OUTPATIENT
Start: 2020-01-09 | End: 2020-02-16 | Stop reason: SDUPTHER

## 2020-01-09 NOTE — TELEPHONE ENCOUNTER
[facility] date for Adderall XR as 11/30/19 for a 30 d/s. UDS done 5/31/19  CSA signed 5/31/19    Requested Prescriptions     Pending Prescriptions Disp Refills    amphetamine-dextroamphetamine XR (ADDERALL XR) 25 mg XR capsule 30 Cap 0     Sig: Take 1 Cap by mouth every morning. Max Daily Amount: 25 mg.

## 2020-01-20 ENCOUNTER — DOCUMENTATION ONLY (OUTPATIENT)
Dept: INTERNAL MEDICINE CLINIC | Age: 40
End: 2020-01-20

## 2020-02-16 DIAGNOSIS — F98.8 ATTENTION DEFICIT DISORDER, UNSPECIFIED HYPERACTIVITY PRESENCE: ICD-10-CM

## 2020-02-18 ENCOUNTER — LAB ONLY (OUTPATIENT)
Dept: INTERNAL MEDICINE CLINIC | Age: 40
End: 2020-02-18

## 2020-02-18 DIAGNOSIS — Z79.899 MEDICATION MANAGEMENT: Primary | ICD-10-CM

## 2020-02-18 DIAGNOSIS — Z79.899 MEDICATION MANAGEMENT: ICD-10-CM

## 2020-02-18 RX ORDER — DEXTROAMPHETAMINE SACCHARATE, AMPHETAMINE ASPARTATE MONOHYDRATE, DEXTROAMPHETAMINE SULFATE AND AMPHETAMINE SULFATE 6.25; 6.25; 6.25; 6.25 MG/1; MG/1; MG/1; MG/1
25 CAPSULE, EXTENDED RELEASE ORAL
Qty: 30 CAP | Refills: 0 | Status: SHIPPED | OUTPATIENT
Start: 2020-02-18 | End: 2020-02-18 | Stop reason: SDUPTHER

## 2020-02-18 RX ORDER — DEXTROAMPHETAMINE SACCHARATE, AMPHETAMINE ASPARTATE MONOHYDRATE, DEXTROAMPHETAMINE SULFATE AND AMPHETAMINE SULFATE 6.25; 6.25; 6.25; 6.25 MG/1; MG/1; MG/1; MG/1
25 CAPSULE, EXTENDED RELEASE ORAL
Qty: 30 CAP | Refills: 0 | Status: SHIPPED | OUTPATIENT
Start: 2020-02-18 | End: 2020-03-17 | Stop reason: SDUPTHER

## 2020-02-20 LAB
AMPHETAMINE, 737686: POSITIVE NG/ML
BARBITURATES: NEGATIVE NG/ML
BENZODIAZEPINES, R9BE1T: NEGATIVE NG/ML
BUPRENORPHINE, URINE: NEGATIVE NG/ML
CANNABINOID, DR86L: NEGATIVE NG/ML
COCAINE/METABOLITES, MDS2T: NEGATIVE NG/ML
CREATININE URINE,3297223: 354.2 MG/DL (ref 20–300)
METHADONE, 791633: NEGATIVE NG/ML
OPIATE, DR88L: NEGATIVE NG/ML
OXYCODONE/OXYMORPHONE-PM: NEGATIVE NG/ML
PH UR STRIP: 6.1 [PH] (ref 4.5–8.9)
PHENCYCLIDINE: NEGATIVE NG/ML
PLEASE NOTE, 62788: ABNORMAL
PROPOXYPHENE, 791635: NEGATIVE NG/ML

## 2020-02-25 ENCOUNTER — APPOINTMENT (OUTPATIENT)
Dept: INTERNAL MEDICINE CLINIC | Age: 40
End: 2020-02-25

## 2020-02-25 DIAGNOSIS — Z01.419 ENCOUNTER FOR WELL WOMAN EXAM WITH ROUTINE GYNECOLOGICAL EXAM: ICD-10-CM

## 2020-02-25 DIAGNOSIS — M54.41 CHRONIC LOW BACK PAIN WITH BILATERAL SCIATICA, UNSPECIFIED BACK PAIN LATERALITY: ICD-10-CM

## 2020-02-25 DIAGNOSIS — G89.29 CHRONIC LOW BACK PAIN WITH BILATERAL SCIATICA, UNSPECIFIED BACK PAIN LATERALITY: ICD-10-CM

## 2020-02-25 DIAGNOSIS — F98.8 ATTENTION DEFICIT DISORDER, UNSPECIFIED HYPERACTIVITY PRESENCE: ICD-10-CM

## 2020-02-25 DIAGNOSIS — E55.9 VITAMIN D DEFICIENCY: ICD-10-CM

## 2020-02-25 DIAGNOSIS — Z00.01 ENCOUNTER FOR ROUTINE ADULT PHYSICAL EXAM WITH ABNORMAL FINDINGS: ICD-10-CM

## 2020-02-25 DIAGNOSIS — R76.8 POSITIVE ANA (ANTINUCLEAR ANTIBODY): ICD-10-CM

## 2020-02-25 DIAGNOSIS — Z12.31 SCREENING MAMMOGRAM, ENCOUNTER FOR: ICD-10-CM

## 2020-02-25 DIAGNOSIS — I73.00 RAYNAUD'S PHENOMENON WITHOUT GANGRENE: ICD-10-CM

## 2020-02-25 DIAGNOSIS — M54.42 CHRONIC LOW BACK PAIN WITH BILATERAL SCIATICA, UNSPECIFIED BACK PAIN LATERALITY: ICD-10-CM

## 2020-02-25 DIAGNOSIS — J30.2 SEASONAL ALLERGIC RHINITIS, UNSPECIFIED TRIGGER: ICD-10-CM

## 2020-02-25 DIAGNOSIS — E78.5 DYSLIPIDEMIA: ICD-10-CM

## 2020-02-25 DIAGNOSIS — M25.50 ARTHRALGIA, UNSPECIFIED JOINT: ICD-10-CM

## 2020-02-26 LAB
25(OH)D3+25(OH)D2 SERPL-MCNC: 23.6 NG/ML (ref 30–100)
ALBUMIN SERPL-MCNC: 4.3 G/DL (ref 3.8–4.8)
ALBUMIN/GLOB SERPL: 1.7 {RATIO} (ref 1.2–2.2)
ALP SERPL-CCNC: 62 IU/L (ref 39–117)
ALT SERPL-CCNC: 6 IU/L (ref 0–32)
APPEARANCE UR: CLEAR
AST SERPL-CCNC: 13 IU/L (ref 0–40)
BACTERIA #/AREA URNS HPF: ABNORMAL /[HPF]
BASOPHILS # BLD AUTO: 0 X10E3/UL (ref 0–0.2)
BASOPHILS NFR BLD AUTO: 1 %
BILIRUB SERPL-MCNC: 0.3 MG/DL (ref 0–1.2)
BILIRUB UR QL STRIP: NEGATIVE
BUN SERPL-MCNC: 12 MG/DL (ref 6–20)
BUN/CREAT SERPL: 16 (ref 9–23)
CALCIUM SERPL-MCNC: 9.2 MG/DL (ref 8.7–10.2)
CASTS URNS QL MICRO: ABNORMAL /LPF
CHLORIDE SERPL-SCNC: 102 MMOL/L (ref 96–106)
CHOLEST SERPL-MCNC: 162 MG/DL (ref 100–199)
CO2 SERPL-SCNC: 22 MMOL/L (ref 20–29)
COLOR UR: YELLOW
CREAT SERPL-MCNC: 0.74 MG/DL (ref 0.57–1)
CRYSTALS URNS MICRO: ABNORMAL
EOSINOPHIL # BLD AUTO: 0.2 X10E3/UL (ref 0–0.4)
EOSINOPHIL NFR BLD AUTO: 2 %
EPI CELLS #/AREA URNS HPF: ABNORMAL /HPF (ref 0–10)
ERYTHROCYTE [DISTWIDTH] IN BLOOD BY AUTOMATED COUNT: 14.4 % (ref 11.7–15.4)
GLOBULIN SER CALC-MCNC: 2.6 G/DL (ref 1.5–4.5)
GLUCOSE SERPL-MCNC: 84 MG/DL (ref 65–99)
GLUCOSE UR QL: NEGATIVE
HCT VFR BLD AUTO: 41.3 % (ref 34–46.6)
HDLC SERPL-MCNC: 51 MG/DL
HGB BLD-MCNC: 12.9 G/DL (ref 11.1–15.9)
HGB UR QL STRIP: NEGATIVE
IMM GRANULOCYTES # BLD AUTO: 0 X10E3/UL (ref 0–0.1)
IMM GRANULOCYTES NFR BLD AUTO: 0 %
INTERPRETATION, 910389: NORMAL
KETONES UR QL STRIP: NEGATIVE
LDLC SERPL CALC-MCNC: 94 MG/DL (ref 0–99)
LEUKOCYTE ESTERASE UR QL STRIP: NEGATIVE
LYMPHOCYTES # BLD AUTO: 1.8 X10E3/UL (ref 0.7–3.1)
LYMPHOCYTES NFR BLD AUTO: 24 %
MCH RBC QN AUTO: 25.9 PG (ref 26.6–33)
MCHC RBC AUTO-ENTMCNC: 31.2 G/DL (ref 31.5–35.7)
MCV RBC AUTO: 83 FL (ref 79–97)
MICRO URNS: NORMAL
MICRO URNS: NORMAL
MONOCYTES # BLD AUTO: 0.4 X10E3/UL (ref 0.1–0.9)
MONOCYTES NFR BLD AUTO: 6 %
MUCOUS THREADS URNS QL MICRO: PRESENT
NEUTROPHILS # BLD AUTO: 4.9 X10E3/UL (ref 1.4–7)
NEUTROPHILS NFR BLD AUTO: 67 %
NITRITE UR QL STRIP: NEGATIVE
PH UR STRIP: 5.5 [PH] (ref 5–7.5)
PLATELET # BLD AUTO: 260 X10E3/UL (ref 150–450)
POTASSIUM SERPL-SCNC: 4.2 MMOL/L (ref 3.5–5.2)
PROT SERPL-MCNC: 6.9 G/DL (ref 6–8.5)
PROT UR QL STRIP: NEGATIVE
RBC # BLD AUTO: 4.98 X10E6/UL (ref 3.77–5.28)
RBC #/AREA URNS HPF: ABNORMAL /HPF (ref 0–2)
SODIUM SERPL-SCNC: 141 MMOL/L (ref 134–144)
SP GR UR: 1.03 (ref 1–1.03)
TRIGL SERPL-MCNC: 86 MG/DL (ref 0–149)
TSH SERPL DL<=0.005 MIU/L-ACNC: 3.41 UIU/ML (ref 0.45–4.5)
UNIDENT CRYS URNS QL MICRO: PRESENT
UROBILINOGEN UR STRIP-MCNC: 0.2 MG/DL (ref 0.2–1)
VLDLC SERPL CALC-MCNC: 17 MG/DL (ref 5–40)
WBC # BLD AUTO: 7.3 X10E3/UL (ref 3.4–10.8)
WBC #/AREA URNS HPF: ABNORMAL /HPF (ref 0–5)

## 2020-03-19 ENCOUNTER — OFFICE VISIT (OUTPATIENT)
Dept: INTERNAL MEDICINE CLINIC | Age: 40
End: 2020-03-19

## 2020-03-19 VITALS
SYSTOLIC BLOOD PRESSURE: 110 MMHG | BODY MASS INDEX: 22.66 KG/M2 | WEIGHT: 153 LBS | RESPIRATION RATE: 14 BRPM | HEIGHT: 69 IN | OXYGEN SATURATION: 98 % | DIASTOLIC BLOOD PRESSURE: 68 MMHG | TEMPERATURE: 99.4 F | HEART RATE: 94 BPM

## 2020-03-19 DIAGNOSIS — F98.8 ATTENTION DEFICIT DISORDER, UNSPECIFIED HYPERACTIVITY PRESENCE: ICD-10-CM

## 2020-03-19 DIAGNOSIS — J30.2 SEASONAL ALLERGIC RHINITIS, UNSPECIFIED TRIGGER: ICD-10-CM

## 2020-03-19 DIAGNOSIS — Z12.31 SCREENING MAMMOGRAM, ENCOUNTER FOR: ICD-10-CM

## 2020-03-19 DIAGNOSIS — Z80.3 FH: BREAST CANCER: ICD-10-CM

## 2020-03-19 DIAGNOSIS — E55.9 VITAMIN D DEFICIENCY: ICD-10-CM

## 2020-03-19 DIAGNOSIS — G89.29 CHRONIC LOW BACK PAIN WITH BILATERAL SCIATICA, UNSPECIFIED BACK PAIN LATERALITY: ICD-10-CM

## 2020-03-19 DIAGNOSIS — M54.42 CHRONIC LOW BACK PAIN WITH BILATERAL SCIATICA, UNSPECIFIED BACK PAIN LATERALITY: ICD-10-CM

## 2020-03-19 DIAGNOSIS — R76.8 POSITIVE ANA (ANTINUCLEAR ANTIBODY): ICD-10-CM

## 2020-03-19 DIAGNOSIS — I73.00 RAYNAUD'S PHENOMENON WITHOUT GANGRENE: ICD-10-CM

## 2020-03-19 DIAGNOSIS — M54.41 CHRONIC LOW BACK PAIN WITH BILATERAL SCIATICA, UNSPECIFIED BACK PAIN LATERALITY: ICD-10-CM

## 2020-03-19 DIAGNOSIS — Z00.01 ENCOUNTER FOR ROUTINE ADULT PHYSICAL EXAM WITH ABNORMAL FINDINGS: Primary | ICD-10-CM

## 2020-03-19 RX ORDER — ERGOCALCIFEROL 1.25 MG/1
50000 CAPSULE ORAL
Qty: 12 CAP | Refills: 1 | Status: SHIPPED | OUTPATIENT
Start: 2020-03-19 | End: 2021-05-11 | Stop reason: DRUGHIGH

## 2020-03-19 NOTE — PATIENT INSTRUCTIONS
Please schedule mammogram [phone]          A Healthy Lifestyle: Care Instructions  Your Care Instructions    A healthy lifestyle can help you feel good, stay at a healthy weight, and have plenty of energy for both work and play. A healthy lifestyle is something you can share with your whole family. A healthy lifestyle also can lower your risk for serious health problems, such as high blood pressure, heart disease, and diabetes. You can follow a few steps listed below to improve your health and the health of your family. Follow-up care is a key part of your treatment and safety. Be sure to make and go to all appointments, and call your doctor if you are having problems. It's also a good idea to know your test results and keep a list of the medicines you take. How can you care for yourself at home? · Do not eat too much sugar, fat, or fast foods. You can still have dessert and treats now and then. The goal is moderation. · Start small to improve your eating habits. Pay attention to portion sizes, drink less juice and soda pop, and eat more fruits and vegetables. ? Eat a healthy amount of food. A 3-ounce serving of meat, for example, is about the size of a deck of cards. Fill the rest of your plate with vegetables and whole grains. ? Limit the amount of soda and sports drinks you have every day. Drink more water when you are thirsty. ? Eat at least 5 servings of fruits and vegetables every day. It may seem like a lot, but it is not hard to reach this goal. A serving or helping is 1 piece of fruit, 1 cup of vegetables, or 2 cups of leafy, raw vegetables. Have an apple or some carrot sticks as an afternoon snack instead of a candy bar. Try to have fruits and/or vegetables at every meal.  · Make exercise part of your daily routine. You may want to start with simple activities, such as walking, bicycling, or slow swimming. Try to be active 30 to 60 minutes every day.  You do not need to do all 30 to 60 minutes all at once. For example, you can exercise 3 times a day for 10 or 20 minutes. Moderate exercise is safe for most people, but it is always a good idea to talk to your doctor before starting an exercise program.  · Keep moving. Julianna Krishan the lawn, work in the garden, or [employer]. Take the stairs instead of the elevator at work. · If you smoke, quit. People who smoke have an increased risk for heart attack, stroke, cancer, and other lung illnesses. Quitting is hard, but there are ways to boost your chance of quitting tobacco for good. ? Use nicotine gum, patches, or lozenges. ? Ask your doctor about stop-smoking programs and medicines. ? Keep trying. In addition to reducing your risk of diseases in the future, you will notice some benefits soon after you stop using tobacco. If you have shortness of breath or asthma symptoms, they will likely get better within a few weeks after you quit. · Limit how much alcohol you drink. Moderate amounts of alcohol (up to 2 drinks a day for men, 1 drink a day for women) are okay. But drinking too much can lead to liver problems, high blood pressure, and other health problems. Family health  If you have a family, there are many things you can do together to improve your health. · Eat meals together as a family as often as possible. · Eat healthy foods. This includes fruits, vegetables, lean meats and dairy, and whole grains. · Include your family in your fitness plan. Most people think of activities such as jogging or tennis as the way to fitness, but there are many ways you and your family can be more active. Anything that makes you breathe hard and gets your heart pumping is exercise. Here are some tips:  ? Walk to do errands or to take your child to school or the bus.  ? Go for a family bike ride after dinner instead of watching TV. Where can you learn more?   Go to http://erica-venice.info/  Enter O239 in the search box to learn more about \"A Healthy Lifestyle: Care Instructions. \"  Current as of: May 28, 2019Content Version: 12.4  © 0494-8866 [employer]. Care instructions adapted under license by [employer] (which disclaims liability or warranty for this information). If you have questions about a medical condition or this instruction, always ask your healthcare professional. [address] any warranty or liability for your use of this information.

## 2020-03-19 NOTE — PROGRESS NOTES
Chief Complaint   Patient presents with    Attention Deficit Disorder     1 year follow up with medication refill. Health Maintenance Due   Topic Date Due    Influenza Age 5 to Adult  08/01/2019     Patient refused the flu vaccine and states she doesn't get one.    1. Have you been to the ER, urgent care clinic or hospitalized since your last visit? YES. Several Emergency room visits. See encounters in patient's chart. 2. Have you seen or consulted any other health care providers outside of the [address] [place] [address] since your last visit (Include any pap smears or colon screening)? NO      Do you have an Advanced Directive? NO    Would you like information on Advanced Directives? YES    Learning Assessment 3/19/2020   PRIMARY LEARNER Patient   PRIMARY LANGUAGE ENGLISH   LEARNER PREFERENCE PRIMARY LISTENING   ANSWERED BY patient   RELATIONSHIP SELF     Abuse Screening Questionnaire 3/19/2020   Do you ever feel afraid of your partner? N   Are you in a relationship with someone who physically or mentally threatens you? N   Is it safe for you to go home? Y     3 most recent PHQ Screens 3/19/2020   Little interest or pleasure in doing things Not at all   Feeling down, depressed, irritable, or hopeless Not at all   Total Score PHQ 2 0     Fall Risk Assessment, last 12 mths 3/19/2020   Able to walk? Yes   Fall in past 12 months?  No

## 2020-03-22 PROBLEM — R20.2 PARESTHESIA OF BOTH LOWER EXTREMITIES: Status: RESOLVED | Noted: 2017-05-24 | Resolved: 2020-03-22

## 2020-03-22 PROBLEM — E78.5 DYSLIPIDEMIA: Status: RESOLVED | Noted: 2019-03-02 | Resolved: 2020-03-22

## 2020-03-22 PROBLEM — R20.2 PARESTHESIA AND PAIN OF BOTH UPPER EXTREMITIES: Status: RESOLVED | Noted: 2017-05-24 | Resolved: 2020-03-22

## 2020-03-22 PROBLEM — M79.601 PARESTHESIA AND PAIN OF BOTH UPPER EXTREMITIES: Status: RESOLVED | Noted: 2017-05-24 | Resolved: 2020-03-22

## 2020-03-22 PROBLEM — Z79.899 CONTROLLED SUBSTANCE AGREEMENT SIGNED: Status: RESOLVED | Noted: 2017-12-12 | Resolved: 2020-03-22

## 2020-03-22 PROBLEM — M79.602 PARESTHESIA AND PAIN OF BOTH UPPER EXTREMITIES: Status: RESOLVED | Noted: 2017-05-24 | Resolved: 2020-03-22

## 2020-03-22 NOTE — PROGRESS NOTES
HPI:   Candy Christine is a 45y.o. year old female who presents today for a physical exam. She has a history of allergic rhinitis, attention deficit disorder, Raynaud's phenomenon, and chronic neck and back pain. She reports that she is doing well and is currently without new complaints. 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 She does not exercise. She states that she has a long history dating back for at least 10 years where she has been experiencing chronic pain, particularly in her lower back with bilateral numbness and tingling in both legs, and in her neck. She was evaluated in [id number] by Dr. Mallory Vasquez, and underwent EMG/ NCS reportedly showing right C7-8 radiculopathy, right L5 radiculopathy and bilateral sensory polyneuropathy. She had a lumbar MRI at [facility] showing mild degenerative disc changes. She also had a brain MRI which was negative. She was reportedly found to have a low B12 level and was treated with a supplement. She has used naproxen intermittently over the years for discomfort. In 5/2017, she presented to Dr. Sara Anderson for evaluation and underwent repeat EMG/ NCS, which were reportedly \"normal\". She also had ALIS which was strongly positive 1:1280 in speckled pattern. RF, ESR, TSH, and folate were normal. B12 was low normal, but MMA was in normal range. She was last seen on 10/14/2017 and pain in her ribs and neck, but most significant was pain in her lower back with bilateral sciatica. She denied pain in her hands or other peripheral joints, and denied rashes, oral ulcers, shortness of breath, or hematuria. She also reported episodes where her hands and feet become cold, white, and painful, and she stated that she carried hand and foot warmers with her year round. Lab evaluation included ESR, C-reactive protein, rheumatoid factor and CCP, and HLA-B27 which were all negative.  She was referred to rheumatology but did not show for her appointment. She has a history of palpitations, and presented to [facility] ED in 12/2016 for evaluation. EKG showed frequent PVCs, and she was advised to decrease her caffeine consumption. She states that the palpitations seem to have improved, and she is no longer experiencing episodes. She does not exercise regularly. She denies any chest pain, shortness of breath at rest or with exertion, lightheadedness, or edema. She does have a history of attention deficit disorder, diagnosed at the age of 16. She was initially treated with Ritalin, but discontinued taking it. She has been treated with Adderall for the last two years. She has a history of allergic rhinitis and seasonal allergies. She is being treated with Singulair and Nasacort. Past Medical History:   Diagnosis Date    ADD (attention deficit disorder)     Allergic rhinitis     Chronic low back pain     Palpitations     Positive ALIS (antinuclear antibody) 05/2017    PVC's (premature ventricular contractions)     Raynaud's phenomenon      History reviewed. No pertinent surgical history. Current Outpatient Medications   Medication Sig    ergocalciferol (ERGOCALCIFEROL) 1,250 mcg (50,000 unit) capsule Take 1 Cap by mouth every seven (7) days.  amphetamine-dextroamphetamine XR (Adderall XR) 25 mg XR capsule Take 1 Cap by mouth every morning. Max Daily Amount: 25 mg.    montelukast (SINGULAIR) 10 mg tablet Take 1 Tab by mouth daily.  cetirizine (ZYRTEC) 10 mg tablet Take  by mouth.  triamcinolone (NASACORT AQ) 55 mcg nasal inhaler USE 2 SPRAYS IN EACH NOSTRIL 2 TIMES A DAY     No current facility-administered medications for this visit. Allergies and Intolerances: Allergies   Allergen Reactions    Cephalosporins Shortness of Breath and Rash     Family History: Her mother has sarcoid and was diagnosed with DCIS breast cancer at the age of 62. She is s/p bilateral mastectomy.  Her paternal grandmother had breast and ovarian cancer. She has no family history of colon cancer. Family History   Problem Relation Age of Onset    Hypertension Mother      Social History:   She  reports that she has never smoked. She has never used smokeless tobacco. She is  with two children. She was working as the  for a [employer], but she is no longer working. Social History     Substance and Sexual Activity   Alcohol Use Yes    Alcohol/week: 1.0 standard drinks    Types: 1 Shots of liquor per week    Comment: 2 monthly     Immunization History:  Immunization History   Administered Date(s) Administered    TD Vaccine 10/19/2005    Tdap 10/04/2017       Review of Systems:   As above included in HPI. Otherwise 11 point review of systems negative including constitutional, skin, HENT, eyes, respiratory, cardiovascular, gastrointestinal, genitourinary, musculoskeletal, endocrine, hematologic, allergy, and neurologic. Physical:   Vitals:   BP: 110/68; repeat 126/74 right arm  HR: 94  WT: 153 lb (69.4 kg)  BMI:  22.89 kg/m2    Exam:   Patient appears in no apparent distress. Affect is appropriate. HEENT --Anicteric sclerae, tympanic membranes normal,  ear canals normal.  PERRL, EOMI, conjunctiva and lids normal.   Sinuses were nontender, turbinates normal, hearing normal.  Oropharynx without  erythema, normal tongue, oral mucosa and tonsils. No cervical lymphadenopathy. No thyromegaly, JVD, or bruits. Carotid pulses 2+ with normal upstroke. Lungs --Clear to auscultation. No wheezing or rales. Heart --Regular rate and rhythm, no murmurs, rubs, gallops, or clicks. Chest wall --Nontender to palpation. PMI normal.  Abdomen -- Soft and nontender, no hepatosplenomegaly or masses. Extremities -- Without cyanosis, clubbing, edema. 2+ pulses equally and bilaterally.   Normal looking digits, ROM intact  Neuro -- CN 2-12 intact, strength 5/5 with intact soft touch in all extremities  Derm - no obvious abnormalities noted, no rash      Review of Data:  Labs:  Orders Only on 02/25/2020   Component Date Value Ref Range Status    WBC 02/25/2020 7.3  3.4 - 10.8 x10E3/uL Final    RBC 02/25/2020 4.98  3.77 - 5.28 x10E6/uL Final    HGB 02/25/2020 12.9  11.1 - 15.9 g/dL Final    HCT 02/25/2020 41.3  34.0 - 46.6 % Final    MCV 02/25/2020 83  79 - 97 fL Final    MCH 02/25/2020 25.9* 26.6 - 33.0 pg Final    MCHC 02/25/2020 31.2* 31.5 - 35.7 g/dL Final    RDW 02/25/2020 14.4  11.7 - 15.4 % Final    PLATELET 46/74/3133 578  150 - 450 x10E3/uL Final    NEUTROPHILS 02/25/2020 67  Not Estab. % Final    Lymphocytes 02/25/2020 24  Not Estab. % Final    MONOCYTES 02/25/2020 6  Not Estab. % Final    EOSINOPHILS 02/25/2020 2  Not Estab. % Final    BASOPHILS 02/25/2020 1  Not Estab. % Final    ABS. NEUTROPHILS 02/25/2020 4.9  1.4 - 7.0 x10E3/uL Final    Abs Lymphocytes 02/25/2020 1.8  0.7 - 3.1 x10E3/uL Final    ABS. MONOCYTES 02/25/2020 0.4  0.1 - 0.9 x10E3/uL Final    ABS. EOSINOPHILS 02/25/2020 0.2  0.0 - 0.4 x10E3/uL Final    ABS. BASOPHILS 02/25/2020 0.0  0.0 - 0.2 x10E3/uL Final    IMMATURE GRANULOCYTES 02/25/2020 0  Not Estab. % Final    ABS. IMM.  GRANS. 02/25/2020 0.0  0.0 - 0.1 x10E3/uL Final    Cholesterol, total 02/25/2020 162  100 - 199 mg/dL Final    Triglyceride 02/25/2020 86  0 - 149 mg/dL Final    HDL Cholesterol 02/25/2020 51  >39 mg/dL Final    VLDL, calculated 02/25/2020 17  5 - 40 mg/dL Final    LDL, calculated 02/25/2020 94  0 - 99 mg/dL Final    TSH 02/25/2020 3.410  0.450 - 4.500 uIU/mL Final    Specific Gravity 02/25/2020 1.026  1.005 - 1.030 Final    pH (UA) 02/25/2020 5.5  5.0 - 7.5 Final    Color 02/25/2020 Yellow  Yellow Final    Appearance 02/25/2020 Clear  Clear Final    Leukocyte Esterase 02/25/2020 Negative  Negative Final    Protein 02/25/2020 Negative  Negative/Trace Final    Glucose 02/25/2020 Negative  Negative Final    Ketone 02/25/2020 Negative  Negative Final    Blood 02/25/2020 Negative  Negative Final    Bilirubin 02/25/2020 Negative  Negative Final    Urobilinogen 02/25/2020 0.2  0.2 - 1.0 mg/dL Final    Nitrites 02/25/2020 Negative  Negative Final    Microscopic Examination 02/25/2020 Comment   Final    Microscopic exam 02/25/2020 See additional order   Final    VITAMIN D, 25-HYDROXY 02/25/2020 23.6* 30.0 - 100.0 ng/mL Final    Glucose 02/25/2020 84  65 - 99 mg/dL Final    BUN 02/25/2020 12  6 - 20 mg/dL Final    Creatinine 02/25/2020 0.74  0.57 - 1.00 mg/dL Final    GFR est non-AA 02/25/2020 103  >59 mL/min/1.73 Final    GFR est AA 02/25/2020 119  >59 mL/min/1.73 Final    BUN/Creatinine ratio 02/25/2020 16  9 - 23 Final    Sodium 02/25/2020 141  134 - 144 mmol/L Final    Potassium 02/25/2020 4.2  3.5 - 5.2 mmol/L Final    Chloride 02/25/2020 102  96 - 106 mmol/L Final    CO2 02/25/2020 22  20 - 29 mmol/L Final    Calcium 02/25/2020 9.2  8.7 - 10.2 mg/dL Final    Protein, total 02/25/2020 6.9  6.0 - 8.5 g/dL Final    Albumin 02/25/2020 4.3  3.8 - 4.8 g/dL Final    GLOBULIN, TOTAL 02/25/2020 2.6  1.5 - 4.5 g/dL Final    A-G Ratio 02/25/2020 1.7  1.2 - 2.2 Final    Bilirubin, total 02/25/2020 0.3  0.0 - 1.2 mg/dL Final    Alk. phosphatase 02/25/2020 62  39 - 117 IU/L Final    AST (SGOT) 02/25/2020 13  0 - 40 IU/L Final    ALT (SGPT) 02/25/2020 6  0 - 32 IU/L Final    WBC 02/25/2020 0-5  0 - 5 /hpf Final    RBC 02/25/2020 0-2  0 - 2 /hpf Final    Epithelial cells 02/25/2020 0-10  0 - 10 /hpf Final    Casts 02/25/2020 None seen  None seen /lpf Final    Crystals 02/25/2020 Present* N/A Final    Crystal type 02/25/2020 Calcium Oxalate  N/A Final    Mucus 02/25/2020 Present  Not Estab.  Final    Bacteria 02/25/2020 Few  None seen/Few Final    INTERPRETATION 02/25/2020 Note   Final     Calculated 10 year ASCVD risk score: 0.2 %    Health Maintenance:  Screening:    Mammogram: mother with DCIS; has not had baseline mammogram performed   PAP smear: well woman exam and pap smear (2/2019) negative. Due 2022. Colorectal: N/A   Depression: none   DM (HbA1c/FPG): FPG 84 (2/2020)   Hepatitis C: N/A   Falls: none   DEXA: N/A   Glaucoma: unknown   Smoking: none   Vitamin D: 23.6 (2/2020)   Medicare Wellness: N/A        Impression:  Patient Active Problem List   Diagnosis Code    Palpitations R00.2    Allergic rhinitis J30.9    ADD (attention deficit disorder) F98.8    Arthralgia M25.50    Raynaud's phenomenon I73.00    Positive ALIS (antinuclear antibody) R76.8    Chronic low back pain M54.5, G89.29    Controlled substance agreement signed Z79.899    Paresthesia and pain of both upper extremities R20.2, M79.601, M79.602    Paresthesia of both lower extremities R20.2    Dyslipidemia E78.5    Vitamin D deficiency E55.9       Plan:  1. Chronic low back pain. Patient without significant complaints today. She has reported intermittent long standing symptoms involving her lower back with sciatica. MRI lumbar from 2009 without significant disease to explain symptoms. Described pain particularly in sacroiliac joints, and also in her ribs and neck. Labs (10/2017) including HLA-B27, ESR, CRP, RF, and CCP negative. Referred to Dr. Dano Hooker, but due to prior no-shows at their office, were unwilling to schedule her for an appointment. Referred to another group, but did not schedule. 2. Positive ALIS. Strongly positive at 1:1280 speckled pattern and scleroderma Ab positive at 4.8 (9/2017). Unclear significance, but currently not reporting symptoms. 3. Raynaud's phenomenon. Symptoms appear to be consistent with Raynaud's although does not describe post warming hyperemia. Follow. 4. Vitamin D deficiency. Very low level in 2/2018 at 4.8. Treatment with ergocalciferol x 12 weeks. Did not start maintenance dose supplement. Will repeat high dose therapy for 12 weeks, and advised to begin maintenance dose supplement once completed.     5. Attention deficit disorder. Continue Adderall. 6. Allergic rhinitis. Symptoms appear controlled on Singulair and Nasacort. Follow. 7. Mild hyperlipidemia. Improved today with LDL 94. Goal of >100. Recommended to continue with dietary changes and advised increase in exercise. Follow. 8. Health maintenance. Patient unwilling to obtain an influenza vaccine. Received Tdap, and other immunizations up to date. Vitmain D level as discussed. Well woman exam and pap smear up to date. Will order baseline mammogram given family history of breast cancer in her mother. Recommended to obtain regular eye exams. Patient understands recommendations and agrees with plan.   Follow-up in 12 months for physical.

## 2020-04-07 ENCOUNTER — TELEPHONE (OUTPATIENT)
Dept: INTERNAL MEDICINE CLINIC | Age: 40
End: 2020-04-07

## 2020-04-07 NOTE — TELEPHONE ENCOUNTER
Would recommend trying decongestant (Sudafed) and Flonase. Also begin Zyrtec or Claritin. Likely allergic origin. If no improvement, will discuss arranging appointment.

## 2020-04-07 NOTE — TELEPHONE ENCOUNTER
Strated Tuesday. Left ear feels like she is underwater. Discomfort and pressure. Some headaches not horrible. NO fever.  Has not tried any otc meds

## 2020-04-21 DIAGNOSIS — F98.8 ATTENTION DEFICIT DISORDER, UNSPECIFIED HYPERACTIVITY PRESENCE: ICD-10-CM

## 2020-04-21 RX ORDER — DEXTROAMPHETAMINE SACCHARATE, AMPHETAMINE ASPARTATE MONOHYDRATE, DEXTROAMPHETAMINE SULFATE AND AMPHETAMINE SULFATE 6.25; 6.25; 6.25; 6.25 MG/1; MG/1; MG/1; MG/1
25 CAPSULE, EXTENDED RELEASE ORAL
Qty: 30 CAP | Refills: 0 | Status: SHIPPED | OUTPATIENT
Start: 2020-04-21 | End: 2020-05-21 | Stop reason: SDUPTHER

## 2020-05-21 DIAGNOSIS — F98.8 ATTENTION DEFICIT DISORDER, UNSPECIFIED HYPERACTIVITY PRESENCE: ICD-10-CM

## 2020-05-21 RX ORDER — DEXTROAMPHETAMINE SACCHARATE, AMPHETAMINE ASPARTATE MONOHYDRATE, DEXTROAMPHETAMINE SULFATE AND AMPHETAMINE SULFATE 6.25; 6.25; 6.25; 6.25 MG/1; MG/1; MG/1; MG/1
25 CAPSULE, EXTENDED RELEASE ORAL
Qty: 30 CAP | Refills: 0 | Status: SHIPPED | OUTPATIENT
Start: 2020-05-21 | End: 2020-06-17 | Stop reason: SDUPTHER

## 2020-06-17 DIAGNOSIS — F98.8 ATTENTION DEFICIT DISORDER, UNSPECIFIED HYPERACTIVITY PRESENCE: ICD-10-CM

## 2020-06-17 RX ORDER — DEXTROAMPHETAMINE SACCHARATE, AMPHETAMINE ASPARTATE MONOHYDRATE, DEXTROAMPHETAMINE SULFATE AND AMPHETAMINE SULFATE 6.25; 6.25; 6.25; 6.25 MG/1; MG/1; MG/1; MG/1
25 CAPSULE, EXTENDED RELEASE ORAL
Qty: 30 CAP | Refills: 0 | Status: SHIPPED | OUTPATIENT
Start: 2020-06-21 | End: 2020-07-20 | Stop reason: SDUPTHER

## 2020-07-20 DIAGNOSIS — F98.8 ATTENTION DEFICIT DISORDER, UNSPECIFIED HYPERACTIVITY PRESENCE: ICD-10-CM

## 2020-07-20 RX ORDER — DEXTROAMPHETAMINE SACCHARATE, AMPHETAMINE ASPARTATE MONOHYDRATE, DEXTROAMPHETAMINE SULFATE AND AMPHETAMINE SULFATE 6.25; 6.25; 6.25; 6.25 MG/1; MG/1; MG/1; MG/1
25 CAPSULE, EXTENDED RELEASE ORAL
Qty: 30 CAP | Refills: 0 | Status: SHIPPED | OUTPATIENT
Start: 2020-07-20 | End: 2020-08-19 | Stop reason: SDUPTHER

## 2020-07-24 RX ORDER — MONTELUKAST SODIUM 10 MG/1
10 TABLET ORAL DAILY
Qty: 90 TAB | Refills: 2 | Status: SHIPPED | OUTPATIENT
Start: 2020-07-24 | End: 2021-03-03

## 2020-08-19 DIAGNOSIS — F98.8 ATTENTION DEFICIT DISORDER, UNSPECIFIED HYPERACTIVITY PRESENCE: ICD-10-CM

## 2020-08-19 RX ORDER — DEXTROAMPHETAMINE SACCHARATE, AMPHETAMINE ASPARTATE MONOHYDRATE, DEXTROAMPHETAMINE SULFATE AND AMPHETAMINE SULFATE 6.25; 6.25; 6.25; 6.25 MG/1; MG/1; MG/1; MG/1
25 CAPSULE, EXTENDED RELEASE ORAL
Qty: 30 CAP | Refills: 0 | Status: SHIPPED | OUTPATIENT
Start: 2020-08-21 | End: 2020-09-21 | Stop reason: SDUPTHER

## 2020-09-21 DIAGNOSIS — F98.8 ATTENTION DEFICIT DISORDER, UNSPECIFIED HYPERACTIVITY PRESENCE: ICD-10-CM

## 2020-09-21 RX ORDER — DEXTROAMPHETAMINE SACCHARATE, AMPHETAMINE ASPARTATE MONOHYDRATE, DEXTROAMPHETAMINE SULFATE AND AMPHETAMINE SULFATE 6.25; 6.25; 6.25; 6.25 MG/1; MG/1; MG/1; MG/1
25 CAPSULE, EXTENDED RELEASE ORAL
Qty: 30 CAP | Refills: 0 | Status: SHIPPED | OUTPATIENT
Start: 2020-09-21 | End: 2020-10-21 | Stop reason: SDUPTHER

## 2020-10-21 DIAGNOSIS — F98.8 ATTENTION DEFICIT DISORDER, UNSPECIFIED HYPERACTIVITY PRESENCE: ICD-10-CM

## 2020-10-21 RX ORDER — DEXTROAMPHETAMINE SACCHARATE, AMPHETAMINE ASPARTATE MONOHYDRATE, DEXTROAMPHETAMINE SULFATE AND AMPHETAMINE SULFATE 6.25; 6.25; 6.25; 6.25 MG/1; MG/1; MG/1; MG/1
25 CAPSULE, EXTENDED RELEASE ORAL
Qty: 30 CAP | Refills: 0 | Status: SHIPPED | OUTPATIENT
Start: 2020-10-21 | End: 2020-11-23 | Stop reason: SDUPTHER

## 2020-10-21 NOTE — TELEPHONE ENCOUNTER
UDS: 02/18/2020  CSA: 05/30/2019     No access to     Last visit 03/19/2020 MD Alana Medrano   Next appointment 03/23/2021 MD Craven   Previous refill encounter(s)   09/21/2020 Adderall XR #30    Requested Prescriptions     Pending Prescriptions Disp Refills    amphetamine-dextroamphetamine XR (Adderall XR) 25 mg XR capsule 30 Cap 0     Sig: Take 1 Cap by mouth every morning. Max Daily Amount: 25 mg.  Indications: attention deficit disorder with hyperactivity

## 2020-10-21 NOTE — TELEPHONE ENCOUNTER
Reviewed report generated by the [facility]. Does not demonstrate aberrancies or inconsistencies with regard to the prescribing of controlled medications to this patient by other providers. Last filled 9/21/2020 per .

## 2020-11-23 DIAGNOSIS — F98.8 ATTENTION DEFICIT DISORDER, UNSPECIFIED HYPERACTIVITY PRESENCE: ICD-10-CM

## 2020-11-23 RX ORDER — DEXTROAMPHETAMINE SACCHARATE, AMPHETAMINE ASPARTATE MONOHYDRATE, DEXTROAMPHETAMINE SULFATE AND AMPHETAMINE SULFATE 6.25; 6.25; 6.25; 6.25 MG/1; MG/1; MG/1; MG/1
25 CAPSULE, EXTENDED RELEASE ORAL
Qty: 30 CAP | Refills: 0 | Status: SHIPPED | OUTPATIENT
Start: 2020-11-23 | End: 2020-12-22 | Stop reason: SDUPTHER

## 2021-03-16 ENCOUNTER — TELEPHONE (OUTPATIENT)
Dept: INTERNAL MEDICINE CLINIC | Age: 41
End: 2021-03-16

## 2021-03-16 DIAGNOSIS — M54.42 CHRONIC LOW BACK PAIN WITH BILATERAL SCIATICA, UNSPECIFIED BACK PAIN LATERALITY: ICD-10-CM

## 2021-03-16 DIAGNOSIS — G89.29 CHRONIC LOW BACK PAIN WITH BILATERAL SCIATICA, UNSPECIFIED BACK PAIN LATERALITY: ICD-10-CM

## 2021-03-16 DIAGNOSIS — M54.41 CHRONIC LOW BACK PAIN WITH BILATERAL SCIATICA, UNSPECIFIED BACK PAIN LATERALITY: ICD-10-CM

## 2021-03-16 DIAGNOSIS — I73.00 RAYNAUD'S PHENOMENON WITHOUT GANGRENE: ICD-10-CM

## 2021-03-16 DIAGNOSIS — E55.9 VITAMIN D DEFICIENCY: ICD-10-CM

## 2021-03-16 DIAGNOSIS — J30.2 SEASONAL ALLERGIC RHINITIS, UNSPECIFIED TRIGGER: ICD-10-CM

## 2021-03-16 DIAGNOSIS — F98.8 ATTENTION DEFICIT DISORDER, UNSPECIFIED HYPERACTIVITY PRESENCE: ICD-10-CM

## 2021-03-16 DIAGNOSIS — R76.8 POSITIVE ANA (ANTINUCLEAR ANTIBODY): ICD-10-CM

## 2021-03-16 DIAGNOSIS — Z80.3 FH: BREAST CANCER: ICD-10-CM

## 2021-03-16 DIAGNOSIS — Z00.01 ENCOUNTER FOR ROUTINE ADULT PHYSICAL EXAM WITH ABNORMAL FINDINGS: ICD-10-CM

## 2021-03-16 DIAGNOSIS — Z12.31 SCREENING MAMMOGRAM, ENCOUNTER FOR: ICD-10-CM

## 2021-03-29 DIAGNOSIS — F98.8 ATTENTION DEFICIT DISORDER, UNSPECIFIED HYPERACTIVITY PRESENCE: ICD-10-CM

## 2021-03-29 NOTE — LETTER
3/31/2021 2:16 PM 
 
Ms. Madhuri Lock [address] [address] Dear Ms. Arteagaharshbenji Gonsalez tried to reach you! Please call our office at [phone] and schedule a follow up appointment for your continued care and to refill any medications. Sincerely, Elijah Jean-Baptiste MD

## 2021-03-30 RX ORDER — DEXTROAMPHETAMINE SACCHARATE, AMPHETAMINE ASPARTATE MONOHYDRATE, DEXTROAMPHETAMINE SULFATE AND AMPHETAMINE SULFATE 6.25; 6.25; 6.25; 6.25 MG/1; MG/1; MG/1; MG/1
25 CAPSULE, EXTENDED RELEASE ORAL
Qty: 30 CAP | Refills: 0 | Status: SHIPPED | OUTPATIENT
Start: 2021-03-30 | End: 2021-04-26 | Stop reason: SDUPTHER

## 2021-03-30 NOTE — TELEPHONE ENCOUNTER
[facility]  reports [facility] date for Adderall XR as 3/30/21 for a 30 d/s. UDS done 2/18/20  CSA signed 5/31/19    Last Visit: 3/19/20 with MD Nannette Hudson  Next Appointment: no show 3/23/21  Previous Refill Encounter(s): 2/23/21 #30    Requested Prescriptions     Pending Prescriptions Disp Refills    amphetamine-dextroamphetamine XR (Adderall XR) 25 mg XR capsule 30 Cap 0     Sig: Take 1 Cap by mouth every morning. Max Daily Amount: 25 mg.  Indications: attention deficit disorder with hyperactivity

## 2021-04-26 DIAGNOSIS — I73.00 RAYNAUD'S PHENOMENON WITHOUT GANGRENE: ICD-10-CM

## 2021-04-26 DIAGNOSIS — Z00.00 LABORATORY TESTS ORDERED AS PART OF A COMPLETE PHYSICAL EXAM (CPE): Primary | ICD-10-CM

## 2021-04-26 DIAGNOSIS — E55.9 VITAMIN D DEFICIENCY: ICD-10-CM

## 2021-04-26 DIAGNOSIS — F98.8 ATTENTION DEFICIT DISORDER, UNSPECIFIED HYPERACTIVITY PRESENCE: ICD-10-CM

## 2021-04-26 RX ORDER — DEXTROAMPHETAMINE SACCHARATE, AMPHETAMINE ASPARTATE MONOHYDRATE, DEXTROAMPHETAMINE SULFATE AND AMPHETAMINE SULFATE 6.25; 6.25; 6.25; 6.25 MG/1; MG/1; MG/1; MG/1
25 CAPSULE, EXTENDED RELEASE ORAL
Qty: 30 CAP | Refills: 0 | Status: SHIPPED | OUTPATIENT
Start: 2021-04-26 | End: 2021-05-28 | Stop reason: SDUPTHER

## 2021-04-26 NOTE — TELEPHONE ENCOUNTER
[facility]  reports [facility] last fill date for Adderall XR as 3/30/21 for a 30 d/s. UDS done 2/18/20  CSA signed 5/31/19    Last Visit: 3/19/20 with MD Nayla Larsen  Next Appointment: 5/11/21 with MD Nayla Larsen  Previous Refill Encounter(s): 3/30/21 #30    Requested Prescriptions     Pending Prescriptions Disp Refills    amphetamine-dextroamphetamine XR (Adderall XR) 25 mg XR capsule 30 Cap 0     Sig: Take 1 Cap by mouth every morning. Max Daily Amount: 25 mg.  Indications: attention deficit disorder with hyperactivity

## 2021-05-05 ENCOUNTER — APPOINTMENT (OUTPATIENT)
Dept: INTERNAL MEDICINE CLINIC | Age: 41
End: 2021-05-05

## 2021-05-05 DIAGNOSIS — F98.8 ATTENTION DEFICIT DISORDER, UNSPECIFIED HYPERACTIVITY PRESENCE: ICD-10-CM

## 2021-05-05 DIAGNOSIS — Z00.00 LABORATORY TESTS ORDERED AS PART OF A COMPLETE PHYSICAL EXAM (CPE): ICD-10-CM

## 2021-05-05 DIAGNOSIS — E55.9 VITAMIN D DEFICIENCY: ICD-10-CM

## 2021-05-05 DIAGNOSIS — I73.00 RAYNAUD'S PHENOMENON WITHOUT GANGRENE: ICD-10-CM

## 2021-05-06 LAB
25(OH)D3 SERPL-MCNC: 30.8 NG/ML (ref 32–100)
A-G RATIO,AGRAT: 1.4 RATIO (ref 1.1–2.6)
ABSOLUTE LYMPHOCYTE COUNT, 10803: 1.7 K/UL (ref 1–4.8)
ALBUMIN SERPL-MCNC: 3.9 G/DL (ref 3.5–5)
ALP SERPL-CCNC: 64 U/L (ref 25–115)
ALT SERPL-CCNC: 17 U/L (ref 5–40)
ANION GAP SERPL CALC-SCNC: 6 MMOL/L (ref 3–15)
AST SERPL W P-5'-P-CCNC: 22 U/L (ref 10–37)
BASOPHILS # BLD: 0 K/UL (ref 0–0.2)
BASOPHILS NFR BLD: 1 % (ref 0–2)
BILIRUB SERPL-MCNC: 0.3 MG/DL (ref 0.2–1.2)
BILIRUB UR QL: NEGATIVE
BUN SERPL-MCNC: 12 MG/DL (ref 6–22)
CALCIUM SERPL-MCNC: 8.8 MG/DL (ref 8.4–10.5)
CHLORIDE SERPL-SCNC: 107 MMOL/L (ref 98–110)
CHOLEST SERPL-MCNC: 164 MG/DL (ref 110–200)
CLARITY: CLEAR
CO2 SERPL-SCNC: 29 MMOL/L (ref 20–32)
COLOR UR: YELLOW
CREAT SERPL-MCNC: 0.7 MG/DL (ref 0.5–1.2)
EOSINOPHIL # BLD: 0.2 K/UL (ref 0–0.5)
EOSINOPHIL NFR BLD: 3 % (ref 0–6)
ERYTHROCYTE [DISTWIDTH] IN BLOOD BY AUTOMATED COUNT: 16 % (ref 10–15.5)
GFRAA, 66117: >60
GFRNA, 66118: >60
GLOBULIN,GLOB: 2.8 G/DL (ref 2–4)
GLUCOSE SERPL-MCNC: 85 MG/DL (ref 70–99)
GLUCOSE UR QL: NEGATIVE MG/DL
GRANULOCYTES,GRANS: 61 % (ref 40–75)
HCT VFR BLD AUTO: 39.2 % (ref 35.1–46.5)
HDLC SERPL-MCNC: 3.2 MG/DL (ref 0–5)
HDLC SERPL-MCNC: 52 MG/DL
HGB BLD-MCNC: 11.5 G/DL (ref 11.7–15.5)
HGB UR QL STRIP: NEGATIVE
IMMATURE PLATELET FRACTION: 2.6 % (ref 1.1–7.1)
KETONES UR QL STRIP.AUTO: NEGATIVE MG/DL
LDL/HDL RATIO,LDHD: 1.8
LDLC SERPL CALC-MCNC: 96 MG/DL (ref 50–99)
LEUKOCYTE ESTERASE: NEGATIVE
LYMPHOCYTES, LYMLT: 29 % (ref 20–45)
MCH RBC QN AUTO: 24 PG (ref 26–34)
MCHC RBC AUTO-ENTMCNC: 29 G/DL (ref 31–36)
MCV RBC AUTO: 83 FL (ref 81–99)
MONOCYTES # BLD: 0.4 K/UL (ref 0.1–1)
MONOCYTES NFR BLD: 7 % (ref 3–12)
NEUTROPHILS # BLD AUTO: 3.6 K/UL (ref 1.8–7.7)
NITRITE UR QL STRIP.AUTO: NEGATIVE
NON-HDL CHOLESTEROL, 011976: 112 MG/DL
PH UR STRIP: 5 PH (ref 5–8)
PLATELET # BLD AUTO: 281 K/UL (ref 140–440)
PMV BLD AUTO: 11.3 FL (ref 9–13)
POTASSIUM SERPL-SCNC: 4.7 MMOL/L (ref 3.5–5.5)
PROT SERPL-MCNC: 6.7 G/DL (ref 6.4–8.3)
PROT UR QL STRIP: NEGATIVE MG/DL
RBC # BLD AUTO: 4.72 M/UL (ref 3.8–5.2)
SODIUM SERPL-SCNC: 142 MMOL/L (ref 133–145)
SP GR UR: 1.02 (ref 1–1.03)
TRIGL SERPL-MCNC: 78 MG/DL (ref 40–149)
TSH SERPL DL<=0.005 MIU/L-ACNC: 2.74 MCU/ML (ref 0.27–4.2)
URINE ASCORBIC ACID: NEGATIVE MG/DL
UROBILINOGEN UR STRIP-MCNC: <2 MG/DL
VLDLC SERPL CALC-MCNC: 16 MG/DL (ref 8–30)
WBC # BLD AUTO: 5.9 K/UL (ref 4–11)

## 2021-05-09 LAB
AMPHETAMINE, 737686: POSITIVE
BARBITURATES: NEGATIVE NG/ML
BENZODIAZEPINES, R9BE1T: NEGATIVE NG/ML
CANNABINOID, DR86L: NEGATIVE NG/ML
COCAINE/METABOLITES, MDS2T: NEGATIVE NG/ML
CREATININE URINE,3297223: 202.5 MG/DL (ref 20–300)
FENTANYL, PMD99: NEGATIVE PG/ML
MEPERIDINE, 761055: NEGATIVE NG/ML
METHADONE, 791633: NEGATIVE NG/ML
OPIATE, DR88L: NEGATIVE NG/ML
OXYCODONE/OXYMORPHONE-PM: NEGATIVE NG/ML
PH UR STRIP: 5.9 [PH] (ref 4.5–8.9)
PHENCYCLIDINE: NEGATIVE NG/ML
PLEASE NOTE, TRIC1T: ABNORMAL
PROPOXYPHENE, 791635: NEGATIVE NG/ML
SPECIFIC GRAVITY, 790387: 1.02
TRAMADOL, 711021: NEGATIVE NG/ML

## 2021-05-11 ENCOUNTER — OFFICE VISIT (OUTPATIENT)
Dept: INTERNAL MEDICINE CLINIC | Age: 41
End: 2021-05-11
Payer: COMMERCIAL

## 2021-05-11 VITALS
WEIGHT: 160 LBS | HEIGHT: 69 IN | TEMPERATURE: 97.5 F | HEART RATE: 87 BPM | OXYGEN SATURATION: 98 % | SYSTOLIC BLOOD PRESSURE: 124 MMHG | BODY MASS INDEX: 23.7 KG/M2 | DIASTOLIC BLOOD PRESSURE: 78 MMHG | RESPIRATION RATE: 16 BRPM

## 2021-05-11 DIAGNOSIS — F41.9 ANXIETY: ICD-10-CM

## 2021-05-11 DIAGNOSIS — Z00.00 ENCOUNTER FOR ROUTINE HISTORY AND PHYSICAL EXAM IN FEMALE: Primary | ICD-10-CM

## 2021-05-11 DIAGNOSIS — D64.9 ANEMIA, UNSPECIFIED TYPE: ICD-10-CM

## 2021-05-11 DIAGNOSIS — Z11.59 NEED FOR HEPATITIS C SCREENING TEST: ICD-10-CM

## 2021-05-11 DIAGNOSIS — J30.2 SEASONAL ALLERGIC RHINITIS, UNSPECIFIED TRIGGER: ICD-10-CM

## 2021-05-11 DIAGNOSIS — F98.8 ATTENTION DEFICIT DISORDER, UNSPECIFIED HYPERACTIVITY PRESENCE: ICD-10-CM

## 2021-05-11 DIAGNOSIS — F41.0 PANIC DISORDER: ICD-10-CM

## 2021-05-11 DIAGNOSIS — Z12.31 SCREENING MAMMOGRAM, ENCOUNTER FOR: ICD-10-CM

## 2021-05-11 DIAGNOSIS — E55.9 VITAMIN D DEFICIENCY: ICD-10-CM

## 2021-05-11 PROCEDURE — 99396 PREV VISIT EST AGE 40-64: CPT | Performed by: INTERNAL MEDICINE

## 2021-05-11 RX ORDER — SERTRALINE HYDROCHLORIDE 25 MG/1
25 TABLET, FILM COATED ORAL DAILY
Qty: 30 TAB | Refills: 5 | Status: SHIPPED | OUTPATIENT
Start: 2021-05-11 | End: 2021-11-11 | Stop reason: SDUPTHER

## 2021-05-11 RX ORDER — FLUTICASONE PROPIONATE 50 MCG
2 SPRAY, SUSPENSION (ML) NASAL DAILY
COMMUNITY

## 2021-05-11 RX ORDER — ACETAMINOPHEN 500 MG
2000 TABLET ORAL DAILY
Qty: 30 CAP | Refills: 5 | Status: SHIPPED | OUTPATIENT
Start: 2021-05-11 | End: 2021-12-22 | Stop reason: SDUPTHER

## 2021-05-11 NOTE — PATIENT INSTRUCTIONS
Heart-Healthy Diet: Care Instructions  Your Care Instructions     A heart-healthy diet has lots of vegetables, fruits, nuts, beans, and whole grains, and is low in salt. It limits foods that are high in saturated fat, such as meats, cheeses, and fried foods. It may be hard to change your diet, but even small changes can lower your risk of heart attack and heart disease. Follow-up care is a key part of your treatment and safety. Be sure to make and go to all appointments, and call your doctor if you are having problems. It's also a good idea to know your test results and keep a list of the medicines you take. How can you care for yourself at home? Watch your portions  · Learn what a serving is. A \"serving\" and a \"portion\" are not always the same thing. Make sure that you are not eating larger portions than are recommended. For example, a serving of pasta is ½ cup. A serving size of meat is 2 to 3 ounces. A 3-ounce serving is about the size of a deck of cards. Measure serving sizes until you are good at [place]" them. Keep in mind that restaurants often serve portions that are 2 or 3 times the size of one serving. · To keep your energy level up and keep you from feeling hungry, eat often but in smaller portions. · Eat only the number of calories you need to stay at a healthy weight. If you need to lose weight, eat fewer calories than your body burns (through exercise and other physical activity). Eat more fruits and vegetables  · Eat a variety of fruit and vegetables every day. Dark green, deep orange, red, or yellow fruits and vegetables are especially good for you. Examples include spinach, carrots, peaches, and berries. · Keep carrots, celery, and other veggies handy for snacks. Buy fruit that is in season and store it where you can see it so that you will be tempted to eat it. · Cook dishes that have a lot of veggies in them, such as stir-fries and soups.   Limit saturated and trans fat  · Read food labels, and try to avoid saturated and trans fats. They increase your risk of heart disease. · Use olive or canola oil when you cook. · Bake, broil, grill, or steam foods instead of frying them. · Choose lean meats instead of high-fat meats such as hot dogs and sausages. Cut off all visible fat when you prepare meat. · Eat fish, skinless poultry, and meat alternatives such as soy products instead of high-fat meats. Soy products, such as tofu, may be especially good for your heart. · Choose low-fat or fat-free milk and dairy products. Eat foods high in fiber  · Eat a variety of grain products every day. Include whole-grain foods that have lots of fiber and nutrients. Examples of whole-grain foods include oats, whole wheat bread, and brown rice. · Buy whole-grain breads and cereals, instead of white bread or pastries. Limit salt and sodium  · Limit how much salt and sodium you eat to help lower your blood pressure. · Taste food before you salt it. Add only a little salt when you think you need it. With time, your taste buds will adjust to less salt. · Eat fewer snack items, fast foods, and other high-salt, processed foods. Check food labels for the amount of sodium in packaged foods. · Choose low-sodium versions of canned goods (such as soups, vegetables, and beans). Limit sugar  · Limit drinks and foods with added sugar. These include candy, desserts, and soda pop. Limit alcohol  · Limit alcohol to no more than 2 drinks a day for men and 1 drink a day for women. Too much alcohol can cause health problems. When should you call for help? Watch closely for changes in your health, and be sure to contact your doctor if:    · You would like help planning heart-healthy meals. Where can you learn more? Go to http://www.[email or web address].com/  Enter V137 in the search box to learn more about \"Heart-Healthy Diet: Care Instructions. \"  Current as of: August 22, 2019               Content Version: 12.6  © 3065-0455 [employer]. Care instructions adapted under license by [employer] (which disclaims liability or warranty for this information). If you have questions about a medical condition or this instruction, always ask your healthcare professional. [address] any warranty or liability for your use of this information. Learning About Low-Sodium Foods  What foods are low in sodium? The foods you eat contain nutrients, such as vitamins and minerals. Sodium is a nutrient. Your body needs the right amount to stay healthy and work as it should. You can use the list below to help you make choices about which foods to eat. Fruits  · Fresh, frozen, canned, or dried fruit  Vegetables  · Fresh or frozen vegetables, with no added salt  · Canned vegetables, low-sodium or with no added salt  Grains  · Bagels without salted tops  · Cereal with no added salt  · Corn tortillas  · Crackers with no added salt  · Oatmeal, cooked without salt  · Popcorn with no salt  · Pasta and noodles, cooked without salt  · Rice, cooked without salt  · Unsalted pretzels  Dairy and dairy alternatives  · Butter, unsalted  · Cream cheese  · Ice cream  · Milk  · Soy milk  Meats and other protein foods  · Beans and peas, canned with no salt  · Eggs  · Fresh fish (not smoked)  · Fresh meats (not smoked or cured)  · Nuts and nut butter, prepared without salt  · Poultry, not packaged with sodium solution  · Tofu, unseasoned  · Tuna, canned without salt  Seasonings  · Garlic  · Herbs and spices  · Lemon juice  · Mustard  · Olive oil  · Salt-free seasoning mixes  · Vinegar  Work with your doctor to find out how much of this nutrient you need. Depending on your health, you may need more or less of it in your diet. Where can you learn more?   Go to http://www.gray.com/  Enter S460 in the search box to learn more about \"Learning About Low-Sodium Foods.\"  Current as of: August 22, 2019               Content Version: 12.6  © 3451-2947 [employer]. Care instructions adapted under license by [employer] (which disclaims liability or warranty for this information). If you have questions about a medical condition or this instruction, always ask your healthcare professional. [address] any warranty or liability for your use of this information. Low Sodium Diet (2,000 Milligram): Care Instructions  Your Care Instructions     Too much sodium causes your body to hold on to extra water. This can raise your blood pressure and force your heart and kidneys to work harder. In very serious cases, this could cause you to be put in the hospital. It might even be life-threatening. By limiting sodium, you will feel better and lower your risk of serious problems. The most common source of sodium is salt. People get most of the salt in their diet from canned, prepared, and packaged foods. Fast food and restaurant meals also are very high in sodium. Your doctor will probably limit your sodium to less than 2,000 milligrams (mg) a day. This limit counts all the sodium in prepared and packaged foods and any salt you add to your food. Follow-up care is a key part of your treatment and safety. Be sure to make and go to all appointments, and call your doctor if you are having problems. It's also a good idea to know your test results and keep a list of the medicines you take. How can you care for yourself at home? Read food labels  · Read labels on cans and food packages. The labels tell you how much sodium is in each serving. Make sure that you look at the serving size. If you eat more than the serving size, you have eaten more sodium. · Food labels also tell you the Percent Daily Value for sodium. Choose products with low Percent Daily Values for sodium.   · Be aware that sodium can come in forms other than salt, including monosodium glutamate (MSG), sodium citrate, and sodium bicarbonate (baking soda). MSG is often added to Asian food. When you eat out, you can sometimes ask for food without MSG or added salt. Buy low-sodium foods  · Buy foods that are labeled \"unsalted\" (no salt added), \"sodium-free\" (less than 5 mg of sodium per serving), or \"low-sodium\" (less than 140 mg of sodium per serving). Foods labeled \"reduced-sodium\" and \"light sodium\" may still have too much sodium. Be sure to read the label to see how much sodium you are getting. · Buy fresh vegetables, or frozen vegetables without added sauces. Buy low-sodium versions of canned vegetables, soups, and other canned goods. Prepare low-sodium meals  · Cut back on the amount of salt you use in cooking. This will help you adjust to the taste. Do not add salt after cooking. One teaspoon of salt has about 2,300 mg of sodium. · Take the salt shaker off the table. · Flavor your food with garlic, lemon juice, onion, vinegar, herbs, and spices. Do not use soy sauce, lite soy sauce, steak sauce, onion salt, garlic salt, celery salt, mustard, or ketchup on your food. · Use low-sodium salad dressings, sauces, and ketchup. Or make your own salad dressings and sauces without adding salt. · Use less salt (or none) when recipes call for it. You can often use half the salt a recipe calls for without losing flavor. Other foods such as rice, pasta, and grains do not need added salt. · Rinse canned vegetables, and cook them in fresh water. This removes some--but not all--of the salt. · Avoid water that is naturally high in sodium or that has been treated with water softeners, which add sodium. Call your local water company to find out the sodium content of your water supply. If you buy bottled water, read the label and choose a sodium-free brand. Avoid high-sodium foods  · Avoid eating:  ? Smoked, cured, salted, and canned meat, fish, and poultry.   ? Ham, [place], hot dogs, and luncheon meats. ? Regular, hard, and processed cheese and regular peanut butter. ? Crackers with salted tops, and other salted snack foods such as pretzels, chips, and salted popcorn. ? Frozen prepared meals, unless labeled low-sodium. ? Canned and dried soups, broths, and bouillon, unless labeled sodium-free or low-sodium. ? Canned vegetables, unless labeled sodium-free or low-sodium. ? [place] fries, pizza, tacos, and other fast foods. ? Pickles, olives, ketchup, and other condiments, especially soy sauce, unless labeled sodium-free or low-sodium. Where can you learn more? Go to http://www.gray.com/  Enter V843 in the search box to learn more about \"Low Sodium Diet (2,000 Milligram): Care Instructions. \"  Current as of: August 22, 2019               Content Version: 12.6  © 2076-2352 [employer]. Care instructions adapted under license by [employer] (which disclaims liability or warranty for this information). If you have questions about a medical condition or this instruction, always ask your healthcare professional. [place] [address] any warranty or liability for your use of this information.

## 2021-05-11 NOTE — PROGRESS NOTES
1. Have you been to the ER, urgent care clinic or hospitalized since your last visit? NO.     2. Have you seen or consulted any other health care providers outside of the [address] [place] [address] since your last visit (Include any pap smears or colon screening)?  NO

## 2021-05-15 PROBLEM — D64.9 ANEMIA: Status: ACTIVE | Noted: 2021-05-15

## 2021-05-15 NOTE — PROGRESS NOTES
HPI:   Юлия Fleming is a 37y.o. year old female who presents today for a physical exam. She has a history of allergic rhinitis, attention deficit disorder, Raynaud's phenomenon, and chronic neck and back pain. She reports that she is doing reasonably well. She has not yet received her COVID 19 vaccine, but is planning to obtain it. She reports that she has noted an increase in anxiety and reports an increase in frequency and severity of panic attacks, which include symptoms of hand tremor, rocking and difficulty breathing. She states that she has had difficulty with panic episodes over the years, but feels that they are currently significantly worse. She states that her father in law is ill and has been moved to a skilled nursing facility. She states that they are now in the process of moving into her father in [address] [place] [address] and she is finding it to be quite stressful. She is otherwise without new complaints. She states that she has a long history dating back for at least 10 years where she has been experiencing chronic pain, particularly in her lower back with bilateral numbness and tingling in both legs, and in her neck. She was evaluated in [id number] by Dr. Lucila Martin, and underwent EMG/ NCS reportedly showing right C7-8 radiculopathy, right L5 radiculopathy and bilateral sensory polyneuropathy. She had a lumbar MRI at [facility] showing mild degenerative disc changes. She also had a brain MRI which was negative. She was reportedly found to have a low B12 level and was treated with a supplement. She has used naproxen intermittently over the years for discomfort. In 5/2017, she presented to Dr. Rocky Escalera for evaluation and underwent repeat EMG/ NCS, which were reportedly \"normal\". She also had ALIS which was strongly positive 1:1280 in speckled pattern. RF, ESR, TSH, and folate were normal. B12 was low normal, but MMA was in normal range.  She was last seen on 10/14/2017 and pain in her ribs and neck, but most significant was pain in her lower back with bilateral sciatica. She denied pain in her hands or other peripheral joints, and denied rashes, oral ulcers, shortness of breath, or hematuria. She also reported episodes where her hands and feet become cold, white, and painful, and she stated that she carried hand and foot warmers with her year round. Lab evaluation included ESR, C-reactive protein, rheumatoid factor and CCP, and HLA-B27 which were all negative. She was referred to rheumatology but did not show for her appointment. She has a history of palpitations, and presented to [facility] ED in 12/2016 for evaluation. EKG showed frequent PVCs, and she was advised to decrease her caffeine consumption. She states that the palpitations seem to have improved, and she is no longer experiencing episodes. She does not exercise regularly. She denies any chest pain, shortness of breath at rest or with exertion, lightheadedness, or edema. She does have a history of attention deficit disorder, diagnosed at the age of 16. She was initially treated with Ritalin, but discontinued taking it. She has been treated with Adderall for the last two years. She has a history of allergic rhinitis and seasonal allergies. She is being treated with Singulair and Nasacort. Past Medical History:   Diagnosis Date    ADD (attention deficit disorder)     Allergic rhinitis     Chronic low back pain     Palpitations     Positive ALIS (antinuclear antibody) 05/2017    PVC's (premature ventricular contractions)     Raynaud's phenomenon      No past surgical history on file. Current Outpatient Medications   Medication Sig    fluticasone propionate (Flonase Allergy Relief) 50 mcg/actuation nasal spray 2 Sprays by Both Nostrils route daily.  sertraline (ZOLOFT) 25 mg tablet Take 1 Tab by mouth daily. Indications: panic disorder    cholecalciferol (VITAMIN D3) (2,000 UNITS /50 MCG) cap capsule Take 1 Cap by mouth daily.  Indications: low vitamin D levels    amphetamine-dextroamphetamine XR (Adderall XR) 25 mg XR capsule Take 1 Cap by mouth every morning. Max Daily Amount: 25 mg. Indications: attention deficit disorder with hyperactivity    montelukast (SINGULAIR) 10 mg tablet Take 1 tablet by mouth once daily    cetirizine (ZYRTEC) 10 mg tablet Take  by mouth.  triamcinolone (NASACORT AQ) 55 mcg nasal inhaler USE 2 SPRAYS IN EACH NOSTRIL 2 TIMES A DAY     No current facility-administered medications for this visit. Allergies and Intolerances: Allergies   Allergen Reactions    Cephalosporins Shortness of Breath and Rash     Family History: Her mother has sarcoid and was diagnosed with DCIS breast cancer at the age of 62. She is s/p bilateral mastectomy. Her paternal grandmother had breast and ovarian cancer. She has no family history of colon cancer. Family History   Problem Relation Age of Onset    Hypertension Mother      Social History:   She  reports that she has never smoked. She has never used smokeless tobacco. She is  with two children. She was working as the  for a [employer], but she is no longer working. Social History     Substance and Sexual Activity   Alcohol Use Yes    Alcohol/week: 1.0 standard drinks    Types: 1 Shots of liquor per week    Comment: 2 monthly     Immunization History:  Immunization History   Administered Date(s) Administered    TD Vaccine 10/19/2005    Tdap 10/04/2017       Review of Systems:   As above included in HPI. Otherwise 11 point review of systems negative including constitutional, skin, HENT, eyes, respiratory, cardiovascular, gastrointestinal, genitourinary, musculoskeletal, endocrine, hematologic, allergy, and neurologic. Physical:   Visit Vitals  /78   Pulse 87   Temp 97.5 °F (36.4 °C) (Temporal)   Resp 16   Ht 5' 9\" (1.753 m)   Wt 160 lb (72.6 kg)   SpO2 98%   BMI 23.63 kg/m²       Exam:   Patient appears in no apparent distress.  Affect is appropriate. HEENT --Anicteric sclerae, tympanic membranes normal,  ear canals normal.  PERRL, EOMI, conjunctiva and lids normal.  No cervical lymphadenopathy. No thyromegaly, JVD, or bruits. Carotid pulses 2+ with normal upstroke. Lungs --Clear to auscultation. No wheezing or rales. Heart --Regular rate and rhythm, no murmurs, rubs, gallops, or clicks. Abdomen -- Soft and nontender, no hepatosplenomegaly or masses. Extremities -- Without cyanosis, clubbing, edema.  Normal looking digits, ROM intact  Neuro -- CN 2-12 intact, strength 5/5 with intact soft touch in all extremities  Derm - no obvious abnormalities noted, no rash        Review of Data:  Labs:  Orders Only on 05/05/2021   Component Date Value Ref Range Status    Color 05/05/2021 Yellow  Straw, Lig Final    CLARITY 05/05/2021 Clear  Clear, Sli Final    Specific Gravity 05/05/2021 1.017  1.005 - 1.03 Final    pH (UA) 05/05/2021 5.0  5.0 - 8.0 pH Final    Protein 05/05/2021 Negative  Negative, mg/dL Final    Glucose 05/05/2021 Negative  Negative mg/dL Final    Ketone 05/05/2021 Negative  Negative, mg/dL Final    Bilirubin 05/05/2021 Negative  Negative Final    Blood 05/05/2021 Negative  Negative Final    Nitrites 05/05/2021 Negative  Negative Final    Leukocyte Esterase 05/05/2021 Negative  Negative Final    Urobilinogen 05/05/2021 <2.0  <2.0 mg/dL Final    URINE ASCORBIC ACID 05/05/2021 Negative  Negative mg/dL Final    Amphetamines 05/05/2021 Positive* Jzvkhg=349 Final    Barbiturates 05/05/2021 Negative  Fjxyzi=699 ng/mL Final    Benzodiazepines 05/05/2021 Negative  Qfkalj=652 ng/mL Final    Cannabinoid 05/05/2021 Negative  Cutoff=20 ng/mL Final    Cocaine/Metabolites 05/05/2021 Negative  Kjfqqr=124 ng/mL Final    Opiates 05/05/2021 Negative  Grsoqy=153 ng/mL Final    OXYCODONE/OXYMORPHONE-PM 05/05/2021 Negative  Hvacjo=455 ng/mL Final    Tramadol 05/05/2021 Negative  Lnhyok=820 ng/mL Final    Creatinine Urine 05/05/2021 202.5  20.0 - 300.0 mg/dL Final    pH (UA) 05/05/2021 5.9  4.5 - 8.9 Final    Please note 05/05/2021 Comment   Final    Phencyclidine 05/05/2021 Negative  Cutoff=25 ng/mL Final    Methadone 05/05/2021 Negative  Wreuoc=855 ng/mL Final    Propoxyphene 05/05/2021 Negative  Uicxee=268 ng/mL Final    Meperidine 05/05/2021 Negative  Jzmctq=935 ng/mL Final    Fentanyl 05/05/2021 Negative  Lcfuqi=844 pg/mL Final    SPECIFIC GRAVITY 05/05/2021 1.019   Final   Appointment on 05/05/2021   Component Date Value Ref Range Status    VITAMIN D, 25-HYDROXY 05/05/2021 30.8* 32.0 - 100.0 ng/mL Final    TSH 05/05/2021 2.74  0.27 - 4.20 mcU/mL Final    Glucose 05/05/2021 85  70 - 99 mg/dL Final    BUN 05/05/2021 12  6 - 22 mg/dL Final    Creatinine 05/05/2021 0.7  0.5 - 1.2 mg/dL Final    Sodium 05/05/2021 142  133 - 145 mmol/L Final    Potassium 05/05/2021 4.7  3.5 - 5.5 mmol/L Final    Chloride 05/05/2021 107  98 - 110 mmol/L Final    CO2 05/05/2021 29  20 - 32 mmol/L Final    AST (SGOT) 05/05/2021 22  10 - 37 U/L Final    ALT (SGPT) 05/05/2021 17  5 - 40 U/L Final    Alk.  phosphatase 05/05/2021 64  25 - 115 U/L Final    Bilirubin, total 05/05/2021 0.3  0.2 - 1.2 mg/dL Final    Calcium 05/05/2021 8.8  8.4 - 10.5 mg/dL Final    Protein, total 05/05/2021 6.7  6.4 - 8.3 g/dL Final    Albumin 05/05/2021 3.9  3.5 - 5.0 g/dL Final    A-G Ratio 05/05/2021 1.4  1.1 - 2.6 ratio Final    Globulin 05/05/2021 2.8  2.0 - 4.0 g/dL Final    Anion gap 05/05/2021 6.0  3.0 - 15.0 mmol/L Final    GFRAA 05/05/2021 >60.0  >60.0 Final    GFRNA 05/05/2021 >60.0  >60.0 Final    Triglyceride 05/05/2021 78  40 - 149 mg/dL Final    HDL Cholesterol 05/05/2021 52  >=40 mg/dL Final    Cholesterol, total 05/05/2021 164  110 - 200 mg/dL Final    CHOLESTEROL/HDL 05/05/2021 3.2  0.0 - 5.0 Final    Non-HDL Cholesterol 05/05/2021 112  <130 mg/dL Final    LDL, calculated 05/05/2021 96  50 - 99 mg/dL Final    VLDL, calculated 05/05/2021 16  8 - 30 mg/dL Final    LDL/HDL Ratio 05/05/2021 1.8   Final    WBC 05/05/2021 5.9  4.0 - 11.0 K/uL Final    RBC 05/05/2021 4.72  3.80 - 5.20 M/uL Final    HGB 05/05/2021 11.5* 11.7 - 15.5 g/dL Final    HCT 05/05/2021 39.2  35.1 - 46.5 % Final    MCV 05/05/2021 83  81 - 99 fL Final    MCH 05/05/2021 24* 26 - 34 pg Final    MCHC 05/05/2021 29* 31 - 36 g/dL Final    RDW 05/05/2021 16.0* 10.0 - 15.5 % Final    PLATELET 76/52/7303 669  140 - 440 K/uL Final    MPV 05/05/2021 11.3  9.0 - 13.0 fL Final    NEUTROPHILS 05/05/2021 61  40 - 75 % Final    Lymphocytes 05/05/2021 29  20 - 45 % Final    MONOCYTES 05/05/2021 7  3 - 12 % Final    EOSINOPHILS 05/05/2021 3  0 - 6 % Final    BASOPHILS 05/05/2021 1  0 - 2 % Final    ABS. NEUTROPHILS 05/05/2021 3.6  1.8 - 7.7 K/uL Final    ABSOLUTE LYMPHOCYTE COUNT 05/05/2021 1.7  1.0 - 4.8 K/uL Final    ABS. MONOCYTES 05/05/2021 0.4  0.1 - 1.0 K/uL Final    ABS. EOSINOPHILS 05/05/2021 0.2  0.0 - 0.5 K/uL Final    ABS. BASOPHILS 05/05/2021 0.0  0.0 - 0.2 K/uL Final    IMMATURE PLATELET FRACTION 51/93/5447 2.6  1.1 - 7.1 % Final       Calculated 10 year ASCVD risk score: 0.1 %    Health Maintenance:  Screening:    Mammogram: mother with DCIS; has not had baseline mammogram performed   PAP smear: well woman exam and pap smear (2/2019) negative. Due 2022. Colorectal: N/A   Depression: none   DM (HbA1c/FPG): FPG 85 (5/2021)   Hepatitis C: unknown   Falls: none   DEXA: N/A   Glaucoma: unknown   Smoking: none   Vitamin D: 30.8 (5/2021)   Medicare Wellness: N/A        Impression:  Patient Active Problem List   Diagnosis Code    Allergic rhinitis J30.9    ADD (attention deficit disorder) F98.8    Raynaud's phenomenon I73.00    Positive ALIS (antinuclear antibody) R76.8    Chronic low back pain M54.5, G89.29    Vitamin D deficiency E55.9    Anxiety F41.9    Panic disorder F41.0    Anemia D64.9       Plan:  1. Hyperlipidemia.  Calculated 10 year ASCVD risk is 0.1 %, which does not place her in one of the four statin benefit groups as per new AHA/ACC guidelines. Improved today with LDL 96 and HDL 52 which is now at goal of <100. Recommended to continue with dietary changes and advised increase in exercise. Follow. 2. Anxiety with panic disorder. Reporting increase in frequency and severity of panic attacks which include symptoms of hand tremor, rocking and difficulty breathing. Also reports an overall increase in anxiety symptoms. Agreeable to begin Zoloft 25 mg daily. Discussed timing of beneficial effects and possible side effects. Advised to send update in 4 weeks regarding treatment. 3. Attention deficit disorder. Continue Adderall. Discussed that elevated blood pressure and anxiety may be exacerbated by use of Adderall, but patient feels benefits outweigh risks. Will continue to address. 4. Allergic rhinitis. Symptoms appear controlled on Singulair, Zyrtec, and Nasacort. Follow. 5. Vitamin D deficiency. Very low level in 2/2018 at 4.8. Treatment with ergocalciferol x 12 weeks. Did not start maintenance dose supplement and repeated high dose therapy for 12 weeks after last visit. Improved to low normal today, and again urged initiation of maintenance dose supplement of 2000 U daily. Will continue to monitor. 6. Chronic low back pain. Patient without significant complaints today. She has reported intermittent long standing symptoms involving her lower back with sciatica. MRI lumbar from 2009 without significant disease to explain symptoms. Described pain particularly in sacroiliac joints, and also in her ribs and neck. Labs (10/2017) including HLA-B27, ESR, CRP, RF, and CCP negative. Referred to Dr. Melchor Melgar, but due to prior no-shows at their office, was unwilling to schedule her for an appointment. Referred to another group, but did not schedule. 7. History of positive ALIS.  Strongly positive at 1:1280 speckled pattern and scleroderma Ab positive at 4.8 (9/2017). Unclear significance, but currently not reporting symptoms. 8. Mild anemia. Patient reports heavy periods. Advised to begin iron supplementation and follow-up with gynecology. Will reassess next visit. 9. Health maintenance. Has not yet received the COVID 19 vaccine and urged to do so. Received Tdap, and other immunizations up to date. Vitmain D level as discussed. Well woman exam and pap smear up to date. Will order baseline mammogram given family history of breast cancer in her mother. Urged to schedule. Recommended to obtain regular eye exams. Will check hepatitis C antibody level at next visit. Patient understands recommendations and agrees with plan.   Follow-up in 12 months for physical.

## 2021-05-28 DIAGNOSIS — F98.8 ATTENTION DEFICIT DISORDER, UNSPECIFIED HYPERACTIVITY PRESENCE: ICD-10-CM

## 2021-06-01 RX ORDER — DEXTROAMPHETAMINE SACCHARATE, AMPHETAMINE ASPARTATE MONOHYDRATE, DEXTROAMPHETAMINE SULFATE AND AMPHETAMINE SULFATE 6.25; 6.25; 6.25; 6.25 MG/1; MG/1; MG/1; MG/1
25 CAPSULE, EXTENDED RELEASE ORAL
Qty: 30 CAPSULE | Refills: 0 | Status: SHIPPED | OUTPATIENT
Start: 2021-06-01 | End: 2021-07-02 | Stop reason: SDUPTHER

## 2021-06-01 NOTE — TELEPHONE ENCOUNTER
LAST OV:5/11/2021  LAST FILL per : 4/30/21  Reviewed report generated by the [place] [employer] [facility]. Does not demonstrate aberrancies or inconsistencies with regard to the prescribing of controlled medications to this patient by other providers.     LAST UDS: 5/5/21

## 2021-07-02 DIAGNOSIS — F98.8 ATTENTION DEFICIT DISORDER, UNSPECIFIED HYPERACTIVITY PRESENCE: ICD-10-CM

## 2021-07-02 RX ORDER — DEXTROAMPHETAMINE SACCHARATE, AMPHETAMINE ASPARTATE MONOHYDRATE, DEXTROAMPHETAMINE SULFATE AND AMPHETAMINE SULFATE 6.25; 6.25; 6.25; 6.25 MG/1; MG/1; MG/1; MG/1
25 CAPSULE, EXTENDED RELEASE ORAL
Qty: 30 CAPSULE | Refills: 0 | Status: SHIPPED | OUTPATIENT
Start: 2021-07-02 | End: 2021-08-04 | Stop reason: SDUPTHER

## 2021-07-02 NOTE — TELEPHONE ENCOUNTER
[facility]R as 6[facility]4/21 for a 30 d/s. UDS done 5/5/21  CSA signed 5/31/19    Last Visit: 5/11/21 with MD Martha Yates  Next Appointment: 5/11/22 with MD Craven  Previous Refill Encounter(s): 6/1/21 #30    Requested Prescriptions     Pending Prescriptions Disp Refills    amphetamine-dextroamphetamine XR (Adderall XR) 25 mg XR capsule 30 Capsule 0     Sig: Take 1 Capsule by mouth every morning. Max Daily Amount: 25 mg.  Indications: attention deficit disorder with hyperactivity

## 2021-08-04 DIAGNOSIS — F98.8 ATTENTION DEFICIT DISORDER, UNSPECIFIED HYPERACTIVITY PRESENCE: ICD-10-CM

## 2021-08-04 RX ORDER — DEXTROAMPHETAMINE SACCHARATE, AMPHETAMINE ASPARTATE MONOHYDRATE, DEXTROAMPHETAMINE SULFATE AND AMPHETAMINE SULFATE 6.25; 6.25; 6.25; 6.25 MG/1; MG/1; MG/1; MG/1
25 CAPSULE, EXTENDED RELEASE ORAL
Qty: 30 CAPSULE | Refills: 0 | Status: SHIPPED | OUTPATIENT
Start: 2021-08-04 | End: 2021-09-01 | Stop reason: SDUPTHER

## 2021-08-04 RX ORDER — SERTRALINE HYDROCHLORIDE 25 MG/1
25 TABLET, FILM COATED ORAL DAILY
Qty: 30 TABLET | Refills: 5 | Status: CANCELLED | OUTPATIENT
Start: 2021-08-04

## 2021-08-04 NOTE — TELEPHONE ENCOUNTER
Zoloft was sent on 5/11/21 #30 with 5 refills    [facility]derall XR as 7/2/21 for a 30 d/s. UDS done 5/5/21  CSA signed 5/31/19    Last Visit: 5/11/21 with MD Jean Kirkpatrick  Next Appointment: 5/11/22 with MD Craven  Previous Refill Encounter(s): 7/2/21 #30    Requested Prescriptions     Pending Prescriptions Disp Refills    amphetamine-dextroamphetamine XR (Adderall XR) 25 mg XR capsule 30 Capsule 0     Sig: Take 1 Capsule by mouth every morning. Max Daily Amount: 25 mg.  Indications: attention deficit disorder with hyperactivity

## 2021-09-01 DIAGNOSIS — F98.8 ATTENTION DEFICIT DISORDER, UNSPECIFIED HYPERACTIVITY PRESENCE: ICD-10-CM

## 2021-09-02 RX ORDER — DEXTROAMPHETAMINE SACCHARATE, AMPHETAMINE ASPARTATE MONOHYDRATE, DEXTROAMPHETAMINE SULFATE AND AMPHETAMINE SULFATE 6.25; 6.25; 6.25; 6.25 MG/1; MG/1; MG/1; MG/1
25 CAPSULE, EXTENDED RELEASE ORAL
Qty: 30 CAPSULE | Refills: 0 | Status: SHIPPED | OUTPATIENT
Start: 2021-09-02 | End: 2021-10-10 | Stop reason: SDUPTHER

## 2021-09-02 NOTE — TELEPHONE ENCOUNTER
[facility] Adderall XR as 8/4/21 for a 30 d/s. UDS done 5/5/21  CSA signed 5/31/19    Last Visit: 5/11/21 with MD Kal Zamarripa  Next Appointment: 5/11/22 with MD Kal Zamarripa  Previous Refill Encounter(s): 8/4/21 #30    Requested Prescriptions     Pending Prescriptions Disp Refills    amphetamine-dextroamphetamine XR (Adderall XR) 25 mg XR capsule 30 Capsule 0     Sig: Take 1 Capsule by mouth every morning. Max Daily Amount: 25 mg.  Indications: attention deficit disorder with hyperactivity

## 2021-09-18 ENCOUNTER — HOSPITAL ENCOUNTER (EMERGENCY)
Age: 41
Discharge: HOME | End: 2021-09-18
Attending: EMERGENCY MEDICINE
Payer: COMMERCIAL

## 2021-09-18 VITALS
WEIGHT: 150 LBS | HEART RATE: 89 BPM | SYSTOLIC BLOOD PRESSURE: 132 MMHG | TEMPERATURE: 98.9 F | RESPIRATION RATE: 16 BRPM | HEIGHT: 69 IN | BODY MASS INDEX: 22.22 KG/M2 | DIASTOLIC BLOOD PRESSURE: 84 MMHG | OXYGEN SATURATION: 99 %

## 2021-09-18 DIAGNOSIS — I47.1 SVT (SUPRAVENTRICULAR TACHYCARDIA) (HCC): Primary | ICD-10-CM

## 2021-09-18 LAB
ALBUMIN SERPL-MCNC: 3.9 G/DL (ref 3.4–5)
ALBUMIN/GLOB SERPL: 1 {RATIO} (ref 0.8–1.7)
ALP SERPL-CCNC: 84 U/L (ref 45–117)
ALT SERPL-CCNC: 28 U/L (ref 13–56)
ANION GAP SERPL CALC-SCNC: 2 MMOL/L (ref 3–18)
AST SERPL-CCNC: 23 U/L (ref 10–38)
BASOPHILS # BLD: 0 K/UL (ref 0–0.1)
BASOPHILS NFR BLD: 0 % (ref 0–2)
BILIRUB SERPL-MCNC: 0.4 MG/DL (ref 0.2–1)
BUN SERPL-MCNC: 12 MG/DL (ref 7–18)
BUN/CREAT SERPL: 14 (ref 12–20)
CALCIUM SERPL-MCNC: 8.5 MG/DL (ref 8.5–10.1)
CHLORIDE SERPL-SCNC: 105 MMOL/L (ref 100–111)
CO2 SERPL-SCNC: 31 MMOL/L (ref 21–32)
CREAT SERPL-MCNC: 0.87 MG/DL (ref 0.6–1.3)
DIFFERENTIAL METHOD BLD: ABNORMAL
EOSINOPHIL # BLD: 0.1 K/UL (ref 0–0.4)
EOSINOPHIL NFR BLD: 2 % (ref 0–5)
ERYTHROCYTE [DISTWIDTH] IN BLOOD BY AUTOMATED COUNT: 16 % (ref 11.6–14.5)
GLOBULIN SER CALC-MCNC: 4.1 G/DL (ref 2–4)
GLUCOSE SERPL-MCNC: 102 MG/DL (ref 74–99)
HCT VFR BLD AUTO: 37.8 % (ref 35–45)
HGB BLD-MCNC: 11.4 G/DL (ref 12–16)
LYMPHOCYTES # BLD: 1.4 K/UL (ref 0.9–3.6)
LYMPHOCYTES NFR BLD: 20 % (ref 21–52)
MAGNESIUM SERPL-MCNC: 2.1 MG/DL (ref 1.6–2.6)
MCH RBC QN AUTO: 22.8 PG (ref 24–34)
MCHC RBC AUTO-ENTMCNC: 30.2 G/DL (ref 31–37)
MCV RBC AUTO: 75.4 FL (ref 78–100)
MONOCYTES # BLD: 0.4 K/UL (ref 0.05–1.2)
MONOCYTES NFR BLD: 5 % (ref 3–10)
NEUTS SEG # BLD: 5.2 K/UL (ref 1.8–8)
NEUTS SEG NFR BLD: 73 % (ref 40–73)
PLATELET # BLD AUTO: 360 K/UL (ref 135–420)
PMV BLD AUTO: 10.2 FL (ref 9.2–11.8)
POTASSIUM SERPL-SCNC: 3.7 MMOL/L (ref 3.5–5.5)
PROT SERPL-MCNC: 8 G/DL (ref 6.4–8.2)
RBC # BLD AUTO: 5.01 M/UL (ref 4.2–5.3)
SODIUM SERPL-SCNC: 138 MMOL/L (ref 136–145)
WBC # BLD AUTO: 7.2 K/UL (ref 4.6–13.2)

## 2021-09-18 PROCEDURE — 99284 EMERGENCY DEPT VISIT MOD MDM: CPT

## 2021-09-18 PROCEDURE — 93005 ELECTROCARDIOGRAM TRACING: CPT

## 2021-09-18 PROCEDURE — 74011250637 HC RX REV CODE- 250/637: Performed by: EMERGENCY MEDICINE

## 2021-09-18 PROCEDURE — 80053 COMPREHEN METABOLIC PANEL: CPT

## 2021-09-18 PROCEDURE — 85025 COMPLETE CBC W/AUTO DIFF WBC: CPT

## 2021-09-18 PROCEDURE — 83735 ASSAY OF MAGNESIUM: CPT

## 2021-09-18 RX ORDER — METOPROLOL TARTRATE 25 MG/1
12.5 TABLET, FILM COATED ORAL
Status: COMPLETED | OUTPATIENT
Start: 2021-09-18 | End: 2021-09-18

## 2021-09-18 RX ORDER — METOPROLOL SUCCINATE 25 MG/1
12.5 TABLET, EXTENDED RELEASE ORAL DAILY
Qty: 15 TABLET | Refills: 0 | Status: SHIPPED | OUTPATIENT
Start: 2021-09-18 | End: 2021-10-18

## 2021-09-18 RX ADMIN — METOPROLOL TARTRATE 12.5 MG: 25 TABLET, FILM COATED ORAL at 17:56

## 2021-09-18 NOTE — ROUTINE PROCESS
Lizz Deshpande is a 37 y.o. female that was discharged in stable. Pt was accompanied by friend. Pt is not driving. The patients diagnosis, condition and treatment were explained to  patient and aftercare instructions were given. The patient verbalized understanding. Patient armband removed and shredded.

## 2021-09-18 NOTE — ED PROVIDER NOTES
EMERGENCY DEPARTMENT HISTORY AND PHYSICAL EXAM  This was created with voice recognition software and transcription errors may be present.     5:03 PM  Date: (Not on file)  Patient Name: Joel Dunlap    History of Presenting Illness     Chief Complaint:    History Provided By:     HPI: Joel Dunlap is a 37 y.o. female prevascular ADD allergic rhinitis palpitations positive ALIS PVCs presents with palpitations. Patient states she felt her heart racing and felt like she was going to pass out denies any chest pain or nausea at that time. No fevers or chills. Patient states she has had this as a kid but was always told that nothing was wrong. She was able to track her apple watch and bring in a printout of a narrow complex tachycardia with a rate of 230 it broke on its own. PCP: Kady Jarquin MD      Past History     Past Medical History:  Past Medical History:   Diagnosis Date    ADD (attention deficit disorder)     Allergic rhinitis     Chronic low back pain     Palpitations     Positive ALIS (antinuclear antibody) 05/2017    PVC's (premature ventricular contractions)     Raynaud's phenomenon        Past Surgical History:  History reviewed. No pertinent surgical history. Family History:  Family History   Problem Relation Age of Onset    Hypertension Mother        Social History:  Social History     Tobacco Use    Smoking status: Never Smoker    Smokeless tobacco: Never Used   Substance Use Topics    Alcohol use: Yes     Alcohol/week: 1.0 standard drinks     Types: 1 Shots of liquor per week     Comment: 2 monthly    Drug use: No       Allergies: Allergies   Allergen Reactions    Cephalosporins Shortness of Breath and Rash       Review of Systems     Review of Systems   Constitutional: Negative for activity change. HENT: Negative for congestion. Respiratory: Negative for apnea. All other systems reviewed and are negative.     10 point review of systems otherwise negative unless noted in HPI. Physical Exam       Physical Exam  Constitutional:       Appearance: She is well-developed. HENT:      Head: Normocephalic and atraumatic. Eyes:      Pupils: Pupils are equal, round, and reactive to light. Cardiovascular:      Rate and Rhythm: Normal rate and regular rhythm. Heart sounds: Normal heart sounds. No murmur heard. No friction rub. Pulmonary:      Effort: Pulmonary effort is normal. No respiratory distress. Breath sounds: Normal breath sounds. No wheezing. Abdominal:      General: There is no distension. Palpations: Abdomen is soft. Tenderness: There is no abdominal tenderness. There is no guarding or rebound. Musculoskeletal:         General: Normal range of motion. Cervical back: Normal range of motion and neck supple. Skin:     General: Skin is warm and dry. Neurological:      Mental Status: She is alert and oriented to person, place, and time. Psychiatric:         Behavior: Behavior normal.         Thought Content: Thought content normal.         Diagnostic Study Results     Vital Signs  EKG: EKG here shows sinus at 99 normal axis normal intervals there is no ST elevation or depression no hypertrophy labs:   Imaging:     Medical Decision Making     ED Course: Progress Notes, Reevaluation, and Consults:    I will be the provider of record for this patient. Provider Notes (Medical Decision Making): Hernández healthy 21-year-old who presents for palpitations brought in a printout from her apple watch which showed a narrow complex tachycardia at 230 suspicious for SVT. It is regular with similar QRS morphology. She will bring this to her cardiology follow-up appointment. Will check electrolytes and put on a beta-blocker.   She states that her  had a vasectomy so she will not be getting pregnant and does not mind being placed on beta-blockers because she concerned about any impaired exercise tolerance         Diagnosis     Clinical Impression: No diagnosis found. Disposition:        Patient's Medications   Start Taking    No medications on file   Continue Taking    AMPHETAMINE-DEXTROAMPHETAMINE XR (ADDERALL XR) 25 MG XR CAPSULE    Take 1 Capsule by mouth every morning. Max Daily Amount: 25 mg. Indications: attention deficit disorder with hyperactivity    CETIRIZINE (ZYRTEC) 10 MG TABLET    Take  by mouth. CHOLECALCIFEROL (VITAMIN D3) (2,000 UNITS /50 MCG) CAP CAPSULE    Take 1 Cap by mouth daily. Indications: low vitamin D levels    FLUTICASONE PROPIONATE (FLONASE ALLERGY RELIEF) 50 MCG/ACTUATION NASAL SPRAY    2 Sprays by Both Nostrils route daily. MONTELUKAST (SINGULAIR) 10 MG TABLET    Take 1 tablet by mouth once daily    SERTRALINE (ZOLOFT) 25 MG TABLET    Take 1 Tab by mouth daily.  Indications: panic disorder   These Medications have changed    No medications on file   Stop Taking    TRIAMCINOLONE (NASACORT AQ) 55 MCG NASAL INHALER    USE 2 SPRAYS IN EACH NOSTRIL 2 TIMES A DAY

## 2021-09-18 NOTE — ED TRIAGE NOTES
Patient c/o having episode of elevated heart rate at 1355 today. States heart rate was 236. She states that she captured elevated heart rate with her Apple watch. She states near syncopal episode. She states that she had not eaten anything all day when the episode occurred.

## 2021-09-19 LAB
ATRIAL RATE: 99 BPM
CALCULATED P AXIS, ECG09: 58 DEGREES
CALCULATED R AXIS, ECG10: 61 DEGREES
CALCULATED T AXIS, ECG11: 66 DEGREES
DIAGNOSIS, 93000: NORMAL
P-R INTERVAL, ECG05: 130 MS
Q-T INTERVAL, ECG07: 338 MS
QRS DURATION, ECG06: 82 MS
QTC CALCULATION (BEZET), ECG08: 433 MS
VENTRICULAR RATE, ECG03: 99 BPM

## 2021-10-10 DIAGNOSIS — F98.8 ATTENTION DEFICIT DISORDER, UNSPECIFIED HYPERACTIVITY PRESENCE: ICD-10-CM

## 2021-10-11 RX ORDER — DEXTROAMPHETAMINE SACCHARATE, AMPHETAMINE ASPARTATE MONOHYDRATE, DEXTROAMPHETAMINE SULFATE AND AMPHETAMINE SULFATE 6.25; 6.25; 6.25; 6.25 MG/1; MG/1; MG/1; MG/1
25 CAPSULE, EXTENDED RELEASE ORAL
Qty: 30 CAPSULE | Refills: 0 | Status: SHIPPED | OUTPATIENT
Start: 2021-10-11 | End: 2021-11-11 | Stop reason: SDUPTHER

## 2021-11-11 DIAGNOSIS — F98.8 ATTENTION DEFICIT DISORDER, UNSPECIFIED HYPERACTIVITY PRESENCE: ICD-10-CM

## 2021-11-11 RX ORDER — SERTRALINE HYDROCHLORIDE 25 MG/1
25 TABLET, FILM COATED ORAL DAILY
Qty: 90 TABLET | Refills: 1 | Status: SHIPPED | OUTPATIENT
Start: 2021-11-11 | End: 2022-05-11

## 2021-11-11 RX ORDER — DEXTROAMPHETAMINE SACCHARATE, AMPHETAMINE ASPARTATE MONOHYDRATE, DEXTROAMPHETAMINE SULFATE AND AMPHETAMINE SULFATE 6.25; 6.25; 6.25; 6.25 MG/1; MG/1; MG/1; MG/1
25 CAPSULE, EXTENDED RELEASE ORAL
Qty: 30 CAPSULE | Refills: 0 | Status: SHIPPED | OUTPATIENT
Start: 2021-11-11 | End: 2021-12-22 | Stop reason: SDUPTHER

## 2021-11-11 NOTE — TELEPHONE ENCOUNTER
[facility] XR as 10/11/21 for a 30 d/s. UDS done 5/5/21  CSA signed 5/31/19    Last Visit: 5/11/21 with MD Melissa Hein  Next Appointment: 5/11/22 with MD Craven  Previous Refill Encounter(s): 5/11/21 Zoloft #30 with 5 refills, 10/11/21 Adderall #30    Requested Prescriptions     Pending Prescriptions Disp Refills    sertraline (ZOLOFT) 25 mg tablet 90 Tablet 1     Sig: Take 1 Tablet by mouth daily. Indications: panic disorder    amphetamine-dextroamphetamine XR (Adderall XR) 25 mg XR capsule 30 Capsule 0     Sig: Take 1 Capsule by mouth every morning. Max Daily Amount: 25 mg.  Indications: attention deficit disorder with hyperactivity

## 2021-12-22 DIAGNOSIS — F98.8 ATTENTION DEFICIT DISORDER, UNSPECIFIED HYPERACTIVITY PRESENCE: ICD-10-CM

## 2021-12-22 RX ORDER — DEXTROAMPHETAMINE SACCHARATE, AMPHETAMINE ASPARTATE MONOHYDRATE, DEXTROAMPHETAMINE SULFATE AND AMPHETAMINE SULFATE 6.25; 6.25; 6.25; 6.25 MG/1; MG/1; MG/1; MG/1
25 CAPSULE, EXTENDED RELEASE ORAL
Qty: 30 CAPSULE | Refills: 0 | Status: SHIPPED | OUTPATIENT
Start: 2021-12-22 | End: 2022-01-27 | Stop reason: SDUPTHER

## 2021-12-22 RX ORDER — ACETAMINOPHEN 500 MG
2000 TABLET ORAL DAILY
Qty: 90 CAPSULE | Refills: 1 | Status: SHIPPED | OUTPATIENT
Start: 2021-12-22 | End: 2022-06-24

## 2021-12-22 NOTE — TELEPHONE ENCOUNTER
Last visit 05/11/2021 MD Tana June   Next appointment 05/11/2022 MD Craven   Previous refill encounter(s)   05/11/2021 Vitamin D3 #30 with 5 refills,  11/11/2021 Adderall-XR #30    No access to      Requested Prescriptions     Pending Prescriptions Disp Refills    cholecalciferol (VITAMIN D3) (2,000 UNITS /50 MCG) cap capsule 30 Capsule 0     Sig: Take 1 Capsule by mouth daily. Indications: low vitamin D levels    amphetamine-dextroamphetamine XR (Adderall XR) 25 mg XR capsule 30 Capsule 0     Sig: Take 1 Capsule by mouth every morning. Max Daily Amount: 25 mg.  Indications: attention deficit disorder with hyperactivity

## 2021-12-23 NOTE — TELEPHONE ENCOUNTER
Reviewed report generated by the [facility]. Does not demonstrate aberrancies or inconsistencies with regard to the prescribing of controlled medications to this patient by other providers. Last filled 11/11/2021 per .

## 2022-01-27 DIAGNOSIS — F98.8 ATTENTION DEFICIT DISORDER, UNSPECIFIED HYPERACTIVITY PRESENCE: ICD-10-CM

## 2022-01-27 RX ORDER — MONTELUKAST SODIUM 10 MG/1
10 TABLET ORAL DAILY
Qty: 90 TABLET | Refills: 2 | Status: SHIPPED | OUTPATIENT
Start: 2022-01-27 | End: 2022-02-22 | Stop reason: SDUPTHER

## 2022-01-27 RX ORDER — DEXTROAMPHETAMINE SACCHARATE, AMPHETAMINE ASPARTATE MONOHYDRATE, DEXTROAMPHETAMINE SULFATE AND AMPHETAMINE SULFATE 6.25; 6.25; 6.25; 6.25 MG/1; MG/1; MG/1; MG/1
25 CAPSULE, EXTENDED RELEASE ORAL
Qty: 30 CAPSULE | Refills: 0 | Status: SHIPPED | OUTPATIENT
Start: 2022-01-27 | End: 2022-02-21 | Stop reason: SDUPTHER

## 2022-01-27 NOTE — TELEPHONE ENCOUNTER
Last Visit: 5/11/21 with MD Bernal Mention  Next Appointment: Advised to follow-up in 1 year  Previous Refill Encounter(s): 3/3/21 #90 with 2 refills    Requested Prescriptions     Pending Prescriptions Disp Refills    montelukast (SINGULAIR) 10 mg tablet 90 Tablet 2     Sig: Take 1 Tablet by mouth daily.

## 2022-01-27 NOTE — TELEPHONE ENCOUNTER
[facility]derall XR as 12/23/21 for a 30 d/s. UDS done 5/5/21  CSA signed 5/31/19    Last Visit: 5/11/21 with MD Vitaliy Cadet  Next Appointment: Oleg Merida to follow-up in 1 year  Previous Refill Encounter(s): 12/22/21 #30    Requested Prescriptions     Pending Prescriptions Disp Refills    amphetamine-dextroamphetamine XR (Adderall XR) 25 mg XR capsule 30 Capsule 0     Sig: Take 1 Capsule by mouth every morning. Max Daily Amount: 25 mg.  Indications: attention deficit disorder with hyperactivity

## 2022-01-31 ENCOUNTER — DOCUMENTATION ONLY (OUTPATIENT)
Dept: INTERNAL MEDICINE CLINIC | Age: 42
End: 2022-01-31

## 2022-02-23 RX ORDER — MONTELUKAST SODIUM 10 MG/1
10 TABLET ORAL DAILY
Qty: 90 TABLET | Refills: 2 | Status: SHIPPED | OUTPATIENT
Start: 2022-02-23

## 2022-02-23 NOTE — TELEPHONE ENCOUNTER
PCP: Roseline Schilling MD    Last appt: 3/19/2020  Future Appointments   Date Time Provider Josefa Valladares   5/3/2022  8:25 AM [facility] LAB VISIT [facility] FRANCISCO PHILLIP   5/11/2022 11:00 AM Roseline Schilling MD [facility] BS MARJAN       Requested Prescriptions     Pending Prescriptions Disp Refills    montelukast (SINGULAIR) 10 mg tablet 90 Tablet 2     Sig: Take 1 Tablet by mouth daily.

## 2022-03-19 PROBLEM — F41.9 ANXIETY: Status: ACTIVE | Noted: 2021-05-11

## 2022-03-19 PROBLEM — D64.9 ANEMIA: Status: ACTIVE | Noted: 2021-05-15

## 2022-03-19 PROBLEM — E55.9 VITAMIN D DEFICIENCY: Status: ACTIVE | Noted: 2019-03-02

## 2022-03-20 PROBLEM — R76.8 POSITIVE ANA (ANTINUCLEAR ANTIBODY): Status: ACTIVE | Noted: 2017-05-01

## 2022-03-20 PROBLEM — F41.0 PANIC DISORDER: Status: ACTIVE | Noted: 2021-05-11

## 2022-03-24 DIAGNOSIS — F98.8 ATTENTION DEFICIT DISORDER, UNSPECIFIED HYPERACTIVITY PRESENCE: ICD-10-CM

## 2022-03-24 RX ORDER — DEXTROAMPHETAMINE SACCHARATE, AMPHETAMINE ASPARTATE MONOHYDRATE, DEXTROAMPHETAMINE SULFATE AND AMPHETAMINE SULFATE 6.25; 6.25; 6.25; 6.25 MG/1; MG/1; MG/1; MG/1
25 CAPSULE, EXTENDED RELEASE ORAL
Qty: 30 CAPSULE | Refills: 0 | Status: SHIPPED | OUTPATIENT
Start: 2022-03-24 | End: 2022-04-27 | Stop reason: SDUPTHER

## 2022-03-24 NOTE — TELEPHONE ENCOUNTER
[facility] XR as 2/28/22 for a 30 d/s. UDS done 5/5/21  CSA signed 5/31/19    Last Visit: 5/11/21 with MD Tressa Dutton  Next Appointment: 5/11/22 with MD Tressa Dutton  Previous Refill Encounter(s): 2/21/22 #30    Requested Prescriptions     Pending Prescriptions Disp Refills    amphetamine-dextroamphetamine XR (Adderall XR) 25 mg XR capsule 30 Capsule 0     Sig: Take 1 Capsule by mouth every morning. Max Daily Amount: 25 mg.  Indications: attention deficit disorder with hyperactivity

## 2022-04-27 DIAGNOSIS — F98.8 ATTENTION DEFICIT DISORDER, UNSPECIFIED HYPERACTIVITY PRESENCE: ICD-10-CM

## 2022-04-27 RX ORDER — DEXTROAMPHETAMINE SACCHARATE, AMPHETAMINE ASPARTATE MONOHYDRATE, DEXTROAMPHETAMINE SULFATE AND AMPHETAMINE SULFATE 6.25; 6.25; 6.25; 6.25 MG/1; MG/1; MG/1; MG/1
25 CAPSULE, EXTENDED RELEASE ORAL
Qty: 30 CAPSULE | Refills: 0 | Status: SHIPPED | OUTPATIENT
Start: 2022-04-27 | End: 2022-05-23 | Stop reason: SDUPTHER

## 2022-04-27 NOTE — TELEPHONE ENCOUNTER
[facility]derall XR as 3/25/22 for a 30 d/s. UDS done 5/5/21  CSA signed 5/31/19    Last Visit: 5/11/21 with MD Ossie Osler  Next Appointment: 5/11/22 with MD Ossie Osler  Previous Refill Encounter(s): 3/24/22 #30    Requested Prescriptions     Pending Prescriptions Disp Refills    amphetamine-dextroamphetamine XR (Adderall XR) 25 mg XR capsule 30 Capsule 0     Sig: Take 1 Capsule by mouth every morning. Max Daily Amount: 25 mg.  Indications: attention deficit disorder with hyperactivity

## 2022-05-03 ENCOUNTER — APPOINTMENT (OUTPATIENT)
Dept: INTERNAL MEDICINE CLINIC | Age: 42
End: 2022-05-03

## 2022-05-03 DIAGNOSIS — Z11.59 NEED FOR HEPATITIS C SCREENING TEST: ICD-10-CM

## 2022-05-03 DIAGNOSIS — F41.0 PANIC DISORDER: ICD-10-CM

## 2022-05-03 DIAGNOSIS — E55.9 VITAMIN D DEFICIENCY: ICD-10-CM

## 2022-05-03 DIAGNOSIS — F41.9 ANXIETY: ICD-10-CM

## 2022-05-03 DIAGNOSIS — Z12.31 SCREENING MAMMOGRAM, ENCOUNTER FOR: ICD-10-CM

## 2022-05-03 DIAGNOSIS — F98.8 ATTENTION DEFICIT DISORDER, UNSPECIFIED HYPERACTIVITY PRESENCE: ICD-10-CM

## 2022-05-03 DIAGNOSIS — D64.9 ANEMIA, UNSPECIFIED TYPE: ICD-10-CM

## 2022-05-03 DIAGNOSIS — Z00.00 ENCOUNTER FOR ROUTINE HISTORY AND PHYSICAL EXAM IN FEMALE: ICD-10-CM

## 2022-05-03 DIAGNOSIS — J30.2 SEASONAL ALLERGIC RHINITIS, UNSPECIFIED TRIGGER: ICD-10-CM

## 2022-05-04 LAB
25(OH)D3 SERPL-MCNC: 41.8 NG/ML (ref 32–100)
A-G RATIO,AGRAT: 1.6 RATIO (ref 1.1–2.6)
ABSOLUTE LYMPHOCYTE COUNT, 10803: 2.2 K/UL (ref 1–4.8)
ALBUMIN SERPL-MCNC: 4.4 G/DL (ref 3.5–5)
ALP SERPL-CCNC: 80 U/L (ref 25–115)
ALT SERPL-CCNC: 12 U/L (ref 5–40)
ANION GAP SERPL CALC-SCNC: 9 MMOL/L (ref 3–15)
AST SERPL W P-5'-P-CCNC: 17 U/L (ref 10–37)
BASOPHILS # BLD: 0 K/UL (ref 0–0.2)
BASOPHILS NFR BLD: 0 % (ref 0–2)
BILIRUB SERPL-MCNC: 0.4 MG/DL (ref 0.2–1.2)
BUN SERPL-MCNC: 10 MG/DL (ref 6–22)
CALCIUM SERPL-MCNC: 9.2 MG/DL (ref 8.4–10.5)
CHLORIDE SERPL-SCNC: 102 MMOL/L (ref 98–110)
CHOLEST SERPL-MCNC: 184 MG/DL (ref 110–200)
CO2 SERPL-SCNC: 27 MMOL/L (ref 20–32)
CREAT SERPL-MCNC: 0.8 MG/DL (ref 0.5–1.2)
EOSINOPHIL # BLD: 0.1 K/UL (ref 0–0.5)
EOSINOPHIL NFR BLD: 1 % (ref 0–6)
ERYTHROCYTE [DISTWIDTH] IN BLOOD BY AUTOMATED COUNT: 16.6 % (ref 10–15.5)
FE % SATURATION,PSAT: 10 % (ref 20–50)
FERRITIN SERPL-MCNC: 5 NG/ML (ref 10–291)
GFRAA, 66117: >60
GFRNA, 66118: >60
GLOBULIN,GLOB: 2.7 G/DL (ref 2–4)
GLUCOSE SERPL-MCNC: 81 MG/DL (ref 70–99)
GRANULOCYTES,GRANS: 63 % (ref 40–75)
HCT VFR BLD AUTO: 39 % (ref 35.1–46.5)
HCV AB SER IA-ACNC: NORMAL
HDLC SERPL-MCNC: 3.4 MG/DL (ref 0–5)
HDLC SERPL-MCNC: 54 MG/DL
HGB BLD-MCNC: 11 G/DL (ref 11.7–15.5)
IRON,IRN: 38 MCG/DL (ref 30–160)
LDL/HDL RATIO,LDHD: 2
LDLC SERPL CALC-MCNC: 110 MG/DL (ref 50–99)
LYMPHOCYTES, LYMLT: 29 % (ref 20–45)
MCH RBC QN AUTO: 23 PG (ref 26–34)
MCHC RBC AUTO-ENTMCNC: 28 G/DL (ref 31–36)
MCV RBC AUTO: 80 FL (ref 80–99)
MONOCYTES # BLD: 0.4 K/UL (ref 0.1–1)
MONOCYTES NFR BLD: 5 % (ref 3–12)
NEUTROPHILS # BLD AUTO: 4.8 K/UL (ref 1.8–7.7)
NON-HDL CHOLESTEROL, 011976: 130 MG/DL
PLATELET # BLD AUTO: 328 K/UL (ref 140–440)
PMV BLD AUTO: 11.6 FL (ref 9–13)
POTASSIUM SERPL-SCNC: 3.8 MMOL/L (ref 3.5–5.5)
PROT SERPL-MCNC: 7.1 G/DL (ref 6.4–8.3)
RBC # BLD AUTO: 4.87 M/UL (ref 3.8–5.2)
SODIUM SERPL-SCNC: 138 MMOL/L (ref 133–145)
TIBC,TIBC: 396 MCG/DL (ref 228–428)
TRIGL SERPL-MCNC: 101 MG/DL (ref 40–149)
TSH SERPL DL<=0.005 MIU/L-ACNC: 2.8 MCU/ML (ref 0.27–4.2)
UIBC SERPL-MCNC: 358 MCG/DL (ref 110–370)
VLDLC SERPL CALC-MCNC: 20 MG/DL (ref 8–30)
WBC # BLD AUTO: 7.6 K/UL (ref 4–11)

## 2022-05-11 ENCOUNTER — OFFICE VISIT (OUTPATIENT)
Dept: INTERNAL MEDICINE CLINIC | Age: 42
End: 2022-05-11
Payer: COMMERCIAL

## 2022-05-11 VITALS
SYSTOLIC BLOOD PRESSURE: 142 MMHG | RESPIRATION RATE: 16 BRPM | WEIGHT: 171 LBS | HEIGHT: 69 IN | DIASTOLIC BLOOD PRESSURE: 72 MMHG | TEMPERATURE: 97.2 F | BODY MASS INDEX: 25.33 KG/M2 | HEART RATE: 84 BPM | OXYGEN SATURATION: 99 %

## 2022-05-11 DIAGNOSIS — Z00.00 ENCOUNTER FOR ROUTINE HISTORY AND PHYSICAL EXAM IN FEMALE: Primary | ICD-10-CM

## 2022-05-11 DIAGNOSIS — F41.0 PANIC DISORDER: ICD-10-CM

## 2022-05-11 DIAGNOSIS — Z12.31 SCREENING MAMMOGRAM FOR BREAST CANCER: ICD-10-CM

## 2022-05-11 DIAGNOSIS — F41.9 ANXIETY: ICD-10-CM

## 2022-05-11 DIAGNOSIS — D50.0 IRON DEFICIENCY ANEMIA DUE TO CHRONIC BLOOD LOSS: ICD-10-CM

## 2022-05-11 DIAGNOSIS — I10 PRIMARY HYPERTENSION: ICD-10-CM

## 2022-05-11 DIAGNOSIS — N92.0 MENORRHAGIA WITH REGULAR CYCLE: ICD-10-CM

## 2022-05-11 DIAGNOSIS — I47.1 PAROXYSMAL SVT (SUPRAVENTRICULAR TACHYCARDIA) (HCC): ICD-10-CM

## 2022-05-11 DIAGNOSIS — E78.5 HYPERLIPIDEMIA, UNSPECIFIED HYPERLIPIDEMIA TYPE: ICD-10-CM

## 2022-05-11 DIAGNOSIS — Z86.69 HISTORY OF MIGRAINE HEADACHES: ICD-10-CM

## 2022-05-11 DIAGNOSIS — F98.8 ATTENTION DEFICIT DISORDER, UNSPECIFIED HYPERACTIVITY PRESENCE: ICD-10-CM

## 2022-05-11 PROCEDURE — 99396 PREV VISIT EST AGE 40-64: CPT | Performed by: INTERNAL MEDICINE

## 2022-05-11 RX ORDER — METOPROLOL SUCCINATE 25 MG/1
25 TABLET, EXTENDED RELEASE ORAL DAILY
Qty: 90 TABLET | Refills: 3 | Status: SHIPPED | OUTPATIENT
Start: 2022-05-11

## 2022-05-11 RX ORDER — SERTRALINE HYDROCHLORIDE 50 MG/1
50 TABLET, FILM COATED ORAL DAILY
Qty: 90 TABLET | Refills: 3 | Status: SHIPPED | OUTPATIENT
Start: 2022-05-11

## 2022-05-11 RX ORDER — LANOLIN ALCOHOL/MO/W.PET/CERES
325 CREAM (GRAM) TOPICAL
Qty: 90 TABLET | Refills: 1 | Status: SHIPPED | OUTPATIENT
Start: 2022-05-11

## 2022-05-11 NOTE — PROGRESS NOTES
1. \"Have you been to the ER, urgent care clinic since your last visit? Hospitalized since your last visit? \" 9/18/22 on file    2. \"Have you seen or consulted any other health care providers outside of the [address] [place] [address] since your last visit? \" No     3. For patients aged 39-70: Has the patient had a colonoscopy / FIT/ Cologuard? NA - based on age      If the patient is female:    4. For patients aged 41-77: Has the patient had a mammogram within the past 2 years? Yes - no Care Gap present      5. For patients aged 21-65: Has the patient had a pap smear?  Yes - no Care Gap present

## 2022-05-11 NOTE — PATIENT INSTRUCTIONS
Heart-Healthy Diet: Care Instructions  Your Care Instructions     A heart-healthy diet has lots of vegetables, fruits, nuts, beans, and whole grains, and is low in salt. It limits foods that are high in saturated fat, such as meats, cheeses, and fried foods. It may be hard to change your diet, but even small changes can lower your risk of heart attack and heart disease. Follow-up care is a key part of your treatment and safety. Be sure to make and go to all appointments, and call your doctor if you are having problems. It's also a good idea to know your test results and keep a list of the medicines you take. How can you care for yourself at home? Watch your portions  · Learn what a serving is. A \"serving\" and a \"portion\" are not always the same thing. Make sure that you are not eating larger portions than are recommended. For example, a serving of pasta is ½ cup. A serving size of meat is 2 to 3 ounces. A 3-ounce serving is about the size of a deck of cards. Measure serving sizes until you are good at [place]" them. Keep in mind that restaurants often serve portions that are 2 or 3 times the size of one serving. · To keep your energy level up and keep you from feeling hungry, eat often but in smaller portions. · Eat only the number of calories you need to stay at a healthy weight. If you need to lose weight, eat fewer calories than your body burns (through exercise and other physical activity). Eat more fruits and vegetables  · Eat a variety of fruit and vegetables every day. Dark green, deep orange, red, or yellow fruits and vegetables are especially good for you. Examples include spinach, carrots, peaches, and berries. · Keep carrots, celery, and other veggies handy for snacks. Buy fruit that is in season and store it where you can see it so that you will be tempted to eat it. · Cook dishes that have a lot of veggies in them, such as stir-fries and soups.   Limit saturated and trans fat  · Read food labels, and try to avoid saturated and trans fats. They increase your risk of heart disease. · Use olive or canola oil when you cook. · Bake, broil, grill, or steam foods instead of frying them. · Choose lean meats instead of high-fat meats such as hot dogs and sausages. Cut off all visible fat when you prepare meat. · Eat fish, skinless poultry, and meat alternatives such as soy products instead of high-fat meats. Soy products, such as tofu, may be especially good for your heart. · Choose low-fat or fat-free milk and dairy products. Eat foods high in fiber  · Eat a variety of grain products every day. Include whole-grain foods that have lots of fiber and nutrients. Examples of whole-grain foods include oats, whole wheat bread, and brown rice. · Buy whole-grain breads and cereals, instead of white bread or pastries. Limit salt and sodium  · Limit how much salt and sodium you eat to help lower your blood pressure. · Taste food before you salt it. Add only a little salt when you think you need it. With time, your taste buds will adjust to less salt. · Eat fewer snack items, fast foods, and other high-salt, processed foods. Check food labels for the amount of sodium in packaged foods. · Choose low-sodium versions of canned goods (such as soups, vegetables, and beans). Limit sugar  · Limit drinks and foods with added sugar. These include candy, desserts, and soda pop. Limit alcohol  · Limit alcohol to no more than 2 drinks a day for men and 1 drink a day for women. Too much alcohol can cause health problems. When should you call for help? Watch closely for changes in your health, and be sure to contact your doctor if:    · You would like help planning heart-healthy meals. Where can you learn more? Go to http://www.CytoVale.com/  Enter V137 in the search box to learn more about \"Heart-Healthy Diet: Care Instructions. \"  Current as of: August 22, 2019               Content Version: 12.6  © 2592-0411 [employer]. Care instructions adapted under license by [employer] (which disclaims liability or warranty for this information). If you have questions about a medical condition or this instruction, always ask your healthcare professional. [address] any warranty or liability for your use of this information. Learning About Low-Sodium Foods  What foods are low in sodium? The foods you eat contain nutrients, such as vitamins and minerals. Sodium is a nutrient. Your body needs the right amount to stay healthy and work as it should. You can use the list below to help you make choices about which foods to eat. Fruits  · Fresh, frozen, canned, or dried fruit  Vegetables  · Fresh or frozen vegetables, with no added salt  · Canned vegetables, low-sodium or with no added salt  Grains  · Bagels without salted tops  · Cereal with no added salt  · Corn tortillas  · Crackers with no added salt  · Oatmeal, cooked without salt  · Popcorn with no salt  · Pasta and noodles, cooked without salt  · Rice, cooked without salt  · Unsalted pretzels  Dairy and dairy alternatives  · Butter, unsalted  · Cream cheese  · Ice cream  · Milk  · Soy milk  Meats and other protein foods  · Beans and peas, canned with no salt  · Eggs  · Fresh fish (not smoked)  · Fresh meats (not smoked or cured)  · Nuts and nut butter, prepared without salt  · Poultry, not packaged with sodium solution  · Tofu, unseasoned  · Tuna, canned without salt  Seasonings  · Garlic  · Herbs and spices  · Lemon juice  · Mustard  · Olive oil  · Salt-free seasoning mixes  · Vinegar  Work with your doctor to find out how much of this nutrient you need. Depending on your health, you may need more or less of it in your diet. Where can you learn more?   Go to http://www.gray.com/  Enter S460 in the search box to learn more about \"Learning About Low-Sodium Foods.\"  Current as of: August 22, 2019               Content Version: 12.6  © 2024-2176 [employer]. Care instructions adapted under license by [employer] (which disclaims liability or warranty for this information). If you have questions about a medical condition or this instruction, always ask your healthcare professional. [address] any warranty or liability for your use of this information. Low Sodium Diet (2,000 Milligram): Care Instructions  Your Care Instructions     Too much sodium causes your body to hold on to extra water. This can raise your blood pressure and force your heart and kidneys to work harder. In very serious cases, this could cause you to be put in the hospital. It might even be life-threatening. By limiting sodium, you will feel better and lower your risk of serious problems. The most common source of sodium is salt. People get most of the salt in their diet from canned, prepared, and packaged foods. Fast food and restaurant meals also are very high in sodium. Your doctor will probably limit your sodium to less than 2,000 milligrams (mg) a day. This limit counts all the sodium in prepared and packaged foods and any salt you add to your food. Follow-up care is a key part of your treatment and safety. Be sure to make and go to all appointments, and call your doctor if you are having problems. It's also a good idea to know your test results and keep a list of the medicines you take. How can you care for yourself at home? Read food labels  · Read labels on cans and food packages. The labels tell you how much sodium is in each serving. Make sure that you look at the serving size. If you eat more than the serving size, you have eaten more sodium. · Food labels also tell you the Percent Daily Value for sodium. Choose products with low Percent Daily Values for sodium.   · Be aware that sodium can come in forms other than salt, including monosodium glutamate (MSG), sodium citrate, and sodium bicarbonate (baking soda). MSG is often added to Asian food. When you eat out, you can sometimes ask for food without MSG or added salt. Buy low-sodium foods  · Buy foods that are labeled \"unsalted\" (no salt added), \"sodium-free\" (less than 5 mg of sodium per serving), or \"low-sodium\" (less than 140 mg of sodium per serving). Foods labeled \"reduced-sodium\" and \"light sodium\" may still have too much sodium. Be sure to read the label to see how much sodium you are getting. · Buy fresh vegetables, or frozen vegetables without added sauces. Buy low-sodium versions of canned vegetables, soups, and other canned goods. Prepare low-sodium meals  · Cut back on the amount of salt you use in cooking. This will help you adjust to the taste. Do not add salt after cooking. One teaspoon of salt has about 2,300 mg of sodium. · Take the salt shaker off the table. · Flavor your food with garlic, lemon juice, onion, vinegar, herbs, and spices. Do not use soy sauce, lite soy sauce, steak sauce, onion salt, garlic salt, celery salt, mustard, or ketchup on your food. · Use low-sodium salad dressings, sauces, and ketchup. Or make your own salad dressings and sauces without adding salt. · Use less salt (or none) when recipes call for it. You can often use half the salt a recipe calls for without losing flavor. Other foods such as rice, pasta, and grains do not need added salt. · Rinse canned vegetables, and cook them in fresh water. This removes some--but not all--of the salt. · Avoid water that is naturally high in sodium or that has been treated with water softeners, which add sodium. Call your local water company to find out the sodium content of your water supply. If you buy bottled water, read the label and choose a sodium-free brand. Avoid high-sodium foods  · Avoid eating:  ? Smoked, cured, salted, and canned meat, fish, and poultry.   ? Ham, hinton, hot dogs, and luncheon meats. ? Regular, hard, and processed cheese and regular peanut butter. ? Crackers with salted tops, and other salted snack foods such as pretzels, chips, and salted popcorn. ? Frozen prepared meals, unless labeled low-sodium. ? Canned and dried soups, broths, and bouillon, unless labeled sodium-free or low-sodium. ? Canned vegetables, unless labeled sodium-free or low-sodium. ? [place] fries, pizza, tacos, and other fast foods. ? Pickles, olives, ketchup, and other condiments, especially soy sauce, unless labeled sodium-free or low-sodium. Where can you learn more? Go to http://www.gray.com/  Enter V843 in the search box to learn more about \"Low Sodium Diet (2,000 Milligram): Care Instructions. \"  Current as of: August 22, 2019               Content Version: 12.6  © 0439-5359 [employer]. Care instructions adapted under license by [employer] (which disclaims liability or warranty for this information). If you have questions about a medical condition or this instruction, always ask your healthcare professional. [place] [address] any warranty or liability for your use of this information. Learning About High-Iron Foods  What foods are high in iron? The foods you eat contain nutrients, such as vitamins and minerals. Iron is a nutrient. Your body needs the right amount to stay healthy and work as it should. You can use the list below to help you make choices about which foods to eat. Here are some foods that contain iron. They have 1 to 2 milligrams of iron per serving.   Fruits  · Figs (dried), 5 figs  Vegetables  · Asparagus (canned), 6 yu  · [place], beet, Swiss chard, or turnip greens, 1 cup  · Dried peas, cooked, ½ cup  · Seaweed, spirulina (dried), ¼ cup  · Spinach, (cooked) ½ cup or (raw) 1 cup  Grains  · Cereals, fortified with iron, 1 cup  · Grits (instant, cooked), fortified with iron, ½ cup  Meats and other protein foods  · Beans (kidney, lima, navy, white), canned or cooked, ½ cup  · Beef or lamb, 3 oz  · Chicken giblets, 3 oz  · Chickpeas (garbanzo beans), ½ cup  · Liver of beef, lamb, or pork, 3 oz  · Oysters (cooked), 3 oz  · Sardines (canned), 3 oz  · Soybeans (boiled), ½ cup  · Tofu (firm), ½ cup  Work with your doctor to find out how much of this nutrient you need. Depending on your health, you may need more or less of it in your diet. Current as of: August 22, 2019               Content Version: 12.6  © 6903-9299 [employer]. Care instructions adapted under license by [employer]) (which disclaims liability or warranty for this information). If you have questions about a medical condition or this instruction, always ask your healthcare professional. [place] [address] any warranty or liability for your use of this information. Iron-Rich Diet: Care Instructions  Your Care Instructions     Your body needs iron to make hemoglobin. Hemoglobin is a substance in red blood cells that carries oxygen from the lungs to cells all through your body. If you do not get enough iron, your body makes fewer and smaller red blood cells. As a result, your body's cells may not get enough oxygen. Adult men need 8 milligrams of iron a day; adult women need 18 milligrams of iron a day. After menopause, women need 8 milligrams of iron a day. A pregnant woman needs 27 milligrams of iron a day. Infants and young children have higher iron needs relative to their size than other age groups. People who have lost blood because of ulcers or heavy menstrual periods may become very low in iron and may develop anemia. Most people can get the iron their bodies need by eating enough of certain iron-rich foods. Your doctor may recommend that you take an iron supplement along with eating an iron-rich diet. Follow-up care is a key part of your treatment and safety.  Be sure to make and go to all appointments, and call your doctor if you are having problems. It's also a good idea to know your test results and keep a list of the medicines you take. How can you care for yourself at home? · Make iron-rich foods a part of your daily diet. Iron-rich foods include:  ? All meats, such as chicken, beef, lamb, pork, fish, and shellfish. Liver is especially high in iron. ? Leafy green vegetables. ? Raisins, peas, beans, lentils, barley, and eggs. ? Iron-fortified breakfast cereals. · Eat foods with vitamin C along with iron-rich foods. Vitamin C helps you absorb more iron from food. Drink a glass of orange juice or another citrus juice with your food. · Eat meat and vegetables or grains together. The iron in meat helps your body absorb the iron in other foods. Where can you learn more? Go to http://www.gray.com/  Enter Z290 in the search box to learn more about \"Iron-Rich Diet: Care Instructions. \"  Current as of: August 22, 2019               Content Version: 12.6  © 2735-8801 [employer]. Care instructions adapted under license by [employer] (which disclaims liability or warranty for this information). If you have questions about a medical condition or this instruction, always ask your healthcare professional. [address] any warranty or liability for your use of this information.

## 2022-05-15 PROBLEM — I47.1 PAROXYSMAL SVT (SUPRAVENTRICULAR TACHYCARDIA) (HCC): Status: ACTIVE | Noted: 2022-05-15

## 2022-05-15 PROBLEM — Z86.69 HISTORY OF MIGRAINE HEADACHES: Status: ACTIVE | Noted: 2022-05-15

## 2022-05-15 PROBLEM — D50.0 IRON DEFICIENCY ANEMIA DUE TO CHRONIC BLOOD LOSS: Status: ACTIVE | Noted: 2021-05-15

## 2022-05-15 PROBLEM — I47.10 PAROXYSMAL SVT (SUPRAVENTRICULAR TACHYCARDIA): Status: ACTIVE | Noted: 2022-05-15

## 2022-05-15 PROBLEM — E78.5 HYPERLIPIDEMIA: Status: ACTIVE | Noted: 2019-03-02

## 2022-05-15 PROBLEM — N92.0 MENORRHAGIA WITH REGULAR CYCLE: Status: ACTIVE | Noted: 2022-05-15

## 2022-05-15 PROBLEM — E66.3 OVERWEIGHT (BMI 25.0-29.9): Status: ACTIVE | Noted: 2022-05-15

## 2022-05-15 NOTE — PROGRESS NOTES
HPI:   Luther Rose is a 40y.o. year old female who presents today for a physical exam. She has a history of hyperlipidemia, allergic rhinitis, attention deficit disorder, Raynaud's phenomenon, migraine headaches, and chronic neck and back pain. She received the first dose of the P[place]ieve COVID-19 vaccine series yesterday. She reports that she is doing reasonably well. She states that she has noted improvement since initiating Zoloft for her anxiety and panic attacks but feels that she would benefit from an increase in dose given persistent anxiety. On 9/18/2021, she reports that she developed palpitations with lightheadedness/ presyncope and recorded on her Apple Watch a narrow complex tachycardia at rate of 230 bpm.  She reports that she spontaneously reverted to sinus rhythm. She presented to [facility] ED for evaluation and evaluation showed evidence of a microcytic anemia (Hb 11.4/HCT 37.8 MCV 75.4), K 3.7, creatinine 0.87/eGFR > 60, magnesium 2.1; EKG showed sinus rhythm at 99 bpm.  She was diagnosed with paroxysmal SVT and started on metoprolol succinate 12.5 mg daily. She was referred to follow-up with cardiology. However she reports today that she did not continue metoprolol after completing the 30-day prescription and never scheduled with cardiology. She has not had a recurrence of palpitations since the episode. She reports that she has a long history of intermittent palpitations for many years and states that she did have a Holter monitor placed in the past which was negative. She is otherwise without new complaints. Summary of prior hospitalizations and medical history:  She states that she has a long history dating back for at least 10 years where she has been experiencing chronic pain, particularly in her lower back with bilateral numbness and tingling in both legs, and in her neck.  She was evaluated in [id number] by Dr. Elliot Taylor, and underwent EMG/ NCS reportedly showing right C7-8 radiculopathy, right L5 radiculopathy and bilateral sensory polyneuropathy. She had a lumbar MRI at [facility] showing mild degenerative disc changes. She also had a brain MRI which was negative. She was reportedly found to have a low B12 level and was treated with a supplement. She has used naproxen intermittently over the years for discomfort. In 5/2017, she presented to Dr. Doe Guillory for evaluation and underwent repeat EMG/ NCS, which were reportedly \"normal\". She also had ALIS which was strongly positive 1:1280 in speckled pattern. RF, ESR, TSH, and folate were normal. B12 was low normal, but MMA was in normal range. She was last seen on 10/14/2017 and pain in her ribs and neck, but most significant was pain in her lower back with bilateral sciatica. She denied pain in her hands or other peripheral joints, and denied rashes, oral ulcers, shortness of breath, or hematuria. She also reported episodes where her hands and feet become cold, white, and painful, and she stated that she carried hand and foot warmers with her year round. Lab evaluation included ESR, C-reactive protein, rheumatoid factor and CCP, and HLA-B27 which were all negative. She was referred to rheumatology but did not show for her appointment. She has a history of palpitations and presented to [facility] ED in 12/2016 for evaluation. EKG showed frequent PVCs and she was advised to decrease her caffeine consumption. She does not exercise regularly. She denies any chest pain, shortness of breath at rest or with exertion, lightheadedness, or edema. She has a history of attention deficit disorder, diagnosed at the age of 16. She was initially treated with Ritalin but discontinued taking it. She has been treated with Adderall for the last several years. She has a history of allergic rhinitis and seasonal allergies. She is being treated with Singulair and Nasacort.      She has a history of anxiety and in 5/2021 reported increasing anxiety and increase in frequency and severity of panic attacks, which include symptoms of hand tremor, rocking and difficulty breathing. She reported difficulty with panic episodes over the years, but felt that they were significantly worse. She was started on Zoloft with improvement. Past Medical History:   Diagnosis Date    ADD (attention deficit disorder)     Allergic rhinitis     Chronic low back pain     Palpitations     Positive ALIS (antinuclear antibody) 05/2017    PVC's (premature ventricular contractions)     Raynaud's phenomenon      No past surgical history on file. Current Outpatient Medications   Medication Sig    sertraline (ZOLOFT) 50 mg tablet Take 1 Tablet by mouth daily. Indications: panic disorder    metoprolol succinate (TOPROL-XL) 25 mg XL tablet Take 1 Tablet by mouth daily. Indications: high blood pressure, migraine prevention, paroxysmal supraventricular tachycardia    ferrous sulfate 325 mg (65 mg iron) tablet Take 1 Tablet by mouth Daily (before breakfast). Indications: anemia from inadequate iron    amphetamine-dextroamphetamine XR (Adderall XR) 25 mg XR capsule Take 1 Capsule by mouth every morning. Max Daily Amount: 25 mg. Indications: attention deficit disorder with hyperactivity    montelukast (SINGULAIR) 10 mg tablet Take 1 Tablet by mouth daily.  cholecalciferol (VITAMIN D3) (2,000 UNITS /50 MCG) cap capsule Take 1 Capsule by mouth daily. Indications: low vitamin D levels    fluticasone propionate (Flonase Allergy Relief) 50 mcg/actuation nasal spray 2 Sprays by Both Nostrils route daily.  cetirizine (ZYRTEC) 10 mg tablet Take  by mouth. No current facility-administered medications for this visit. Allergies and Intolerances: Allergies   Allergen Reactions    Cephalosporins Shortness of Breath and Rash     Family History: Her mother has sarcoid and was diagnosed with DCIS breast cancer at the age of 62. She is s/p bilateral mastectomy.  Her paternal grandmother had breast and ovarian cancer. She has no family history of colon cancer. Family History   Problem Relation Age of Onset    Hypertension Mother      Social History:   She  reports that she has never smoked. She has never used smokeless tobacco. She is  with two children. She was working as the  for a [employer], but she is no longer working. Social History     Substance and Sexual Activity   Alcohol Use Yes    Alcohol/week: 1.0 standard drink    Types: 1 Shots of liquor per week    Comment: 2 monthly     Immunization History:  Immunization History   Administered Date(s) Administered    COVID-19, Pfizer Purple top, DILUTE for use, 12+ yrs, 30mcg/0.3mL dose 05/10/2022    TD Vaccine 10/19/2005    Tdap 10/04/2017       Review of Systems:   As above included in HPI. Otherwise 11 point review of systems negative including constitutional, skin, HENT, eyes, respiratory, cardiovascular, gastrointestinal, genitourinary, musculoskeletal, endocrine, hematologic, allergy, and neurologic. Physical:   Visit Vitals  BP (!) 142/72   Pulse 84   Temp 97.2 °F (36.2 °C) (Temporal)   Resp 16   Ht 5' 9\" (1.753 m)   Wt 171 lb (77.6 kg)   SpO2 99%   BMI 25.25 kg/m²       Exam:   Patient appears in no apparent distress. Affect is appropriate. HEENT --Anicteric sclerae, tympanic membranes normal,  ear canals normal.  PERRL, EOMI, conjunctiva and lids normal.  No cervical lymphadenopathy. No thyromegaly, JVD, or bruits. Carotid pulses 2+ with normal upstroke. Lungs --Clear to auscultation. No wheezing or rales. Heart --Regular rate and rhythm, no murmurs, rubs, gallops, or clicks. Abdomen -- Soft and nontender, no hepatosplenomegaly or masses. Extremities -- Without cyanosis, clubbing, edema.  Normal looking digits, ROM intact  Neuro -- CN 2-12 intact, strength 5/5 with intact soft touch in all extremities  Derm - no obvious abnormalities noted, no rash        Review of Data:  Labs:  Orders Only on 05/03/2022   Component Date Value Ref Range Status    Hep C Virus Ab 05/03/2022 None Detected  None Detec Final    WBC 05/03/2022 7.6  4.0 - 11.0 K/uL Final    RBC 05/03/2022 4.87  3.80 - 5.20 M/uL Final    HGB 05/03/2022 11.0* 11.7 - 15.5 g/dL Final    HCT 05/03/2022 39.0  35.1 - 46.5 % Final    MCV 05/03/2022 80  80 - 99 fL Final    MCH 05/03/2022 23* 26 - 34 pg Final    MCHC 05/03/2022 28* 31 - 36 g/dL Final    RDW 05/03/2022 16.6* 10.0 - 15.5 % Final    PLATELET 04/49/4312 970  140 - 440 K/uL Final    MPV 05/03/2022 11.6  9.0 - 13.0 fL Final    NEUTROPHILS 05/03/2022 63  40 - 75 % Final    Lymphocytes 05/03/2022 29  20 - 45 % Final    MONOCYTES 05/03/2022 5  3 - 12 % Final    EOSINOPHILS 05/03/2022 1  0 - 6 % Final    BASOPHILS 05/03/2022 0  0 - 2 % Final    ABS. NEUTROPHILS 05/03/2022 4.8  1.8 - 7.7 K/uL Final    ABSOLUTE LYMPHOCYTE COUNT 05/03/2022 2.2  1.0 - 4.8 K/uL Final    ABS. MONOCYTES 05/03/2022 0.4  0.1 - 1.0 K/uL Final    ABS. EOSINOPHILS 05/03/2022 0.1  0.0 - 0.5 K/uL Final    ABS.  BASOPHILS 05/03/2022 0.0  0.0 - 0.2 K/uL Final    Triglyceride 05/03/2022 101  40 - 149 mg/dL Final    HDL Cholesterol 05/03/2022 54  >=40 mg/dL Final    Cholesterol, total 05/03/2022 184  110 - 200 mg/dL Final    CHOLESTEROL/HDL 05/03/2022 3.4  0.0 - 5.0 Final    Non-HDL Cholesterol 05/03/2022 130* <130 mg/dL Final    LDL, calculated 05/03/2022 110* 50 - 99 mg/dL Final    VLDL, calculated 05/03/2022 20  8 - 30 mg/dL Final    LDL/HDL Ratio 05/03/2022 2.0   Final    Ferritin 05/03/2022 5* 10 - 291 ng/mL Final    Iron 05/03/2022 38  30 - 160 mcg/dL Final    UIBC 05/03/2022 358  110 - 370 mcg/dL Final    TIBC 05/03/2022 396  228 - 428 mcg/dL Final    Iron % saturation 05/03/2022 10* 20 - 50 % Final    Glucose 05/03/2022 81  70 - 99 mg/dL Final    BUN 05/03/2022 10  6 - 22 mg/dL Final    Creatinine 05/03/2022 0.8  0.5 - 1.2 mg/dL Final    Sodium 05/03/2022 138  133 - 145 mmol/L Final    Potassium 05/03/2022 3.8  3.5 - 5.5 mmol/L Final    Chloride 05/03/2022 102  98 - 110 mmol/L Final    CO2 05/03/2022 27  20 - 32 mmol/L Final    AST (SGOT) 05/03/2022 17  10 - 37 U/L Final    ALT (SGPT) 05/03/2022 12  5 - 40 U/L Final    Alk. phosphatase 05/03/2022 80  25 - 115 U/L Final    Bilirubin, total 05/03/2022 0.4  0.2 - 1.2 mg/dL Final    Calcium 05/03/2022 9.2  8.4 - 10.5 mg/dL Final    Protein, total 05/03/2022 7.1  6.4 - 8.3 g/dL Final    Albumin 05/03/2022 4.4  3.5 - 5.0 g/dL Final    A-G Ratio 05/03/2022 1.6  1.1 - 2.6 ratio Final    Globulin 05/03/2022 2.7  2.0 - 4.0 g/dL Final    Anion gap 05/03/2022 9.0  3.0 - 15.0 mmol/L Final    GFRAA 05/03/2022 >60.0  >60.0 Final    GFRNA 05/03/2022 >60.0  >60.0 Final    TSH 05/03/2022 2.80  0.27 - 4.20 mcU/mL Final    VITAMIN D, 25-HYDROXY 05/03/2022 41.8  32.0 - 100.0 ng/mL Final       Calculated 10 year ASCVD risk score: 0.9 %    Health Maintenance:  Screening:    Mammogram: mother with DCIS; has not had baseline mammogram performed   PAP smear: well woman exam and pap smear (2/2019) negative. Due 2/2022. Colorectal: N/A   Depression: none   DM (HbA1c/FPG): FPG 81 (5/2022)   Hepatitis C: negative (5/2022)   Falls: none   DEXA: N/A   Glaucoma: unknown   Smoking: none   Vitamin D: 41.8 (5/2022)   Medicare Wellness: N/A        Impression:  Patient Active Problem List   Diagnosis Code    Allergic rhinitis J30.9    ADD (attention deficit disorder) F98.8    Raynaud's phenomenon I73.00    Positive ALIS (antinuclear antibody) R76.8    Chronic low back pain M54.50, G89.29    Vitamin D deficiency E55.9    Anxiety F41.9    Panic disorder F41.0    Iron deficiency anemia due to chronic blood loss D50.0    Primary hypertension I10    Paroxysmal SVT (supraventricular tachycardia) (HCC) I47.1    Menorrhagia with regular cycle N92.0    History of migraine headaches Z86.69    Overweight (BMI 25.0-29. 9) E66.3       Plan:  1.  Paroxysmal SVT.  Reports long history of intermittent palpitations for many years and reports that she did have a Holter monitor in the past which was negative. On 9/18/2021, she developed palpitations with lightheadedness/presyncope, and recorded on her apple watch a narrow complex tachycardia at rate of 230 bpm.  Reports spontaneously reverted to sinus rhythm and presented to [facility] ED for evaluation. Labs remarkable for microcytic anemia (Hb 11.4/HCT 37.8 MCV 75.4) and normal electrolytes; EKG showed sinus rhythm at 99 bpm.  Patient was started on metoprolol succinate 12.5 mg daily and referred to follow-up with cardiology. However she reports today that she did not continue metoprolol after completing the 30-day prescription and never scheduled with cardiology. She has not had a recurrence of palpitations since the episode but wishing to undergo evaluation by cardiology given long history of issues. Aware that treatment with Adderall for ADHD may be exacerbating symptoms. Will place referral to Dr. Rosa Moore. 2. Hypertension. Blood pressure elevated today in office and similar elevated readings in the ED in 9/2021. Prescribed metoprolol succinate 12.5 mg daily on discharge but did not renew prescription after completed 30 days. Given episode of paroxysmal SVT and elevated blood pressure, will restart metoprolol succinate at 25 mg daily and advised patient to monitor blood pressure at home for response. Renal function is normal with creatinine 0.8/eGFR > 60. Will reassess at next visit. 3. Hyperlipidemia. Calculated 10 year ASCVD risk is 0.9%, which does not place her in one of the four statin benefit groups as per new AHA/ACC guidelines. Some mild worsening of LDL to 110 and HDL 54 which is near goal of <100. Recommended to continue with heart healthy diet and advised increase in exercise. 4. Iron deficiency anemia. Patient reports monthly menstrual cycle with heavy bleeding with clots for 4 days.   Evidence of anemia today with Hb 11.0/HCT 39.0 and iron studies showing ferritin 5 and transferrin 10% consistent with severe iron deficiency. Advised to begin ferrous sulfate 325 mg daily. Referral placed for Dr. Nelson Kim for evaluation of menorrhagia as the cause of iron deficiency anemia. 5. Anxiety with panic disorder. Reported increase in frequency and severity of panic attacks including symptoms of hand tremor, rocking and difficulty breathing. Also reported an overall increase in anxiety symptoms. Started on Zoloft 25 mg daily in 5/2021 and reports overall improvement today although still experiencing some anxiety. Will increase dose of Zoloft to 50 mg daily. Will reassess at next visit for response. 6. Attention deficit disorder. Continue Adderall. Discussed that elevated blood pressure and anxiety may be exacerbated by use of Adderall but patient feels benefits outweigh risks. Will continue to address. 7. Allergic rhinitis. Symptoms well controlled on Singulair, Zyrtec, and Nasacort. Follow. 8. History of migraine headaches. Patient reports occurring 1-2 times per month and responds readily to Excedrin Migraine. Initiation of metoprolol for hypertension and SVT may also be beneficial in controlling migraine headaches. Will continue to monitor. 9. Vitamin D deficiency. Very low level in 2/2018 at 4.8. Treatment with ergocalciferol and improved to low normal (30.8) at last visit in 5/2021. Continuing on maintenance dose supplement of 2000 U daily with normalization of level today. Advised to continue. 10. Chronic low back pain. Intermittent long standing symptoms involving her lower back with sciatica. MRI lumbar from 2009 without significant disease to explain symptoms. Described pain particularly in sacroiliac joints, and also in her ribs and neck. Labs (10/2017) including HLA-B27, ESR, CRP, RF, and CCP negative.  Referred to Dr. Tarik Leon but due to prior no-shows at their office, was unwilling to schedule her for an appointment. Patient without significant complaints today. 11. History of positive ALIS. Strongly positive at 1:1280 speckled pattern and scleroderma Ab positive at 4.8 (9/2017). Unclear significance but currently without symptoms. Will continue to monitor. 12. Overweight. Weight increased 11 pounds since last visit in 5/2021. BMI 25.25. Emphasized importance of lifestyle modifications, including heart healthy diet and regular exercise. 13. Health maintenance. Received the first dose of the [place] COVID-19 vaccine yesterday and advised to proceed with completion of series according to [facility] recommendations. Other immunizations up to date. Vitamin D level normalized today on maintenance dose supplement. Well woman exam and pap smear up to date, but will refer to gynecology given menorrhagia and iron deficiency anemia. Referral placed for Dr. Yan Biggs. Will order again today baseline mammogram given family history of breast cancer in her mother. Urged to schedule. Emphasized importance of lifestyle modifications, including heart healthy diet, regular exercise, and weight loss. Patient understands recommendations and agrees with plan. Follow-up in 3 months.

## 2022-05-23 DIAGNOSIS — F98.8 ATTENTION DEFICIT DISORDER, UNSPECIFIED HYPERACTIVITY PRESENCE: ICD-10-CM

## 2022-05-23 RX ORDER — DEXTROAMPHETAMINE SACCHARATE, AMPHETAMINE ASPARTATE MONOHYDRATE, DEXTROAMPHETAMINE SULFATE AND AMPHETAMINE SULFATE 6.25; 6.25; 6.25; 6.25 MG/1; MG/1; MG/1; MG/1
25 CAPSULE, EXTENDED RELEASE ORAL
Qty: 30 CAPSULE | Refills: 0 | Status: SHIPPED | OUTPATIENT
Start: 2022-05-23 | End: 2022-06-27 | Stop reason: SDUPTHER

## 2022-05-23 NOTE — TELEPHONE ENCOUNTER
[facility] for [facility]der[facility] XR as 4/27/22 for a 30 d/s. Last Visit: 5/11/22 with MD Bushra Juárez  Next Appointment: 8/18/22 with MD Bushra Juárez  Previous Refill Encounter(s): 4/27/22 #30    Requested Prescriptions     Pending Prescriptions Disp Refills    amphetamine-dextroamphetamine XR (Adderall XR) 25 mg XR capsule 30 Capsule 0     Sig: Take 1 Capsule by mouth every morning. Max Daily Amount: 25 mg.  Indications: attention deficit disorder with hyperactivity

## 2022-06-24 RX ORDER — ACETAMINOPHEN 500 MG
TABLET ORAL
Qty: 90 CAPSULE | Refills: 3 | Status: SHIPPED | OUTPATIENT
Start: 2022-06-24

## 2022-06-24 NOTE — TELEPHONE ENCOUNTER
Last Visit: 5/11/22 with MD Huber Bajwa  Next Appointment: 8/18/22 with MD Craven  Previous Refill Encounter(s): 12/22/21 #90 with 1 refill    Requested Prescriptions     Pending Prescriptions Disp Refills    cholecalciferol (VITAMIN D3) (2,000 UNITS /50 MCG) cap capsule [Pharmacy Med Name: VITAMIN D3 (KHAI) 2,000IU CAP] 90 Capsule 3     Sig: Take 1 capsule by mouth once daily         For Pharmacy [id number] in place:    Recommendation Provided To:    Intervention Detail: New Rx: 1, reason: Patient Preference   Gap Closed?:    Intervention Accepted By:   Grady Time Spent (min): 5

## 2022-06-27 DIAGNOSIS — F98.8 ATTENTION DEFICIT DISORDER, UNSPECIFIED HYPERACTIVITY PRESENCE: ICD-10-CM

## 2022-06-27 RX ORDER — DEXTROAMPHETAMINE SACCHARATE, AMPHETAMINE ASPARTATE MONOHYDRATE, DEXTROAMPHETAMINE SULFATE AND AMPHETAMINE SULFATE 6.25; 6.25; 6.25; 6.25 MG/1; MG/1; MG/1; MG/1
25 CAPSULE, EXTENDED RELEASE ORAL
Qty: 30 CAPSULE | Refills: 0 | Status: SHIPPED | OUTPATIENT
Start: 2022-06-27 | End: 2022-07-24 | Stop reason: SDUPTHER

## 2022-06-27 NOTE — TELEPHONE ENCOUNTER
[facility] for Adderall XR as 5/28/22 for a 30 d/s. Last Visit: 5/11/22 with MD Mary Owens  Next Appointment: 8/1/22 with MD Mary Owens  Previous Refill Encounter(s): 5/23/22 #30    Requested Prescriptions     Pending Prescriptions Disp Refills    amphetamine-dextroamphetamine XR (Adderall XR) 25 mg XR capsule 30 Capsule 0     Sig: Take 1 Capsule by mouth every morning. Max Daily Amount: 25 mg.  Indications: attention deficit disorder with hyperactivity         For Pharmacy [id number] in place:    Recommendation Provided To:    Intervention Detail: New Rx: 1, reason: Patient Preference   Gap Closed?:    Intervention Accepted By:   Grady Time Spent (min): 5

## 2022-07-08 ENCOUNTER — VIRTUAL VISIT (OUTPATIENT)
Dept: INTERNAL MEDICINE CLINIC | Age: 42
End: 2022-07-08
Payer: COMMERCIAL

## 2022-07-08 DIAGNOSIS — G50.0 TRIGEMINAL NEURALGIA: Primary | ICD-10-CM

## 2022-07-08 PROCEDURE — 99214 OFFICE O/P EST MOD 30 MIN: CPT | Performed by: INTERNAL MEDICINE

## 2022-07-08 RX ORDER — CARBAMAZEPINE 200 MG/1
TABLET ORAL
Qty: 7 TABLET | Refills: 0 | Status: SHIPPED | OUTPATIENT
Start: 2022-07-08 | End: 2022-07-13 | Stop reason: SDUPTHER

## 2022-07-08 NOTE — PROGRESS NOTES
1. \"Have you been to the ER, urgent care clinic since your last visit? Hospitalized since your last visit? \" No    2. \"Have you seen or consulted any other health care providers outside of the [address] [place] [address] since your last visit? \" No     3. For patients aged 39-70: Has the patient had a colonoscopy / FIT/ Cologuard? NA - based on age      If the patient is female:    4. For patients aged 41-77: Has the patient had a mammogram within the past 2 years? No      5. For patients aged 21-65: Has the patient had a pap smear? Yes - Care Gap present.  Most recent result on file

## 2022-07-09 NOTE — PROGRESS NOTES
Aleja Hong is a 40 y.o. female who was seen by synchronous (real-time) audio-video technology on 7/8/2022 for Sinus Pain        Assessment & Plan:   Diagnoses and all orders for this visit:    1. Trigeminal neuralgia  Comments:  more suggestive of this vs sinus infection. Carbamazepine 200 mg 1/2/d x2d> 1/d. Aware will need labs if stays on med. Orders:  -     carBAMazepine (TEGretol) 200 mg tablet; 1/2 po daily for 2 days, then 1 po daily          712  Subjective:     Leonarda Soriano is seen for 1 week of severe pain in left temple, face, and lower jawline. Wonders if a sinus infection. She reports the pain is so severe she can't sleep, can't get comfortable. No rash, no other URI symptoms. Was unable to active camera, so telephone visit only done. Prior to Admission medications    Medication Sig Start Date End Date Taking? Authorizing Provider   carBAMazepine (TEGretol) 200 mg tablet 1/2 po daily for 2 days, then 1 po daily 7/8/22  Yes Arielle Sarah MD   amphetamine-dextroamphetamine XR (Adderall XR) 25 mg XR capsule Take 1 Capsule by mouth every morning. Max Daily Amount: 25 mg. Indications: attention deficit disorder with hyperactivity 6/27/22  Yes Ivelisse Velez MD   cholecalciferol (VITAMIN D3) (2,000 UNITS /50 MCG) cap capsule Take 1 capsule by mouth once daily 6/24/22  Yes Ivelisse Velez MD   sertraline (ZOLOFT) 50 mg tablet Take 1 Tablet by mouth daily. Indications: panic disorder 5/11/22  Yes Ivelisse Vleez MD   montelukast (SINGULAIR) 10 mg tablet Take 1 Tablet by mouth daily. 2/23/22  Yes Ivelisse Velez MD   fluticasone propionate (Flonase Allergy Relief) 50 mcg/actuation nasal spray 2 Sprays by Both Nostrils route daily. Yes Provider, Historical   cetirizine (ZYRTEC) 10 mg tablet Take  by mouth. Yes Provider, Historical   metoprolol succinate (TOPROL-XL) 25 mg XL tablet Take 1 Tablet by mouth daily.  Indications: high blood pressure, migraine prevention, paroxysmal supraventricular tachycardia  Patient not taking: Reported on 7/8/2022 5/11/22   Komal Brito MD   ferrous sulfate 325 mg (65 mg iron) tablet Take 1 Tablet by mouth Daily (before breakfast). Indications: anemia from inadequate iron  Patient not taking: Reported on 7/8/2022 5/11/22   Komal Brito MD         Review of Systems   Constitutional: Negative for chills and fever. HENT: Negative for congestion and sore throat. No loss of taste or smell   Eyes: Negative for blurred vision. Respiratory: Negative for cough and shortness of breath. Cardiovascular: Negative for chest pain. Gastrointestinal: Negative for diarrhea, nausea and vomiting. Musculoskeletal: Negative for myalgias. Skin: Negative for rash. Neurological: Negative for headaches. Objective:     Patient-Reported Vitals 7/8/2022   Patient-Reported Weight 168lbs   Patient-Reported Pulse 82   Patient-Reported Temperature 97.4   Patient-Reported SpO2 99   Patient-Reported Systolic  609   Patient-Reported Diastolic 84   Patient-Reported Peak Flow n/a      General: alert, cooperative, no distress   Mental  status: normal mood, behavior, speech, able to follow commands   HENT:    Neck:    Resp: no respiratory distress   Neuro:    Skin:    Psychiatric: normal affect, consistent with stated mood, no evidence of hallucinations     Additional exam findings:     Speaks full sentences easily      We discussed the expected course, resolution and complications of the diagnosis(es) in detail. Medication risks, benefits, costs, interactions, and alternatives were discussed as indicated. I advised her to contact the office if her condition worsens, changes or fails to improve as anticipated. She expressed understanding with the diagnosis(es) and plan. James Reyes, was evaluated through a synchronous (real-time) audio-video encounter. The patient (or guardian if applicable) is aware that this is a billable service.  Verbal consent to proceed has been obtained within the past 12 months. The visit was conducted pursuant to the emergency declaration under the [address] [place] [address] and the [employer] and [employer] General Act. Patient identification was verified, and a caregiver was present when appropriate. The patient was located in a state where the provider was credentialed to provide care.     Ino Scott MD

## 2022-07-12 ENCOUNTER — TELEPHONE (OUTPATIENT)
Dept: INTERNAL MEDICINE CLINIC | Age: 42
End: 2022-07-12

## 2022-07-12 DIAGNOSIS — Z51.81 THERAPEUTIC DRUG MONITORING: Primary | ICD-10-CM

## 2022-07-12 DIAGNOSIS — G50.0 TRIGEMINAL NEURALGIA: ICD-10-CM

## 2022-07-12 NOTE — TELEPHONE ENCOUNTER
Patient states she spoke with the provider over the weekend regarding a sinus infection. The provider put her on medication and was told if she was to take the medication she would need to get labs, however the labs are not ordered. The patient stated she wanted to come in tomorrow to complete the labs.

## 2022-07-13 DIAGNOSIS — G50.0 TRIGEMINAL NEURALGIA: ICD-10-CM

## 2022-07-13 RX ORDER — CARBAMAZEPINE 200 MG/1
200 TABLET ORAL DAILY
Qty: 30 TABLET | Refills: 0 | Status: SHIPPED | OUTPATIENT
Start: 2022-07-13 | End: 2022-07-29 | Stop reason: SDUPTHER

## 2022-07-13 NOTE — TELEPHONE ENCOUNTER
Last Visit: 5/11/22 with MD Damien Sen  Next Appointment: 8/17/22 with MD Damien Sen  Previous Refill Encounter(s): 7/8/22 #7    Requested Prescriptions     Pending Prescriptions Disp Refills    carBAMazepine (TEGretol) 200 mg tablet 30 Tablet 0     Sig: Take 1 Tablet by mouth daily.          For Yan Piña in place:    Recommendation Provided To:    Intervention Detail: New Rx: 1, reason: Patient Preference   Gap Closed?:    Intervention Accepted By:   Brittany Bailey Time Spent (min): 5

## 2022-07-15 DIAGNOSIS — Z51.81 THERAPEUTIC DRUG MONITORING: ICD-10-CM

## 2022-07-15 DIAGNOSIS — G50.0 TRIGEMINAL NEURALGIA: ICD-10-CM

## 2022-07-24 DIAGNOSIS — F98.8 ATTENTION DEFICIT DISORDER, UNSPECIFIED HYPERACTIVITY PRESENCE: ICD-10-CM

## 2022-07-26 RX ORDER — DEXTROAMPHETAMINE SACCHARATE, AMPHETAMINE ASPARTATE MONOHYDRATE, DEXTROAMPHETAMINE SULFATE AND AMPHETAMINE SULFATE 6.25; 6.25; 6.25; 6.25 MG/1; MG/1; MG/1; MG/1
25 CAPSULE, EXTENDED RELEASE ORAL
Qty: 30 CAPSULE | Refills: 0 | Status: SHIPPED | OUTPATIENT
Start: 2022-07-26 | End: 2022-08-30 | Stop reason: SDUPTHER

## 2022-07-26 NOTE — TELEPHONE ENCOUNTER
Lennyhart request for refill Adderall. No Access to . Thanks, Rhonda    Last Visit: AMMY 7/8/22 Joseline, 5/11/22 Merissa  Next Appointment: 8/17/22 MD Craven  Previous Refill Encounter(s): 6/27/22 30    Requested Prescriptions     Pending Prescriptions Disp Refills    amphetamine-dextroamphetamine XR (Adderall XR) 25 mg XR capsule 30 Capsule 0     Sig: Take 1 Capsule by mouth every morning. Max Daily Amount: 25 mg. Indications: attention deficit disorder with hyperactivity     For Pharmacy Admin Tracking Only    CPA in place:   Recommendation Provided To:    Intervention Detail: New Rx: 1, reason: Patient Preference  Gap Closed?:   Intervention Accepted By:   Time Spent (min): 5

## 2022-07-29 ENCOUNTER — APPOINTMENT (OUTPATIENT)
Dept: INTERNAL MEDICINE CLINIC | Age: 42
End: 2022-07-29

## 2022-07-29 DIAGNOSIS — G50.0 TRIGEMINAL NEURALGIA: ICD-10-CM

## 2022-07-30 LAB
A-G RATIO,AGRAT: 1.7 RATIO (ref 1.1–2.6)
ABSOLUTE LYMPHOCYTE COUNT, 10803: 1.5 K/UL (ref 1–4.8)
ALBUMIN SERPL-MCNC: 4.5 G/DL (ref 3.5–5)
ALP SERPL-CCNC: 113 U/L (ref 25–115)
ALT SERPL-CCNC: 17 U/L (ref 5–40)
ANION GAP SERPL CALC-SCNC: 13 MMOL/L (ref 3–15)
AST SERPL W P-5'-P-CCNC: 17 U/L (ref 10–37)
BASOPHILS # BLD: 0.1 K/UL (ref 0–0.2)
BASOPHILS NFR BLD: 1 % (ref 0–2)
BILIRUB SERPL-MCNC: 0.2 MG/DL (ref 0.2–1.2)
BUN SERPL-MCNC: 9 MG/DL (ref 6–22)
CALCIUM SERPL-MCNC: 9.1 MG/DL (ref 8.4–10.5)
CARBAMAZEPINE SERPL-MCNC: 7 MCG/ML (ref 4–12)
CHLORIDE SERPL-SCNC: 99 MMOL/L (ref 98–110)
CO2 SERPL-SCNC: 26 MMOL/L (ref 20–32)
CREAT SERPL-MCNC: 0.8 MG/DL (ref 0.5–1.2)
EOSINOPHIL # BLD: 0.1 K/UL (ref 0–0.5)
EOSINOPHIL NFR BLD: 2 % (ref 0–6)
ERYTHROCYTE [DISTWIDTH] IN BLOOD BY AUTOMATED COUNT: 16.6 % (ref 10–15.5)
GLOBULIN,GLOB: 2.7 G/DL (ref 2–4)
GLOMERULAR FILTRATION RATE: >60 ML/MIN/1.73 SQ.M.
GLUCOSE SERPL-MCNC: 119 MG/DL (ref 70–99)
GRANULOCYTES,GRANS: 71 % (ref 40–75)
HCT VFR BLD AUTO: 43.4 % (ref 35.1–46.5)
HGB BLD-MCNC: 12.6 G/DL (ref 11.7–15.5)
LYMPHOCYTES, LYMLT: 23 % (ref 20–45)
MCH RBC QN AUTO: 25 PG (ref 26–34)
MCHC RBC AUTO-ENTMCNC: 29 G/DL (ref 31–36)
MCV RBC AUTO: 86 FL (ref 80–99)
MONOCYTES # BLD: 0.2 K/UL (ref 0.1–1)
MONOCYTES NFR BLD: 4 % (ref 3–12)
NEUTROPHILS # BLD AUTO: 4.6 K/UL (ref 1.8–7.7)
PLATELET # BLD AUTO: 286 K/UL (ref 140–440)
PMV BLD AUTO: 10.6 FL (ref 9–13)
POTASSIUM SERPL-SCNC: 3.9 MMOL/L (ref 3.5–5.5)
PROT SERPL-MCNC: 7.2 G/DL (ref 6.4–8.3)
RBC # BLD AUTO: 5.06 M/UL (ref 3.8–5.2)
SODIUM SERPL-SCNC: 138 MMOL/L (ref 133–145)
WBC # BLD AUTO: 6.5 K/UL (ref 4–11)

## 2022-08-01 RX ORDER — CARBAMAZEPINE 200 MG/1
200 TABLET ORAL 2 TIMES DAILY
Qty: 60 TABLET | Refills: 2 | Status: SHIPPED | OUTPATIENT
Start: 2022-08-01 | End: 2022-08-29 | Stop reason: SDUPTHER

## 2022-08-01 NOTE — TELEPHONE ENCOUNTER
Patient states this was increased to 2/day. Please advise    Last Visit: 5/11/22 with MD Yamel Connolly  Next Appointment: 8/17/22 with MD Yamel Connolly    Requested Prescriptions     Pending Prescriptions Disp Refills    carBAMazepine (TEGretol) 200 mg tablet  0     Sig: Take  by mouth daily. For [id number] in place:   Recommendation Provided To:    Intervention Detail: New Rx: 1, reason: Patient Preference  Gap Closed?:   Intervention Accepted By:   Time Spent (min): 5

## 2022-08-06 ENCOUNTER — HOSPITAL ENCOUNTER (EMERGENCY)
Age: 42
Discharge: HOME | End: 2022-08-07
Attending: EMERGENCY MEDICINE
Payer: COMMERCIAL

## 2022-08-06 DIAGNOSIS — G50.0 TRIGEMINAL NEURALGIA: Primary | ICD-10-CM

## 2022-08-06 PROCEDURE — 99284 EMERGENCY DEPT VISIT MOD MDM: CPT

## 2022-08-07 VITALS
SYSTOLIC BLOOD PRESSURE: 139 MMHG | RESPIRATION RATE: 16 BRPM | TEMPERATURE: 98.7 F | HEIGHT: 69 IN | HEART RATE: 80 BPM | DIASTOLIC BLOOD PRESSURE: 81 MMHG | WEIGHT: 165 LBS | OXYGEN SATURATION: 100 % | BODY MASS INDEX: 24.44 KG/M2

## 2022-08-07 PROCEDURE — 74011250636 HC RX REV CODE- 250/636: Performed by: EMERGENCY MEDICINE

## 2022-08-07 PROCEDURE — 96372 THER/PROPH/DIAG INJ SC/IM: CPT

## 2022-08-07 RX ORDER — MORPHINE SULFATE 2 MG/ML
2 INJECTION, SOLUTION INTRAMUSCULAR; INTRAVENOUS
Status: COMPLETED | OUTPATIENT
Start: 2022-08-07 | End: 2022-08-07

## 2022-08-07 RX ADMIN — MORPHINE SULFATE 2 MG: 2 INJECTION, SOLUTION INTRAMUSCULAR; INTRAVENOUS at 00:46

## 2022-08-07 NOTE — ED TRIAGE NOTES
Patient states she did not have her medications for her trigeminal neuralgia last weekend. Started taking her medications with an extra dose starting Monday to get back under control. Patient states she went to [facility] ER and told she needed to take her medication as prescribed and tonight is having break-through pain. Pain starts on side of her head, radiates to her left side sinus area and back down her jaw to her neck.

## 2022-08-07 NOTE — ED PROVIDER NOTES
EMERGENCY DEPARTMENT HISTORY AND PHYSICAL EXAM    1:16 AM      Date: 8/6/2022  Patient Name: Lacey Dasilva    History of Presenting Illness     Chief Complaint   Patient presents with    Headache         History Provided By: patient    Location/Duration/Severity/Modifying factors     HPI  Patient is a 39 yo female who states she did not have her medications for her trigeminal neuralgia last weekend. She developed a headache. She was started back on her medication and has been taking her medications with an extra dose starting Monday to get back under control. Patient states she went to [facility] 2 days ago  and  was told she needed to take her medication as prescribed . She then started back on her neurology medical and tonight is having break-through pain. Pain starts on side of her head, radiates to her left side sinus area and back down her jaw to her neck. PCP: Flora Galloway MD    Current Outpatient Medications   Medication Sig Dispense Refill    carBAMazepine (TEGretol) 200 mg tablet Take 1 Tablet by mouth two (2) times a day. Indications: facial nerve pain 60 Tablet 2    amphetamine-dextroamphetamine XR (Adderall XR) 25 mg XR capsule Take 1 Capsule by mouth every morning. Max Daily Amount: 25 mg. Indications: attention deficit disorder with hyperactivity 30 Capsule 0    cholecalciferol (VITAMIN D3) (2,000 UNITS /50 MCG) cap capsule Take 1 capsule by mouth once daily 90 Capsule 3    sertraline (ZOLOFT) 50 mg tablet Take 1 Tablet by mouth daily. Indications: panic disorder 90 Tablet 3    montelukast (SINGULAIR) 10 mg tablet Take 1 Tablet by mouth daily. 90 Tablet 2    fluticasone propionate (FLONASE) 50 mcg/actuation nasal spray 2 Sprays by Both Nostrils route daily. cetirizine (ZYRTEC) 10 mg tablet Take  by mouth.      metoprolol succinate (TOPROL-XL) 25 mg XL tablet Take 1 Tablet by mouth daily.  Indications: high blood pressure, migraine prevention, paroxysmal supraventricular tachycardia (Patient not taking: No sig reported) 90 Tablet 3    ferrous sulfate 325 mg (65 mg iron) tablet Take 1 Tablet by mouth Daily (before breakfast). Indications: anemia from inadequate iron (Patient not taking: No sig reported) 90 Tablet 1       Past History     Past Medical History:  Past Medical History:   Diagnosis Date    ADD (attention deficit disorder)     Allergic rhinitis     Chronic low back pain     Palpitations     Positive ALIS (antinuclear antibody) 05/2017    PVC's (premature ventricular contractions)     Raynaud's phenomenon        Past Surgical History:  No past surgical history on file. Family History:  Family History   Problem Relation Age of Onset    Hypertension Mother        Social History:  Social History     Tobacco Use    Smoking status: Never    Smokeless tobacco: Never   Vaping Use    Vaping Use: Never used   Substance Use Topics    Alcohol use: Yes     Alcohol/week: 1.0 standard drink     Types: 1 Shots of liquor per week     Comment: 2 monthly    Drug use: No       Allergies: Allergies   Allergen Reactions    Cephalosporins Shortness of Breath and Rash         Review of Systems     Review of Systems   Constitutional: Negative. HENT: Negative. Eyes: Negative. Respiratory: Negative. Cardiovascular: Negative. Gastrointestinal: Negative. Endocrine: Negative. Genitourinary: Negative. Musculoskeletal: Negative. Skin: Negative. Allergic/Immunologic: Negative. Neurological:  Positive for headaches. Hematological: Negative. Psychiatric/Behavioral: Negative. All other systems reviewed and are negative. Physical Exam   Visit Vitals  /81 (BP 1 Location: Left arm)   Pulse 82   Temp 98.7 °F (37.1 °C)   Resp 16   Ht 5' 9\" (1.753 m)   Wt 74.8 kg (165 lb)   SpO2 100%   BMI 24.37 kg/m²       Physical Exam  Vitals and nursing note reviewed. Constitutional:       General: She is not in acute distress. Appearance: She is well-developed.  She is not diaphoretic. HENT:      Head: Normocephalic. Right Ear: External ear normal.      Left Ear: External ear normal.      Mouth/Throat:      Pharynx: No oropharyngeal exudate. Eyes:      General: No scleral icterus. Right eye: No discharge. Left eye: No discharge. Conjunctiva/sclera: Conjunctivae normal.      Pupils: Pupils are equal, round, and reactive to light. Neck:      Thyroid: No thyromegaly. Vascular: No JVD. Trachea: No tracheal deviation. Cardiovascular:      Rate and Rhythm: Normal rate and regular rhythm. Heart sounds: Normal heart sounds. No murmur heard. No friction rub. No gallop. Pulmonary:      Effort: Pulmonary effort is normal. No respiratory distress. Breath sounds: Normal breath sounds. No stridor. No wheezing or rales. Chest:      Chest wall: No tenderness. Abdominal:      General: Bowel sounds are normal. There is no distension. Palpations: Abdomen is soft. There is no mass. Tenderness: There is no abdominal tenderness. There is no guarding or rebound. Musculoskeletal:         General: No tenderness. Normal range of motion. Cervical back: Normal range of motion and neck supple. Lymphadenopathy:      Cervical: No cervical adenopathy. Skin:     General: Skin is warm and dry. Coloration: Skin is not pale. Findings: No erythema or rash. Neurological:      General: No focal deficit present. Mental Status: She is alert and oriented to person, place, and time. Cranial Nerves: No cranial nerve deficit. Sensory: No sensory deficit. Motor: No weakness or abnormal muscle tone. Coordination: Coordination normal.      Gait: Gait normal.      Deep Tendon Reflexes: Reflexes normal.         Diagnostic Study Results     Labs -  Medical Decision Making   I am the first provider for this patient.     I reviewed the vital signs, available nursing notes, past medical history, past surgical history, family history and social history. Vital Signs-Reviewed the patient's vital signs. Records Reviewed:  by me    ED Course: Progress Notes, Reevaluation, and Consults:         Provider Notes (Medical Decision Making):    Diagnosis: Trigeminal neuralgia, tension headache, cluster headache, migraine headache  MDM      Procedures      Diagnosis     Clinical Impression: Trigeminal neuralgia    Disposition: DC home. Follow-up with her neurologist or PCP within the next 2 days for medication adjustment. Return ER needed. Continue on her current medication for her trigeminal neuralgia. Follow-up Information    None          Patient's Medications   Start Taking    No medications on file   Continue Taking    AMPHETAMINE-DEXTROAMPHETAMINE XR (ADDERALL XR) 25 MG XR CAPSULE    Take 1 Capsule by mouth every morning. Max Daily Amount: 25 mg. Indications: attention deficit disorder with hyperactivity    CARBAMAZEPINE (TEGRETOL) 200 MG TABLET    Take 1 Tablet by mouth two (2) times a day. Indications: facial nerve pain    CETIRIZINE (ZYRTEC) 10 MG TABLET    Take  by mouth. CHOLECALCIFEROL (VITAMIN D3) (2,000 UNITS /50 MCG) CAP CAPSULE    Take 1 capsule by mouth once daily    FERROUS SULFATE 325 MG (65 MG IRON) TABLET    Take 1 Tablet by mouth Daily (before breakfast). Indications: anemia from inadequate iron    FLUTICASONE PROPIONATE (FLONASE) 50 MCG/ACTUATION NASAL SPRAY    2 Sprays by Both Nostrils route daily. METOPROLOL SUCCINATE (TOPROL-XL) 25 MG XL TABLET    Take 1 Tablet by mouth daily. Indications: high blood pressure, migraine prevention, paroxysmal supraventricular tachycardia    MONTELUKAST (SINGULAIR) 10 MG TABLET    Take 1 Tablet by mouth daily. SERTRALINE (ZOLOFT) 50 MG TABLET    Take 1 Tablet by mouth daily.  Indications: panic disorder   These Medications have changed    No medications on file   Stop Taking    No medications on file     Disclaimer: Sections of this note are dictated using utilizing voice recognition software. Minor typographical errors may be present. If questions arise, please do not hesitate to contact me or call our department.

## 2022-08-07 NOTE — TELEPHONE ENCOUNTER
TC from pt's  last night @ 9:07 pm, pt has severe facial pain due to her Trigeminal Neuralgia, he stated that he just gave her the evening dose of Tegretol 200 mg, advised him to wait up to an hour and take pt to the ER if she is still having severe pain.

## 2022-08-07 NOTE — ROUTINE PROCESS
Kirill López is a 40 y.o. female that was discharged in stable. Pt was accompanied by partner. Pt is not driving. The patients diagnosis, condition and treatment were explained to  patient and aftercare instructions were given. The patient verbalized understanding. Patient armband removed and shredded.

## 2022-08-09 ENCOUNTER — TELEPHONE (OUTPATIENT)
Dept: INTERNAL MEDICINE CLINIC | Age: 42
End: 2022-08-09

## 2022-08-09 DIAGNOSIS — R51.9 WORSENING HEADACHES: ICD-10-CM

## 2022-08-09 DIAGNOSIS — G50.0 TRIGEMINAL NEURALGIA OF LEFT SIDE OF FACE: ICD-10-CM

## 2022-08-09 DIAGNOSIS — G50.0 TRIGEMINAL NEURALGIA OF LEFT SIDE OF FACE: Primary | ICD-10-CM

## 2022-08-09 DIAGNOSIS — D50.0 IRON DEFICIENCY ANEMIA DUE TO CHRONIC BLOOD LOSS: ICD-10-CM

## 2022-08-09 DIAGNOSIS — E55.9 VITAMIN D DEFICIENCY: ICD-10-CM

## 2022-08-09 DIAGNOSIS — I10 PRIMARY HYPERTENSION: ICD-10-CM

## 2022-08-09 NOTE — TELEPHONE ENCOUNTER
Pt's  request return call from nurse Jones Angeles as soon as possible. Pt has been to ED twice 08/06/22 to [facility] and last night 08/08/22 to [facility]. Stated re: meds for neuralgia.  Said issue with break thru pain

## 2022-08-09 NOTE — TELEPHONE ENCOUNTER
Spoke with patients  he wanted to discuss the recent ED visits that patient has had. She has been at the ED ON 8/3,8/6, and today due to her trigeminal neuralgia. Patients  reports her pain is worse and she is having more break through pain.  is wanting to know if there is anything else she can take for the break through pain. He would like to avoid having to repeatedly take her to the ED for the pain. She is taking tegretol BID but she is having increased pain in between doses some evenings she has taken the second dose earlier to combat pain but it has not been effective.  reports ED recommended she establish with a neurologist. They set her up with a rosalie specialist whose first available is may 2023.  has called [facility] neurology and was told they have appointments available in November but she would need a referral.  is also will to contact other neurologist and see if she can be seen sooner but he wants recommendations from Becky.

## 2022-08-09 NOTE — TELEPHONE ENCOUNTER
Called and spoke with patient's . Reports that patient has had worsening facial and head pain since 8/3/2022 requiring 3 visits to the ED due to poorly controlled pain on carbamazepine 200 mg twice daily. Reporting accelerated breakthrough pain mainly in the evening prior to evening dose of carbamazepine and not helped once taken. Head CT scan was performed at [facility] today and was negative for acute findings. Prescribed Fioricet and Compazine as concern for possible migraine component to headache. Discussed the following with patient's :  1. Titrate carbamazepine dose to 300 mg twice daily for the next 24 hours and if no improvement may increase to 400 mg twice daily. 2.  Will schedule brain MRI and MRA to rule out possible underlying structural or vascular abnormality to explain new onset severe and accelerating symptoms. 3.  Referral to neurology and in the process of scheduling. Patient has an appointment scheduled on 8/17/2022 for physical exam.  Lab work this week. Will follow-up at upcoming appointment.

## 2022-08-11 ENCOUNTER — APPOINTMENT (OUTPATIENT)
Dept: INTERNAL MEDICINE CLINIC | Age: 42
End: 2022-08-11

## 2022-08-11 DIAGNOSIS — E55.9 VITAMIN D DEFICIENCY: ICD-10-CM

## 2022-08-11 DIAGNOSIS — G50.0 TRIGEMINAL NEURALGIA OF LEFT SIDE OF FACE: ICD-10-CM

## 2022-08-11 DIAGNOSIS — R51.9 WORSENING HEADACHES: ICD-10-CM

## 2022-08-11 DIAGNOSIS — D50.0 IRON DEFICIENCY ANEMIA DUE TO CHRONIC BLOOD LOSS: ICD-10-CM

## 2022-08-11 DIAGNOSIS — I10 PRIMARY HYPERTENSION: ICD-10-CM

## 2022-08-19 ENCOUNTER — TELEPHONE (OUTPATIENT)
Dept: INTERNAL MEDICINE CLINIC | Age: 42
End: 2022-08-19

## 2022-08-19 NOTE — TELEPHONE ENCOUNTER
Spouse Abdullahi called. Royce oDtiff he needs orders for the MRI and MRA Brain faxed to [facility] at [phone]. He is hoping to get her scheduled for Monday. Printed and faxed orders    He doesn't know how the appt got missed, they thought appt  was on 8/18/22.   But he said if Dr Meilssa Hein needs to speak to them about anything to please call him at [phone]

## 2022-08-19 NOTE — TELEPHONE ENCOUNTER
Chichi from [facility] called again stating they need order MRA head can please be faxed to     [phone]    Thank you please be advise.

## 2022-08-25 ENCOUNTER — TELEPHONE (OUTPATIENT)
Dept: INTERNAL MEDICINE CLINIC | Age: 42
End: 2022-08-25

## 2022-08-25 DIAGNOSIS — G50.0 TRIGEMINAL NEURALGIA: ICD-10-CM

## 2022-08-25 NOTE — TELEPHONE ENCOUNTER
MR HEAD W/WO CONTRAST ([facility] 8/22/2022)  Impression  1. No abnormal signal or contrast enhancement on the trigeminal nerves. No evidence of mass effect in the trigeminal nerves. 2. Normal MRI of the brain with and without contrast.     MRA HEAD W/WO CONTRAST ([facility] 8/22/2022)  Impression  Normal MRA of the brain with and without contrast.       Please let the patient know that her brain MRI and MRA were normal and did not show any reason for her current trigeminal neuralgia pain. Please see if her pain has improved on the higher dose of carbamazepine and ask her what dose she is currently taking. Also please see if she has been able to schedule an appointment with neurology. Also please ask her to reschedule her physical appointment with me since she missed the appointment last week.

## 2022-08-29 RX ORDER — CARBAMAZEPINE 200 MG/1
400 TABLET ORAL 2 TIMES DAILY
Qty: 120 TABLET | Refills: 2 | Status: SHIPPED | OUTPATIENT
Start: 2022-08-29

## 2022-08-30 DIAGNOSIS — F98.8 ATTENTION DEFICIT DISORDER, UNSPECIFIED HYPERACTIVITY PRESENCE: ICD-10-CM

## 2022-08-30 RX ORDER — DEXTROAMPHETAMINE SACCHARATE, AMPHETAMINE ASPARTATE MONOHYDRATE, DEXTROAMPHETAMINE SULFATE AND AMPHETAMINE SULFATE 6.25; 6.25; 6.25; 6.25 MG/1; MG/1; MG/1; MG/1
25 CAPSULE, EXTENDED RELEASE ORAL
Qty: 30 CAPSULE | Refills: 0 | Status: SHIPPED | OUTPATIENT
Start: 2022-08-30 | End: 2022-10-01 | Stop reason: SDUPTHER

## 2022-08-30 NOTE — TELEPHONE ENCOUNTER
[facility]all [facility]27/22 for a 30 d/s. Last Visit: 5/11/22 with MD Kirstin Hoffmann  Next Appointment: Advised to follow-up in 3 months  Previous Refill Encounter(s): 7/26/22 #30    Requested Prescriptions     Pending Prescriptions Disp Refills    amphetamine-dextroamphetamine XR (Adderall XR) 25 mg XR capsule 30 Capsule 0     Sig: Take 1 Capsule by mouth every morning. Max Daily Amount: 25 mg. Indications: attention deficit disorder with hyperactivity         For Pharmacy Admin Tracking Only    CPA in place:   Recommendation Provided To:    Intervention Detail: New Rx: 1, reason: Patient Preference  Gap Closed?:   Intervention Accepted By:   Time Spent (min): 5

## 2022-08-30 NOTE — TELEPHONE ENCOUNTER
Refill for carbamazepine 400 mg bid sent to [facility]. Please request office note from JENNIE Burnett of neurology. Thanks.

## 2022-10-01 DIAGNOSIS — F98.8 ATTENTION DEFICIT DISORDER, UNSPECIFIED HYPERACTIVITY PRESENCE: ICD-10-CM

## 2022-10-03 RX ORDER — DEXTROAMPHETAMINE SACCHARATE, AMPHETAMINE ASPARTATE MONOHYDRATE, DEXTROAMPHETAMINE SULFATE AND AMPHETAMINE SULFATE 6.25; 6.25; 6.25; 6.25 MG/1; MG/1; MG/1; MG/1
25 CAPSULE, EXTENDED RELEASE ORAL
Qty: 30 CAPSULE | Refills: 0 | Status: SHIPPED | OUTPATIENT
Start: 2022-10-03 | End: 2022-10-31 | Stop reason: SDUPTHER

## 2022-10-03 NOTE — TELEPHONE ENCOUNTER
[facility]derall XR as 8/30/22 for a 30 d/s. Last Visit: 5/11/22 with MD Cassandra Moctezuma  Next Appointment: Advised to follow-up in 3 months  Previous Refill Encounter(s): 8/30/22 #30    Requested Prescriptions     Pending Prescriptions Disp Refills    amphetamine-dextroamphetamine XR (Adderall XR) 25 mg XR capsule 30 Capsule 0     Sig: Take 1 Capsule by mouth every morning. Max Daily Amount: 25 mg. Indications: attention deficit disorder with hyperactivity         For Pharmacy Admin Tracking Only    CPA in place:   Recommendation Provided To:    Intervention Detail: New Rx: 1, reason: Patient Preference  Gap Closed?:   Intervention Accepted By:   Time Spent (min): 5

## 2022-10-31 DIAGNOSIS — F98.8 ATTENTION DEFICIT DISORDER, UNSPECIFIED HYPERACTIVITY PRESENCE: ICD-10-CM

## 2022-11-01 RX ORDER — DEXTROAMPHETAMINE SACCHARATE, AMPHETAMINE ASPARTATE MONOHYDRATE, DEXTROAMPHETAMINE SULFATE AND AMPHETAMINE SULFATE 6.25; 6.25; 6.25; 6.25 MG/1; MG/1; MG/1; MG/1
25 CAPSULE, EXTENDED RELEASE ORAL
Qty: 30 CAPSULE | Refills: 0 | Status: SHIPPED | OUTPATIENT
Start: 2022-11-01

## 2022-11-01 NOTE — TELEPHONE ENCOUNTER
[facility] for [facility]derall XR as 10/3/22 for a 30 d/s. Last Visit: 7/8/22 with MD Rhina Ha  Next Appointment: none  Previous Refill Encounter(s): 10/3/22 #30    Requested Prescriptions     Pending Prescriptions Disp Refills    amphetamine-dextroamphetamine XR (Adderall XR) 25 mg XR capsule 30 Capsule 0     Sig: Take 1 Capsule by mouth every morning. Max Daily Amount: 25 mg. Indications: attention deficit disorder with hyperactivity         For Pharmacy Admin Tracking Only    CPA in place:   Recommendation Provided To:    Intervention Detail: New Rx: 1, reason: Patient Preference  Gap Closed?:   Intervention Accepted By:   Time Spent (min): 5

## 2022-12-04 DIAGNOSIS — F98.8 ATTENTION DEFICIT DISORDER, UNSPECIFIED HYPERACTIVITY PRESENCE: ICD-10-CM

## 2022-12-05 NOTE — TELEPHONE ENCOUNTER
CONTROLLED SUBSTANCE REFILL REQUEST     Last UDS-5/5/21  CSA Signed- 6/10/19       Requested Prescriptions     Pending Prescriptions Disp Refills    amphetamine-dextroamphetamine XR (Adderall XR) 25 mg XR capsule 30 Capsule 0     Sig: Take 1 Capsule by mouth every morning. Max Daily Amount: 25 mg. Indications: attention deficit disorder with hyperactivity       MEDICATION #1  Last prescription refill date: 11/1/22  Number of pills given with last prescription: 30  Maximum pills per day: 1    Last office visit: 8/17/2022  No future appointments.

## 2022-12-05 NOTE — TELEPHONE ENCOUNTER
Reviewed report generated by the [facility]. Does not demonstrate aberrancies or inconsistencies with regard to the prescribing of controlled medications to this patient by other providers. Last filled Adderall on 11/1/2022 per . No showed for her last appointment on 8/17/2022. Patient needs updated UDS and CSA before refill can be approved. Also needs to schedule follow-up appointment with labs in order to continue receiving refills of carbamazepine for her trigeminal neuralgia. 90-day refill sent to pharmacy until can be seen.

## 2022-12-09 ENCOUNTER — LAB ONLY (OUTPATIENT)
Dept: INTERNAL MEDICINE CLINIC | Age: 42
End: 2022-12-09

## 2022-12-09 DIAGNOSIS — F98.8 ATTENTION DEFICIT DISORDER OF ADULT: Primary | ICD-10-CM

## 2022-12-09 RX ORDER — DEXTROAMPHETAMINE SACCHARATE, AMPHETAMINE ASPARTATE MONOHYDRATE, DEXTROAMPHETAMINE SULFATE AND AMPHETAMINE SULFATE 6.25; 6.25; 6.25; 6.25 MG/1; MG/1; MG/1; MG/1
25 CAPSULE, EXTENDED RELEASE ORAL
Qty: 30 CAPSULE | Refills: 0 | Status: SHIPPED | OUTPATIENT
Start: 2022-12-09

## 2022-12-09 NOTE — TELEPHONE ENCOUNTER
Pt came into the office and did the ua and signed the agreement which is in the doctors box for signature.

## 2022-12-29 ENCOUNTER — HOSPITAL ENCOUNTER (OUTPATIENT)
Dept: WOMENS IMAGING | Age: 42
Discharge: HOME | End: 2022-12-29
Attending: INTERNAL MEDICINE
Payer: COMMERCIAL

## 2022-12-29 DIAGNOSIS — Z12.31 SCREENING MAMMOGRAM FOR BREAST CANCER: ICD-10-CM

## 2022-12-29 PROCEDURE — 77063 BREAST TOMOSYNTHESIS BI: CPT

## 2023-01-13 DIAGNOSIS — F98.8 ATTENTION DEFICIT DISORDER, UNSPECIFIED HYPERACTIVITY PRESENCE: ICD-10-CM

## 2023-01-13 RX ORDER — DEXTROAMPHETAMINE SACCHARATE, AMPHETAMINE ASPARTATE MONOHYDRATE, DEXTROAMPHETAMINE SULFATE AND AMPHETAMINE SULFATE 6.25; 6.25; 6.25; 6.25 MG/1; MG/1; MG/1; MG/1
25 CAPSULE, EXTENDED RELEASE ORAL
Qty: 30 CAPSULE | Refills: 0 | Status: SHIPPED | OUTPATIENT
Start: 2023-01-13

## 2023-02-17 ENCOUNTER — PATIENT MESSAGE (OUTPATIENT)
Age: 43
End: 2023-02-17

## 2023-02-17 DIAGNOSIS — F98.8 ATTENTION DEFICIT DISORDER, UNSPECIFIED HYPERACTIVITY PRESENCE: Primary | ICD-10-CM

## 2023-02-17 RX ORDER — CARBAMAZEPINE 200 MG/1
TABLET ORAL
COMMUNITY
Start: 2022-12-05

## 2023-02-17 RX ORDER — METOPROLOL SUCCINATE 25 MG/1
25 TABLET, EXTENDED RELEASE ORAL DAILY
COMMUNITY
Start: 2022-05-11

## 2023-02-17 RX ORDER — DEXTROAMPHETAMINE SACCHARATE, AMPHETAMINE ASPARTATE MONOHYDRATE, DEXTROAMPHETAMINE SULFATE AND AMPHETAMINE SULFATE 6.25; 6.25; 6.25; 6.25 MG/1; MG/1; MG/1; MG/1
25 CAPSULE, EXTENDED RELEASE ORAL EVERY MORNING
Qty: 30 CAPSULE | Refills: 0 | Status: SHIPPED | OUTPATIENT
Start: 2023-02-17 | End: 2023-03-19

## 2023-02-17 RX ORDER — CETIRIZINE HYDROCHLORIDE 10 MG/1
TABLET ORAL
COMMUNITY

## 2023-02-17 RX ORDER — RIZATRIPTAN BENZOATE 10 MG/1
TABLET ORAL
COMMUNITY
Start: 2023-02-07

## 2023-02-17 RX ORDER — FERROUS SULFATE 325(65) MG
325 TABLET ORAL
COMMUNITY
Start: 2022-05-11

## 2023-02-17 RX ORDER — DEXTROAMPHETAMINE SACCHARATE, AMPHETAMINE ASPARTATE MONOHYDRATE, DEXTROAMPHETAMINE SULFATE AND AMPHETAMINE SULFATE 6.25; 6.25; 6.25; 6.25 MG/1; MG/1; MG/1; MG/1
25 CAPSULE, EXTENDED RELEASE ORAL
COMMUNITY
Start: 2022-12-09 | End: 2023-02-17 | Stop reason: SDUPTHER

## 2023-02-17 NOTE — TELEPHONE ENCOUNTER
From: Valeria Mendez  To: Dr. Seaman Ros: 2/17/2023 11:07 AM EST  Subject: Adderall Refill    Sorry to do this this way, but with the [facility] update, it no longer lists my medications, therefore I currently cant refill them through the sendy. I need my Adderall refilled and sent to [facility] [place] pharmacy on [address] please.  Thanks so much!!

## 2023-02-17 NOTE — TELEPHONE ENCOUNTER
Reviewed report generated by the [facility]. Does not demonstrate aberrancies or inconsistencies with regard to the prescribing of controlled medications to this patient by other providers. Last filled 1/13/2023 per .

## 2023-02-19 ENCOUNTER — HOSPITAL ENCOUNTER (EMERGENCY)
Facility: HOSPITAL | Age: 43
Discharge: HOME | End: 2023-02-19
Attending: EMERGENCY MEDICINE
Payer: COMMERCIAL

## 2023-02-19 ENCOUNTER — APPOINTMENT (OUTPATIENT)
Facility: HOSPITAL | Age: 43
End: 2023-02-19
Payer: COMMERCIAL

## 2023-02-19 VITALS
OXYGEN SATURATION: 100 % | SYSTOLIC BLOOD PRESSURE: 165 MMHG | TEMPERATURE: 98.8 F | RESPIRATION RATE: 18 BRPM | DIASTOLIC BLOOD PRESSURE: 91 MMHG | HEART RATE: 69 BPM
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
## 2023-02-19 PROCEDURE — 99284 EMERGENCY DEPT VISIT MOD MDM: CPT

## 2023-02-19 PROCEDURE — 6370000000 HC RX 637 (ALT 250 FOR IP): Performed by: EMERGENCY MEDICINE

## 2023-02-19 PROCEDURE — 90714 TD VACC NO PRESV 7 YRS+ IM: CPT | Performed by: EMERGENCY MEDICINE

## 2023-02-19 PROCEDURE — 73140 X-RAY EXAM OF FINGER(S): CPT

## 2023-02-19 PROCEDURE — 90471 IMMUNIZATION ADMIN: CPT | Performed by: EMERGENCY MEDICINE

## 2023-02-19 PROCEDURE — 6360000002 HC RX W HCPCS: Performed by: EMERGENCY MEDICINE

## 2023-02-19 RX ORDER — TETANUS AND DIPHTHERIA TOXOIDS ADSORBED 2; 2 [LF]/.5ML; [LF]/.5ML
0.5 INJECTION INTRAMUSCULAR ONCE
Status: COMPLETED | OUTPATIENT
Start: 2023-02-19 | End: 2023-02-19

## 2023-02-19 RX ORDER — AMOXICILLIN AND CLAVULANATE POTASSIUM 875; 125 MG/1; MG/1
1 TABLET, FILM COATED ORAL 2 TIMES DAILY
Qty: 20 TABLET | Refills: 0 | Status: SHIPPED | OUTPATIENT
Start: 2023-02-19 | End: 2023-02-28 | Stop reason: SDDI

## 2023-02-19 RX ORDER — AMOXICILLIN AND CLAVULANATE POTASSIUM 875; 125 MG/1; MG/1
1 TABLET, FILM COATED ORAL
Status: COMPLETED | OUTPATIENT
Start: 2023-02-19 | End: 2023-02-19

## 2023-02-19 RX ORDER — BACITRACIN, NEOMYCIN, POLYMYXIN B 400; 3.5; 5 [USP'U]/G; MG/G; [USP'U]/G
OINTMENT TOPICAL ONCE
Status: COMPLETED | OUTPATIENT
Start: 2023-02-19 | End: 2023-02-19

## 2023-02-19 RX ADMIN — BACITRACIN ZINC, NEOMYCIN, POLYMYXIN B: 400; 3.5; 5 OINTMENT TOPICAL at 05:32

## 2023-02-19 RX ADMIN — AMOXICILLIN AND CLAVULANATE POTASSIUM 1 TABLET: 875; 125 TABLET, FILM COATED ORAL at 05:25

## 2023-02-19 RX ADMIN — TETANUS AND DIPHTHERIA TOXOIDS ADSORBED 0.5 ML: 2; 2 INJECTION INTRAMUSCULAR at 05:25

## 2023-02-19 NOTE — DISCHARGE INSTRUCTIONS
1.  Make an appointment to follow-up with your PCP in the next 2 to 3 days. 2.  Wound reevaluation in next 24 to 48 hours by healthcare provider. 3.  Return immediately to the emergency department for any worsening or concerning symptoms. 4.  Take Tylenol and/or ibuprofen as needed as directed on container. 5.  Take medication as prescribed.

## 2023-02-19 NOTE — Clinical Note
Caro Atkins was seen and treated in our emergency department on 2/19/2023. She may return to work on . If you have any questions or concerns, please don't hesitate to call.       Ligia Warner MD

## 2023-02-19 NOTE — ED PROVIDER NOTES
Emergency Department Encounter  [facility] EMERGENCY DEPT    Patient: Jorge Garrido  MRN: [id number]  : 1980  Date of Evaluation: 2023  ED Provider: Elizabeth Suraez MD    Chief Complaint       Chief Complaint   Patient presents with    Animal Bite     Coral Cuello is a 40 y.o. female who presents to the emergency department with report that her 2 dogs fighting and she is bit to the left middle finger. The patient stated that her dog's shots are up-to-date. She is not sure if her tetanus is up-to-date. She denies any numbness, tingling, weakness. ROS:     At least 10 systems reviewed and otherwise acutely negative except as in the [address] Past History     Past Medical History:   Diagnosis Date    ADD (attention deficit disorder)     Allergic rhinitis     Chronic low back pain     Palpitations     Positive MACIEL (antinuclear antibody) 2017    PVC's (premature ventricular contractions)     Raynaud's phenomenon      No past surgical history on file. Social History     Socioeconomic History    Marital status:    Tobacco Use    Smoking status: Never    Smokeless tobacco: Never   Substance and Sexual Activity    Alcohol use: Yes     Alcohol/week: 1.0 standard drink    Drug use: No       Medications/Allergies     Discharge Medication List as of 2023  5:52 AM        CONTINUE these medications which have NOT CHANGED    Details   carBAMazepine (TEGRETOL) 200 MG tablet TAKE 2 TABLETS BY MOUTH TWICE DAILY (INDICATIONS:  FACIAL NERVE PAIN)Historical Med      cetirizine (ZYRTEC) 10 MG tablet Take by mouthHistorical Med      Cholecalciferol 50 MCG (2000) CAPS Take 1 capsule by mouth once dailyHistorical Med      metoprolol succinate (TOPROL XL) 25 MG extended release tablet Take 25 mg by mouth dailyHistorical Med      rizatriptan (MAXALT) 10 MG tablet TAKE AS DIRECTED BY MOUTH.  TAKE 1 TABLET BY MOUTH AT HEADACHE ONSET, MAY REPEAT IN 2 HOURS, NO MORE THAN 2 PER DAYS, 4 PER WEEKS, OR 10 PER MONTHHistorical Med      sertraline (ZOLOFT) 50 MG tablet Take 50 mg by mouth dailyHistorical Med      amphetamine-dextroamphetamine (ADDERALL XR) 25 MG extended release capsule Take 1 capsule by mouth every morning for 30 days. Max Daily Amount: 25 mg, Disp-30 capsule, R-0Normal      ferrous sulfate (IRON 325) 325 (65 Fe) MG tablet Take 325 mg by mouth every morning (before breakfast)Historical Med           Allergies   Allergen Reactions    Cephalosporins Anaphylaxis        Physical Exam       ED Triage Vitals [02/19/23 0113]   BP Temp Temp Source Heart Rate Resp SpO2 Height Weight   (!) 165/91 98.8 °F (37.1 °C) Oral 69 18 100 % -- --     GENERAL APPEARANCE: Awake and alert. Cooperative. No acute distress. No obvious discomfort. HEAD: Normocephalic. Atraumatic. EYES: Sclera anicteric. PERRL.  ENT: Tolerates saliva. No trismus. NECK: Supple. Trachea midline. CARDIO: RRR. Radial pulse 2+. No audible murmur. LUNGS: Respirations unlabored. CTAB and symmetrical with fair to good air movement. ABDOMEN: Soft. Non-distended. Non-tender. EXTREMITIES: No acute deformities. No clubbing, edema, or cyanosis. The left distal fragments have superficial abrasions at the palmar side of the inner and standing laterally and medially without involvement of the fingernail. There is swelling and erythema of the distal phalanx. SKIN: Warm and dry. As above otherwise unremarkable. NEUROLOGICAL: No gross facial drooping. Moves all 4 extremities spontaneously. No gross motor or sensory deficits. Cranial nerves III through XII are intact. Speech is. Patient is a family was anticipated. PSYCHIATRIC: Normal mood. Diagnostics   Labs:  No results found for this visit on 02/19/23. Radiographs:  No results found.     Procedures/EKG:   None    ED Course and MDM   In brief, Laly Gamez is a 40 y.o. female who presented to the emergency department the wound was cleaned and scrubbed prior to me seeing the patient. The patient had additional wound care by the nursing staff with application of Neosporin and ointment. Her tetanus is updated. The patient will be started on Augmentin which she has previously tolerated without difficulty. He is to be reevaluated by healthcare provider next 24 to 48 hours. Patient is to return immediately to the emergency department for any worsening or concerning symptoms. At the time of disposition, she is stable and in no acute distress or obvious discomfort. ED Medication Orders (From admission, onward)      Start Ordered     Status Ordering Provider    02/19/23 0515 02/19/23 0458  diptheria-tetanus toxoids (TDVAX) 2-2 LF/0.5ML injection 0.5 mL  ONCE         Last MAR action: Given - by Abraham Reading on 02/19/23 at 0525 [address]     02/19/23 0515 02/19/23 0458  amoxicillin-clavulanate (AUGMENTIN) 875-125 MG per tablet 1 tablet  NOW        Question:  Antimicrobial Indications  Answer:  Skin and Soft Tissue Infection    Last MAR action: Given - by Abraham Reading on 02/19/23 at 0525 [address]     02/19/23 0515 02/19/23 0459  neomycin-bacitracin-polymyxin (NEOSPORIN) ointment  ONCE         Last MAR action: Given - by Abraham Reading on 02/19/23 at [facility], [address] [address]            Final Impression      1.  Dog bite, initial encounter      DISPOSITION Decision To Discharge 02/19/2023 05:49:14 AM     (Please note that portions of this note may have been completed with a voice recognition program. Efforts were made to edit the dictations but occasionally words are mis-transcribed.)    Dagoberto Moss MD  [address], [place]  02/23/23 3189

## 2023-02-19 NOTE — ED NOTES
Neosporin placed to wound, covered in non stick dressing and wrapped in coband       Robert Hightower RN  02/19/23 5224

## 2023-02-19 NOTE — Clinical Note
Constance Segal was seen and treated in our emergency department on 2/19/2023. She may return to work on . If you have any questions or concerns, please don't hesitate to call.       Arnoldo Delgadillo MD

## 2023-02-19 NOTE — ED NOTES
Pt was breaking up a fight between her dogs and was bit on the third finger of LT hand.   UTD on shots       Oskar Paulino RN  02/19/23 0104

## 2023-02-22 DIAGNOSIS — Z11.4 ENCOUNTER FOR SCREENING FOR HUMAN IMMUNODEFICIENCY VIRUS (HIV): ICD-10-CM

## 2023-02-22 DIAGNOSIS — I10 PRIMARY HYPERTENSION: Primary | ICD-10-CM

## 2023-02-22 DIAGNOSIS — N92.0 MENORRHAGIA WITH REGULAR CYCLE: ICD-10-CM

## 2023-02-22 DIAGNOSIS — D50.0 IRON DEFICIENCY ANEMIA DUE TO CHRONIC BLOOD LOSS: ICD-10-CM

## 2023-02-22 DIAGNOSIS — Z00.00 LABORATORY TESTS ORDERED AS PART OF A COMPLETE PHYSICAL EXAM (CPE): ICD-10-CM

## 2023-02-22 DIAGNOSIS — E55.9 VITAMIN D DEFICIENCY: ICD-10-CM

## 2023-02-24 LAB
A/G RATIO: 1.8 RATIO (ref 1.1–2.6)
ALBUMIN SERPL-MCNC: 4.4 G/DL (ref 3.5–5)
ALP BLD-CCNC: 110 U/L (ref 25–115)
ALT SERPL-CCNC: 21 U/L (ref 5–40)
ANION GAP SERPL CALCULATED.3IONS-SCNC: 12 MMOL/L (ref 3–15)
AST SERPL-CCNC: 16 U/L (ref 10–37)
BASOPHILS # BLD: 0 % (ref 0–2)
BASOPHILS ABSOLUTE: 0 K/UL (ref 0–0.2)
BILIRUB SERPL-MCNC: 0.2 MG/DL (ref 0.2–1.2)
BUN BLDV-MCNC: 12 MG/DL (ref 6–22)
CALCIUM SERPL-MCNC: 9.1 MG/DL (ref 8.4–10.5)
CHLORIDE BLD-SCNC: 102 MMOL/L (ref 98–110)
CHOLESTEROL/HDL RATIO: 5.3 (ref 0–5)
CHOLESTEROL: 255 MG/DL (ref 110–200)
CO2: 26 MMOL/L (ref 20–32)
CREAT SERPL-MCNC: 0.9 MG/DL (ref 0.5–1.2)
CREATININE, EXTERNAL: 0.9
EOSINOPHIL # BLD: 1 % (ref 0–6)
EOSINOPHILS ABSOLUTE: 0 K/UL (ref 0–0.5)
FERRITIN: 11 NG/ML (ref 10–291)
GLOBULIN: 2.4 G/DL (ref 2–4)
GLOMERULAR FILTRATION RATE: >60 ML/MIN/1.73 SQ.M.
GLUCOSE: 94 MG/DL (ref 70–99)
HCT VFR BLD CALC: 40.8 % (ref 35.1–46.5)
HDLC SERPL-MCNC: 48 MG/DL
HEMOGLOBIN: 12.8 G/DL (ref 11.7–15.5)
HIV -1/0/2 AG/AB WITH REFLEX: NON REACTIVE
HIV INTERPRETATION: NORMAL
IRON % SATURATION: 9 % (ref 20–50)
IRON: 33 MCG/DL (ref 30–160)
LDL CHOLESTEROL CALCULATED: 176 MG/DL (ref 50–99)
LDL CHOLESTEROL, EXTERNAL: 176
LDL/HDL RATIO: 3.7
LYMPHOCYTES # BLD: 25 % (ref 20–45)
LYMPHOCYTES ABSOLUTE: 1.3 K/UL (ref 1–4.8)
MCH RBC QN AUTO: 27 PG (ref 26–34)
MCHC RBC AUTO-ENTMCNC: 31 G/DL (ref 31–36)
MCV RBC AUTO: 85 FL (ref 80–99)
MONOCYTES ABSOLUTE: 0.3 K/UL (ref 0.1–1)
MONOCYTES: 6 % (ref 3–12)
NEUTROPHILS ABSOLUTE: 3.4 K/UL (ref 1.8–7.7)
NEUTROPHILS: 67 % (ref 40–75)
NON-HDL CHOLESTEROL: 207 MG/DL
PDW BLD-RTO: 14.8 % (ref 10–15.5)
PLATELET # BLD: 255 K/UL (ref 140–440)
PMV BLD AUTO: 10.7 FL (ref 9–13)
POTASSIUM SERPL-SCNC: 3.6 MMOL/L (ref 3.5–5.5)
RBC: 4.83 M/UL (ref 3.8–5.2)
SODIUM BLD-SCNC: 140 MMOL/L (ref 133–145)
TOTAL IRON BINDING CAPACITY: 355 MCG/DL (ref 228–428)
TOTAL PROTEIN: 6.8 G/DL (ref 6.4–8.3)
TRIGL SERPL-MCNC: 156 MG/DL (ref 40–149)
UIBC: 322 MCG/DL (ref 110–370)
VITAMIN D 25-HYDROXY: 41.3 NG/ML (ref 32–100)
VLDLC SERPL CALC-MCNC: 31 MG/DL (ref 8–30)
WBC: 5 K/UL (ref 4–11)

## 2023-02-26 NOTE — PROGRESS NOTES
HPI:   Lenora Portillo is a 40y.o. year old female who presents today for a physical exam. She has a history of hyperlipidemia, allergic rhinitis, attention deficit disorder, Raynaud's phenomenon, migraine headaches, and chronic neck and back pain. She completed two doses of the Pfizer COVID-19 vaccine series but has not received any booster doses. On 2/19/2023, she presented to [facility] ED after sustaining a dog bite to her left middle finger. She reported that she was attempting to separate her two dogs who were fighting. The wound was cleaned and scrubbed, and Neosporin was applied. She was prescribed Augmentin for 10 days. She reports today that she is doing better. She states that her finger wound is slowly healing and she is continuing to apply Neosporin and a bandage daily. She states that she experienced nausea and vomiting after a single dose of Augmentin so did not continue taking it. She denies any fever, chills, redness or drainage from the wound. She also reports that she has been experiencing some increased anxiety and panic attacks, particularly triggered by visits to the ED. She has been trying mindful methods to abort the attacks including specific music and interactions with her dog. She also continues on sertraline 50 mg daily. She is otherwise without new complaints. On 7/8/2022, she had a virtual visit with Dr. Joana Grimes for evaluation of severe left-sided facial pain and was diagnosed with trigeminal neuralgia. She was started on carbamazepine 200 mg twice daily and dose titrated to 400 mg twice daily with some improvement until 8/3/2022 when her pain acutely increased. She presented to the [facility] ED on 8/3/2022 and evaluation included negative pregnancy test and EKG sinus rhythm at 77 bpm.  She was discharged and advised to continue Tegretol.   On 8/6/2022, she presented to [facility] ED due to persistent breakthrough pain and no testing was performed and no changes in treatment recommended. She again presented to [facility] 8/9/2022 and BMP/magnesium normal and head CT scan negative. She improved after treatment with 1 L IV fluid, Compazine, Toradol, Benadryl, and 5 mg Valium, and was discharged with prescriptions for Fioricet and Compazine. She contacted the office and was advised to titrate carbamazepine to 400 mg twice daily, and underwent brain MRI/MRA (8/22/2022) which were normal.  She underwent evaluation by Dr. Carola Jo on  8/11/2022 and it was felt that her headaches were related to both migraines in addition to left trigeminal neuralgia. She was started on gabapentin 600 mg nightly and Compazine in addition to Tegretol 400 mg twice daily. She found gabapentin to be greatly effective and was advised to use Fioricet sparingly as could cause rebound headaches. She was given instructions to taper Tegretol as tolerated at a follow-up visit on 9/22/2022. She presents today and reports good control of her headaches on gabapentin 600 mg nightly and Tegretol 400 mg every morning and 200 mg nightly. She has not had recurrence of her trigeminal neuralgia pain since initiating treatment with gabapentin. On 9/18/2021, she reports that she developed palpitations with lightheadedness/ presyncope and recorded on her Apple Watch a narrow complex tachycardia at rate of 230 bpm.  She reports that she spontaneously reverted to sinus rhythm. She presented to [facility] ED for evaluation and evaluation showed evidence of a microcytic anemia (Hb 11.4/HCT 37.8 MCV 75.4), K 3.7, creatinine 0.87/eGFR > 60, magnesium 2.1; EKG showed sinus rhythm at 99 bpm.  She was diagnosed with paroxysmal SVT and started on metoprolol succinate 12.5 mg daily and continue treatment for 30 days but did not refill. She was referred to follow-up with cardiology but did not schedule.  She reports that she has a long history of intermittent palpitations for many years and states that she did have a Holter monitor placed in the past which was negative. She denies any recurrence of palpitations since the episode. Summary of prior hospitalizations and medical history:  She states that she has a long history dating back for at least 10 years where she has been experiencing chronic pain, particularly in her lower back with bilateral numbness and tingling in both legs, and in her neck. She was evaluated in [id number] by Dr. Georgette Medina, and underwent EMG/ NCS reportedly showing right C7-8 radiculopathy, right L5 radiculopathy and bilateral sensory polyneuropathy. She had a lumbar MRI at [facility] showing mild degenerative disc changes. She also had a brain MRI which was negative. She was reportedly found to have a low B12 level and was treated with a supplement. She has used naproxen intermittently over the years for discomfort. In 5/2017, she presented to Dr. Jodi Young for evaluation and underwent repeat EMG/ NCS, which were reportedly \"normal\". She also had MACIEL which was strongly positive 1:1280 in speckled pattern. RF, ESR, TSH, and folate were normal. B12 was low normal, but MMA was in normal range. She was last seen on 10/14/2017 and pain in her ribs and neck, but most significant was pain in her lower back with bilateral sciatica. She denied pain in her hands or other peripheral joints, and denied rashes, oral ulcers, shortness of breath, or hematuria. She also reported episodes where her hands and feet become cold, white, and painful, and she stated that she carried hand and foot warmers with her year round. Lab evaluation included ESR, C-reactive protein, rheumatoid factor and CCP, and HLA-B27 which were all negative. She was referred to rheumatology but did not show for her appointment. She has a history of palpitations and presented to [facility] ED in 12/2016 for evaluation. EKG showed frequent PVCs and she was advised to decrease her caffeine consumption. She does not exercise regularly. She denies any chest pain, shortness of breath at rest or with exertion, lightheadedness, or edema. She has a history of attention deficit disorder, diagnosed at the age of 16. She was initially treated with Ritalin but discontinued taking it. She has been treated with Adderall for the last several years. She has a history of allergic rhinitis and seasonal allergies. She is being treated with Singulair and Nasacort. She has a history of anxiety and in 5/2021 reported increasing anxiety and increase in frequency and severity of panic attacks, which include symptoms of hand tremor, rocking and difficulty breathing. She reported difficulty with panic episodes over the years, but felt that they were significantly worse. She was started on Zoloft with improvement. Past Medical History:   Diagnosis Date    ADD (attention deficit disorder)     Allergic rhinitis     Chronic low back pain     Palpitations     Positive MACIEL (antinuclear antibody) 05/2017    PVC's (premature ventricular contractions)     Raynaud's phenomenon      History reviewed. No pertinent surgical history. Current Outpatient Medications   Medication Sig    sertraline (ZOLOFT) 100 MG tablet Take 1 tablet by mouth daily    propranolol (INDERAL LA) 80 MG extended release capsule Take 1 capsule by mouth daily    carBAMazepine (TEGRETOL) 200 MG tablet 400 mg qAM and 200 mg qPM    cetirizine (ZYRTEC) 10 MG tablet Take by mouth    Cholecalciferol 50 MCG (2000 UT) CAPS Take 1 capsule by mouth once daily    rizatriptan (MAXALT) 10 MG tablet TAKE AS DIRECTED BY MOUTH. TAKE 1 TABLET BY MOUTH AT HEADACHE ONSET, MAY REPEAT IN 2 HOURS, NO MORE THAN 2 PER DAYS, 4 PER WEEKS, OR 10 PER MONTH    amphetamine-dextroamphetamine (ADDERALL XR) 25 MG extended release capsule Take 1 capsule by mouth every morning for 30 days.  Max Daily Amount: 25 mg    fluticasone (FLONASE) 50 MCG/ACT nasal spray 2 sprays by Nasal route daily    montelukast (SINGULAIR) 10 MG tablet Take 10 mg by mouth daily    gabapentin (NEURONTIN) 600 MG tablet TAKE 1 TABLET BY MOUTH EVERY DAY AT BEDTIME     No current facility-administered medications for this visit. Allergies and Intolerances: Allergies   Allergen Reactions    Cephalosporins Anaphylaxis, Rash and Shortness Of Breath     Family History:  Her mother has sarcoid and was diagnosed with DCIS breast cancer at the age of 62. She is s/p bilateral mastectomy. Her paternal grandmother had breast and ovarian cancer. She has no family history of colon cancer. Family History   Problem Relation Age of Onset    Hypertension Mother      Social History:   She  reports that she has never smoked. She has never used smokeless tobacco. She is  with two children. She was working as the  for a [employer], but she is no longer working. Social History     Substance and Sexual Activity   Alcohol Use Yes    Alcohol/week: 1.0 standard drink     Immunization History:  Immunization History   Administered Date(s) Administered    COVID-19, PFIZER PURPLE top, DILUTE for use, (age 15 y+), 30mcg/0.3mL 09/22/2022    Td (Adult), 2 Lf Tetanus Toxoid, Pf (Td, Absorbed) 02/19/2023    Td vaccine (adult) 10/19/2005    Tdap (Boostrix, Adacel) 10/04/2017       Review of Systems:   As above included in HPI. Otherwise 11 point review of systems negative including constitutional, skin, HENT, eyes, respiratory, cardiovascular, gastrointestinal, genitourinary, musculoskeletal, endocrine, hematologic, allergy, and neurologic. Physical:   Vitals:    02/28/23 1343 02/28/23 1414   BP: (!) 142/82 138/80   Pulse: 84    Resp: 16    Temp: 97.8 °F (36.6 °C)    TempSrc: Temporal    SpO2: 100%    Weight: 169 lb (76.7 kg)    Height: 5' 9\" (1.753 m)        Exam:   Patient appears in no apparent distress. Affect is appropriate.     HEENT --Anicteric sclerae, tympanic membranes normal,  ear canals normal.  PERRL, EOMI, conjunctiva and lids normal.  No cervical lymphadenopathy. No thyromegaly, JVD, or bruits. Carotid pulses 2+ with normal upstroke. Lungs --Clear to auscultation. No wheezing or rales. Heart --Regular rate and rhythm, no murmurs, rubs, gallops, or clicks. Abdomen -- Soft and nontender, no hepatosplenomegaly or masses. Extremities -- Without cyanosis, clubbing, edema. Normal looking digits, ROM intact  Neuro -- CN 2-12 intact, strength 5/5 with intact soft touch in all extremities; 2 lacerations without evidence of discharge or erythema on distal phalanx of left hand middle finger.   Derm - no obvious abnormalities noted, no rash      Review of Data:  Labs:  Orders Only on 02/22/2023   Component Date Value Ref Range Status    Triglycerides 02/23/2023 156 (A)  40 - 149 mg/dL Final    HDL 02/23/2023 48  >=40 mg/dL Final    Cholesterol 02/23/2023 255 (A)  110 - 200 mg/dL Final    Chol/HDL Ratio 02/23/2023 5.3  0.0 - 5.0 Final    Non-HDL Cholesterol 02/23/2023 207 (A)  <130 mg/dL Final    LDL Calculated 02/23/2023 176 (A)  50 - 99 mg/dL Final    VLDL Cholesterol Calculated 02/23/2023 31 (A)  8 - 30 mg/dL Final    LDL/HDL Ratio 02/23/2023 3.7   Final    Glucose 02/23/2023 94  70 - 99 mg/dL Final    BUN 02/23/2023 12  6 - 22 mg/dL Final    Creatinine 02/23/2023 0.9  0.5 - 1.2 mg/dL Final    Sodium 02/23/2023 140  133 - 145 mmol/L Final    Potassium 02/23/2023 3.6  3.5 - 5.5 mmol/L Final    Chloride 02/23/2023 102  98 - 110 mmol/L Final    CO2 02/23/2023 26  20 - 32 mmol/L Final    AST 02/23/2023 16  10 - 37 U/L Final    ALT 02/23/2023 21  5 - 40 U/L Final    Alkaline Phosphatase 02/23/2023 110  25 - 115 U/L Final    Total Bilirubin 02/23/2023 0.2  0.2 - 1.2 mg/dL Final    Calcium 02/23/2023 9.1  8.4 - 10.5 mg/dL Final    Total Protein 02/23/2023 6.8  6.4 - 8.3 g/dL Final    Albumin 02/23/2023 4.4  3.5 - 5.0 g/dL Final    Albumin/Globulin Ratio 02/23/2023 1.8  1.1 - 2.6 ratio Final    Globulin 02/23/2023 2.4  2.0 - 4.0 g/dL Final    Glomerular Filtration Rate 02/23/2023 >60.0  >60.0 mL/min/1.73 sq.m. Final    Anion Gap 02/23/2023 12.0  3.0 - 15.0 mmol/L Final    Ferritin 02/23/2023 11  10 - 291 ng/mL Final    Iron 02/23/2023 33  30 - 160 mcg/dL Final    UIBC 02/23/2023 322  110 - 370 mcg/dL Final    TIBC 02/23/2023 355  228 - 428 mcg/dL Final    Iron % Saturation 02/23/2023 9 (A)  20 - 50 % Final    Vit D, 25-Hydroxy 02/23/2023 41.3  32.0 - 100.0 ng/mL Final    WBC 02/23/2023 5.0  4.0 - 11.0 K/uL Final    RBC 02/23/2023 4.83  3.80 - 5.20 M/uL Final    Hemoglobin 02/23/2023 12.8  11.7 - 15.5 g/dL Final    Hematocrit 02/23/2023 40.8  35.1 - 46.5 % Final    MCV 02/23/2023 85  80 - 99 fL Final    MCH 02/23/2023 27  26 - 34 pg Final    MCHC 02/23/2023 31  31 - 36 g/dL Final    RDW 02/23/2023 14.8  10.0 - 15.5 % Final    Platelets 38/90/9760 255  140 - 440 K/uL Final    MPV 02/23/2023 10.7  9.0 - 13.0 fL Final    Neutrophils 02/23/2023 67  40 - 75 % Final    Lymphocytes 02/23/2023 25  20 - 45 % Final    Monocytes 02/23/2023 6  3 - 12 % Final    Eosinophils 02/23/2023 1  0 - 6 % Final    Basophils 02/23/2023 0  0 - 2 % Final    Neutrophils Absolute 02/23/2023 3.4  1.8 - 7.7 K/uL Final    Lymphocytes Absolute 02/23/2023 1.3  1.0 - 4.8 K/uL Final    Monocytes Absolute 02/23/2023 0.3  0.1 - 1.0 K/uL Final    Eosinophils Absolute 02/23/2023 0.0  0.0 - 0.5 K/uL Final    Basophils Absolute 02/23/2023 0.0  0.0 - 0.2 K/uL Final    HIV -1/0/2 Ag/Ab with Reflex 02/23/2023 Non Reactive  Non Reactive Final    HIV INTERPRETATION 02/23/2023 HIV-1 antigen and HIV 1/2 antibodies not detected. No laboratory evidence of   Final         Calculated 10 year ASCVD risk score: 1.5 %     Health Maintenance:  Screening:               Mammogram: Negative (12/2022) mother with DCIS              PAP smear: well woman exam and pap smear (2/2019) negative. Due 2/2022.                     Colorectal: N/A              Depression: anxiety on sertraline              DM (HbA1c/FPG): FPG 94 (2/2023) Hepatitis C: negative (5/2022)/HIV negative (2/2023)              Falls: none              DEXA: N/A              Glaucoma: unknown              Smoking: none              Vitamin D: 41.3 (2/2023)              Medicare Wellness: N/A        Impression:  Patient Active Problem List   Diagnosis    Vitamin D deficiency    Anxiety    Allergic rhinitis    ADD (attention deficit disorder)    Raynaud's phenomenon    Positive MACIEL (antinuclear antibody)    Chronic low back pain    Panic disorder    Primary hypertension    Paroxysmal SVT (supraventricular tachycardia) (HCC)    Menorrhagia with regular cycle    Iron deficiency anemia due to chronic blood loss    Migraine without aura or status migrainosus    Overweight (BMI 25.0-29. 9)    Hyperlipidemia       Plan:  1. Migraine headaches. Left-sided headache initially felt to be secondary to trigeminal neuralgia but given ongoing difficulty thought to also be exacerbation of migraine headaches. Known history of migraines and prior baseline with 1-2 times per month responding readily to Excedrin Migraine. No longer taking metoprolol but given elevated blood pressure, will initiate propranolol which will likely have beneficial effect on migraine prevention. Also prescribed rizatriptan by neurology to use as needed. Will continue to monitor. 2. Trigeminal neuralgia. Onset of severe left-sided facial pain in 7/2022 and diagnosed with trigeminal neuralgia. Multiple ED visits with ongoing pain while titrating dose of Tegretol for control. Underwent brain MRI/MRA (8/22/2022) without underlying structural cause. Referred to Dr. Vikki Cheng (8/11/2022) and felt that migraines were also contributing to headaches and gabapentin added to Tegretol with excellent control. Continues on gabapentin 600 mg nightly and Tegretol 400 mg every morning and 200 mg every afternoon with good control of symptoms today. Will continue to monitor. 3.  Paroxysmal SVT.   Reports long history of intermittent palpitations for many years although reports Holter monitor in the past was negative. On 9/18/2021, developed lightheadedness/ presyncope, and a narrow complex tachycardia at rate of 230 bpm was recorded on Apple Watch. Spontaneously reverted to sinus rhythm and presented to [facility] ED and evaluation showed microcytic anemia (Hb 11.4/HCT 37.8 MCV 75.4) and normal electrolytes; EKG sinus rhythm at 99 bpm.  Sarted on metoprolol succinate 12.5 mg daily and referred to follow-up with cardiology. Did not continue on metoprolol or schedule appointment with cardiology and has not had recurrence. Aware that treatment with Adderall for ADHD may be exacerbating symptoms. Will continue to monitor. 4. Hypertension. Blood pressure elevated today in office and similar elevated readings on prior measurements. Prescribed metoprolol succinate 25 mg daily in 5/2022 but not currently taking. Discussed need for better blood pressure control and given migraine history, will treat with propranolol XL 80 mg daily. Advised patient to monitor blood pressure at home for response and call if not improved. Renal function remains normal with creatinine 0.9/eGFR > 60. Will reassess at next visit. 5. Hyperlipidemia. Calculated 10 year ASCVD risk is 1.5 %, which does not place her in one of the four statin benefit groups as per new AHA/ACC guidelines. However, notable worsening of LDL today to 176 and HDL 48 despite maintaining weight since last visit. Recommended to monitor diet closely and stressed importance of heart healthy choices. Also advised to increase exercise. Will reassess at next visit. 6. Iron deficiency anemia. Patient reports monthly menstrual cycle with heavy bleeding with clots for 4 days. Evidence of anemia resolved today but with persistent iron deficiency with ferritin 11 and transferrin 9 %. Advised to begin ferrous sulfate 325 mg daily.   Also advised to schedule gynecologic exam is due for cervical cancer screening. 7. Anxiety with panic disorder. Reported increase in frequency and severity of panic attacks including symptoms of hand tremor, rocking and difficulty breathing. Also reported an overall increase in anxiety symptoms. Started on Zoloft 25 mg daily in 5/2021 and dose titrated to 50 mg daily in 5/2022. Reports ongoing difficulty with panic attacks although has attempted conservative measures including listening to music and comfort from her dogs in order to help control. Agreeable to increase dose of Zoloft to 100 mg daily. Will reassess at next visit for response. 8. Attention deficit disorder. Continue Adderall. Discussed that elevated blood pressure, anxiety, and episodes of SVT may be exacerbated by use of Adderall but patient feels benefits outweigh risks. Will continue to address. 9. Allergic rhinitis. Symptoms well controlled on Singulair, Zyrtec, and Nasacort. Follow. 10. Vitamin D deficiency. Very low level in 2/2018 at 4.8. Treatment with ergocalciferol and improved to low normal (30.8) at last visit in 5/2021. Continuing on maintenance dose supplement of 2000 U daily with continued normalization of level today. Will continue to monitor. 11. Chronic low back pain. Intermittent long standing symptoms involving her lower back with sciatica. MRI lumbar from 2009 without significant disease to explain symptoms. Described pain particularly in sacroiliac joints, and also in her ribs and neck. Labs (10/2017) including HLA-B27, ESR, CRP, RF, and CCP negative. Referred to Dr. Laurie Yusuf but due to prior no-shows, not offered an appointment. Symptoms appear to have improved and without significant complaint today. 12. History of positive MACIEL. Strongly positive at 1:1280 speckled pattern and scleroderma Ab positive at 4.8 (9/2017). Unclear significance but currently without symptoms. Will continue to monitor. 13. Dog bite, subsequent encounter.   Sustained dog bite to her left middle finger on 2/19/2023 when attempting to separate her 2 dogs at home. Presented to [facility] ED and wound irrigated, scrubbed, and Neosporin applied. Prescribed Augmentin, but patient states that she only took 1 dose due to nausea and vomiting. Reviewed images from ED visit and wound appears improved today without evidence of infection and given that already 10 days since ED visit without evidence of infection, will not initiate antibiotics at this time. Advised to continue with close observation and local wound care until healed. Discussed presenting to ED if develop any signs of infection. 14. Overweight. Weight overall stable today with normalization of BMI at 24.96. Emphasized importance of lifestyle modifications, including heart healthy diet and regular exercise. 15. Health maintenance. Received two doses of the Pfizer COVID-19 vaccine and advised to proceed with the updated bivalent booster dose. Other immunizations up to date. Vitamin D level remains normal on maintenance dose supplement. Well woman exam and pap smear due, and recommended to be performed by gynecology given menorrhagia and iron deficiency. Patient states she will schedule. Completed baseline mammogram given family history of breast cancer in her mother. Emphasized importance of lifestyle modifications, including heart healthy diet, regular exercise, and weight loss. Patient understands recommendations and agrees with plan. Follow-up in 6 months. Time spent in preparing for the visit, including review of history, tests done prior to arrival, additional time reviewing clinical data, imaging, outside records and test results:  5  minutes. Time spent in counseling with patient and/or family members regarding care plan: 30 minutes. Time spent in ordering tests, treatments, and referring patient for further care: 10 minutes. Time spent on visit does not include time for documentation.

## 2023-02-28 ENCOUNTER — OFFICE VISIT (OUTPATIENT)
Age: 43
End: 2023-02-28

## 2023-02-28 VITALS
OXYGEN SATURATION: 100 % | TEMPERATURE: 97.8 F | WEIGHT: 169 LBS | SYSTOLIC BLOOD PRESSURE: 138 MMHG | BODY MASS INDEX: 25.03 KG/M2 | HEIGHT: 69 IN | HEART RATE: 84 BPM | RESPIRATION RATE: 16 BRPM | DIASTOLIC BLOOD PRESSURE: 80 MMHG

## 2023-02-28 DIAGNOSIS — I47.1 PAROXYSMAL SVT (SUPRAVENTRICULAR TACHYCARDIA) (HCC): ICD-10-CM

## 2023-02-28 DIAGNOSIS — N92.0 MENORRHAGIA WITH REGULAR CYCLE: ICD-10-CM

## 2023-02-28 DIAGNOSIS — Z00.00 LABORATORY TESTS ORDERED AS PART OF A COMPLETE PHYSICAL EXAM (CPE): ICD-10-CM

## 2023-02-28 DIAGNOSIS — F41.9 ANXIETY: ICD-10-CM
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
## 2023-02-28 DIAGNOSIS — E55.9 VITAMIN D DEFICIENCY, UNSPECIFIED: ICD-10-CM

## 2023-02-28 DIAGNOSIS — G43.009 MIGRAINE WITHOUT AURA AND WITHOUT STATUS MIGRAINOSUS, NOT INTRACTABLE: ICD-10-CM

## 2023-02-28 DIAGNOSIS — F98.8 ATTENTION DEFICIT DISORDER, UNSPECIFIED HYPERACTIVITY PRESENCE: ICD-10-CM

## 2023-02-28 DIAGNOSIS — G50.0 TRIGEMINAL NEURALGIA: Primary | ICD-10-CM

## 2023-02-28 DIAGNOSIS — I10 PRIMARY HYPERTENSION: ICD-10-CM

## 2023-02-28 DIAGNOSIS — D50.0 IRON DEFICIENCY ANEMIA DUE TO CHRONIC BLOOD LOSS: ICD-10-CM

## 2023-02-28 DIAGNOSIS — F41.0 PANIC DISORDER: ICD-10-CM

## 2023-02-28 DIAGNOSIS — E78.2 MIXED HYPERLIPIDEMIA: ICD-10-CM

## 2023-02-28 RX ORDER — SERTRALINE HYDROCHLORIDE 100 MG/1
100 TABLET, FILM COATED ORAL DAILY
Qty: 90 TABLET | Refills: 1 | Status: SHIPPED | OUTPATIENT
Start: 2023-02-28

## 2023-02-28 RX ORDER — GABAPENTIN 600 MG/1
TABLET ORAL
Qty: 90 TABLET | Status: CANCELLED | OUTPATIENT
Start: 2023-02-28

## 2023-02-28 RX ORDER — PROPRANOLOL HYDROCHLORIDE 80 MG/1
80 CAPSULE, EXTENDED RELEASE ORAL DAILY
Qty: 90 CAPSULE | Refills: 1 | Status: SHIPPED | OUTPATIENT
Start: 2023-02-28

## 2023-02-28 RX ORDER — GABAPENTIN 600 MG/1
TABLET ORAL
COMMUNITY
Start: 2023-01-20

## 2023-02-28 NOTE — PATIENT INSTRUCTIONS
Heart-Healthy Diet: Care Instructions  Your Care Instructions     A heart-healthy diet has lots of vegetables, fruits, nuts, beans, and whole grains, and is low in salt. It limits foods that are high in saturated fat, such as meats, cheeses, and fried foods. It may be hard to change your diet, but even small changes can lower your risk of heart attack and heart disease. Follow-up care is a key part of your treatment and safety. Be sure to make and go to all appointments, and call your doctor if you are having problems. It's also a good idea to know your test results and keep a list of the medicines you take. How can you care for yourself at home? Watch your portions  Learn what a serving is. A \"serving\" and a \"portion\" are not always the same thing. Make sure that you are not eating larger portions than are recommended. For example, a serving of pasta is ½ cup. A serving size of meat is 2 to 3 ounces. A 3-ounce serving is about the size of a deck of cards. Measure serving sizes until you are good at [employer]" them. Keep in mind that restaurants often serve portions that are 2 or 3 times the size of one serving. To keep your energy level up and keep you from feeling hungry, eat often but in smaller portions. Eat only the number of calories you need to stay at a healthy weight. If you need to lose weight, eat fewer calories than your body burns (through exercise and other physical activity). Eat more fruits and vegetables  Eat a variety of fruit and vegetables every day. Dark green, deep orange, red, or yellow fruits and vegetables are especially good for you. Examples include spinach, carrots, peaches, and berries. Keep carrots, celery, and other veggies handy for snacks. Buy fruit that is in season and store it where you can see it so that you will be tempted to eat it. Cook dishes that have a lot of veggies in them, such as stir-fries and soups.   Limit saturated and trans fat  Read food labels, and try to avoid saturated and trans fats. They increase your risk of heart disease. Use olive or canola oil when you cook. Bake, broil, grill, or steam foods instead of frying them. Choose lean meats instead of high-fat meats such as hot dogs and sausages. Cut off all visible fat when you prepare meat. Eat fish, skinless poultry, and meat alternatives such as soy products instead of high-fat meats. Soy products, such as tofu, may be especially good for your heart. Choose low-fat or fat-free milk and dairy products. Eat foods high in fiber  Eat a variety of grain products every day. Include whole-grain foods that have lots of fiber and nutrients. Examples of whole-grain foods include oats, whole wheat bread, and brown rice. Buy whole-grain breads and cereals, instead of white bread or pastries. Limit salt and sodium  Limit how much salt and sodium you eat to help lower your blood pressure. Taste food before you salt it. Add only a little salt when you think you need it. With time, your taste buds will adjust to less salt. Eat fewer snack items, fast foods, and other high-salt, processed foods. Check food labels for the amount of sodium in packaged foods. Choose low-sodium versions of canned goods (such as soups, vegetables, and beans). Limit sugar  Limit drinks and foods with added sugar. These include candy, desserts, and soda pop. Limit alcohol  Limit alcohol to no more than 2 drinks a day for men and 1 drink a day for women. Too much alcohol can cause health problems. When should you call for help? Watch closely for changes in your health, and be sure to contact your doctor if:    You would like help planning heart-healthy meals. Where can you learn more? Go to http://www.dunlap.com/  Enter V137 in the search box to learn more about \"Heart-Healthy Diet: Care Instructions. \"  Current as of: August 22, 2019               Content Version: 12.6  © 2843-4173 Healthwise, Incorporated. Care instructions adapted under license by [employer] (which disclaims liability or warranty for this information). If you have questions about a medical condition or this instruction, always ask your healthcare professional. [address] any warranty or liability for your use of this information. High Cholesterol: Care Instructions  Your Care Instructions     Cholesterol is a type of fat in your blood. It is needed for many body functions, such as making new cells. Cholesterol is made by your body. It also comes from food you eat. High cholesterol means that you have too much of the fat in your blood. This raises your risk of a heart attack and stroke. LDL and HDL are part of your total cholesterol. LDL is the \"bad\" cholesterol. High LDL can raise your risk for heart disease, heart attack, and stroke. HDL is the \"good\" cholesterol. It helps clear bad cholesterol from the body. High HDL is linked with a lower risk of heart disease, heart attack, and stroke. Your cholesterol levels help your doctor find out your risk for having a heart attack or stroke. You and your doctor can talk about whether you need to lower your risk and what treatment is best for you. A heart-healthy lifestyle along with medicines can help lower your cholesterol and your risk. The way you choose to lower your risk will depend on how high your risk is for heart attack and stroke. It will also depend on how you feel about taking medicines. Follow-up care is a key part of your treatment and safety. Be sure to make and go to all appointments, and call your doctor if you are having problems. It's also a good idea to know your test results and keep a list of the medicines you take. How can you care for yourself at home? Eat a variety of foods every day.  Good choices include fruits, vegetables, whole grains (like oatmeal), dried beans and peas, nuts and seeds, soy products (like tofu), and fat-free or low-fat dairy products.  Replace butter, margarine, and hydrogenated or partially hydrogenated oils with olive and canola oils. (Canola oil margarine without trans fat is fine.)  Replace red meat with fish, poultry, and soy protein (like tofu).  Limit processed and packaged foods like chips, crackers, and cookies.  Bake, broil, or steam foods. Don't welsh them.  Be physically active. Get at least 30 minutes of exercise on most days of the week. Walking is a good choice. You also may want to do other activities, such as running, swimming, cycling, or playing tennis or team sports.  Stay at a healthy weight or lose weight by making the changes in eating and physical activity listed above. Losing just a small amount of weight, even 5 to 10 pounds, can reduce your risk for having a heart attack or stroke.  Do not smoke.  When should you call for help?  Watch closely for changes in your health, and be sure to contact your doctor if:    You need help making lifestyle changes.     You have questions about your medicine.   Where can you learn more?  Go to https://www.[employer].FAST FELT/Runscopeonnections  Enter I865 in the search box to learn more about \"High Cholesterol: Care Instructions.\"  Current as of: December 16, 2019               Content Version: 12.6  © 4892-3619 [employer].   Care instructions adapted under license by [employer] (which disclaims liability or warranty for this information). If you have questions about a medical condition or this instruction, always ask your healthcare professional. [employer] disclaims any warranty or liability for your use of this information.      Learning About Low-Sodium Foods  What foods are low in sodium?    The foods you eat contain nutrients, such as vitamins and minerals. Sodium is a nutrient. Your body needs the right amount to stay healthy and work as it should. You can use the list below to help you make choices about which  foods to eat. Fruits  Fresh, frozen, canned, or dried fruit  Vegetables  Fresh or frozen vegetables, with no added salt  Canned vegetables, low-sodium or with no added salt  Grains  Bagels without salted tops  Cereal with no added salt  Corn tortillas  Crackers with no added salt  Oatmeal, cooked without salt  Popcorn with no salt  Pasta and noodles, cooked without salt  Rice, cooked without salt  Unsalted pretzels  Dairy and dairy alternatives  Butter, unsalted  Cream cheese  Ice cream  Milk  Soy milk  Meats and other protein foods  Beans and peas, canned with no salt  Eggs  Fresh fish (not smoked)  Fresh meats (not smoked or cured)  Nuts and nut butter, prepared without salt  Poultry, not packaged with sodium solution  Tofu, unseasoned  Tuna, canned without salt  Seasonings  Garlic  Herbs and spices  Lemon juice  Mustard  Olive oil  Salt-free seasoning mixes  Vinegar  Work with your doctor to find out how much of this nutrient you need. Depending on your health, you may need more or less of it in your diet. Where can you learn more? Go to http://www.gray.com/  Enter S460 in the search box to learn more about \"Learning About Low-Sodium Foods. \"  Current as of: August 22, 2019               Content Version: 12.6  © 2931-8957 [employer], [employer]. Care instructions adapted under license by [employer] (which disclaims liability or warranty for this information). If you have questions about a medical condition or this instruction, always ask your healthcare professional. [place] [address] any warranty or liability for your use of this information. Low Sodium Diet (2,000 Milligram): Care Instructions  Your Care Instructions     Too much sodium causes your body to hold on to extra water. This can raise your blood pressure and force your heart and kidneys to work harder.  In very serious cases, this could cause you to be put in the hospital. It might even be life-threatening. By limiting sodium, you will feel better and lower your risk of serious problems. The most common source of sodium is salt. People get most of the salt in their diet from canned, prepared, and packaged foods. Fast food and restaurant meals also are very high in sodium. Your doctor will probably limit your sodium to less than 2,000 milligrams (mg) a day. This limit counts all the sodium in prepared and packaged foods and any salt you add to your food. Follow-up care is a key part of your treatment and safety. Be sure to make and go to all appointments, and call your doctor if you are having problems. It's also a good idea to know your test results and keep a list of the medicines you take. How can you care for yourself at home? Read food labels  Read labels on cans and food packages. The labels tell you how much sodium is in each serving. Make sure that you look at the serving size. If you eat more than the serving size, you have eaten more sodium. Food labels also tell you the Percent Daily Value for sodium. Choose products with low Percent Daily Values for sodium. Be aware that sodium can come in forms other than salt, including monosodium glutamate (MSG), sodium citrate, and sodium bicarbonate (baking soda). MSG is often added to Asian food. When you eat out, you can sometimes ask for food without MSG or added salt. Buy low-sodium foods  Buy foods that are labeled \"unsalted\" (no salt added), \"sodium-free\" (less than 5 mg of sodium per serving), or \"low-sodium\" (less than 140 mg of sodium per serving). Foods labeled \"reduced-sodium\" and \"light sodium\" may still have too much sodium. Be sure to read the label to see how much sodium you are getting. Buy fresh vegetables, or frozen vegetables without added sauces. Buy low-sodium versions of canned vegetables, soups, and other canned goods. Prepare low-sodium meals  Cut back on the amount of salt you use in cooking.  This will help you adjust to the taste. Do not add salt after cooking. One teaspoon of salt has about 2,300 mg of sodium. Take the salt shaker off the table. Flavor your food with garlic, lemon juice, onion, vinegar, herbs, and spices. Do not use soy sauce, lite soy sauce, steak sauce, onion salt, garlic salt, celery salt, mustard, or ketchup on your food. Use low-sodium salad dressings, sauces, and ketchup. Or make your own salad dressings and sauces without adding salt. Use less salt (or none) when recipes call for it. You can often use half the salt a recipe calls for without losing flavor. Other foods such as rice, pasta, and grains do not need added salt. Rinse canned vegetables, and cook them in fresh water. This removes some--but not all--of the salt. Avoid water that is naturally high in sodium or that has been treated with water softeners, which add sodium. Call your local water company to find out the sodium content of your water supply. If you buy bottled water, read the label and choose a sodium-free brand. Avoid high-sodium foods  Avoid eating:  Smoked, cured, salted, and canned meat, fish, and poultry. Ham, duque, hot dogs, and luncheon meats. Regular, hard, and processed cheese and regular peanut butter. Crackers with salted tops, and other salted snack foods such as pretzels, chips, and salted popcorn. Frozen prepared meals, unless labeled low-sodium. Canned and dried soups, broths, and bouillon, unless labeled sodium-free or low-sodium. Canned vegetables, unless labeled sodium-free or low-sodium. [place] fries, pizza, tacos, and other fast foods. Pickles, olives, ketchup, and other condiments, especially soy sauce, unless labeled sodium-free or low-sodium. Where can you learn more? Go to http://www.gray.com/  Enter V843 in the search box to learn more about \"Low Sodium Diet (2,000 Milligram): Care Instructions. \"  Current as of: August 22, 2019               Content Version: 12.6  © 6098-3082 Healthwise, Incorporated. Care instructions adapted under license by [employer] (which disclaims liability or warranty for this information). If you have questions about a medical condition or this instruction, always ask your healthcare professional. [address] any warranty or liability for your use of this information.

## 2023-02-28 NOTE — PROGRESS NOTES
Lashanda Randall presents today for   Chief Complaint   Patient presents with    Follow-up     ED follow up 2/19                 1. \"Have you been to the ER, urgent care clinic since your last visit? Hospitalized since your last visit? \" yes    2. \"Have you seen or consulted any other health care providers outside of the [address] [place] [address] since your last visit? \" no     3. For patients aged 39-70: Has the patient had a colonoscopy / FIT/ Cologuard? NA - based on age      If the patient is female:    4. For patients aged 41-77: Has the patient had a mammogram within the past 2 years? Yes - no Care Gap present      5. For patients aged 21-65: Has the patient had a pap smear?  Not recently

## 2023-03-03 PROBLEM — E78.00 PURE HYPERCHOLESTEROLEMIA: Status: RESOLVED | Noted: 2023-03-03 | Resolved: 2023-03-03

## 2023-03-03 PROBLEM — G43.009 MIGRAINE WITHOUT AURA OR STATUS MIGRAINOSUS: Status: ACTIVE | Noted: 2022-05-15

## 2023-03-03 PROBLEM — E78.00 PURE HYPERCHOLESTEROLEMIA: Status: ACTIVE | Noted: 2023-03-03

## 2023-03-20 DIAGNOSIS — F98.8 ATTENTION DEFICIT DISORDER, UNSPECIFIED HYPERACTIVITY PRESENCE: ICD-10-CM

## 2023-03-20 RX ORDER — MONTELUKAST SODIUM 10 MG/1
TABLET ORAL
Qty: 90 TABLET | Refills: 3 | Status: SHIPPED | OUTPATIENT
Start: 2023-03-20

## 2023-03-20 RX ORDER — DEXTROAMPHETAMINE SACCHARATE, AMPHETAMINE ASPARTATE MONOHYDRATE, DEXTROAMPHETAMINE SULFATE AND AMPHETAMINE SULFATE 6.25; 6.25; 6.25; 6.25 MG/1; MG/1; MG/1; MG/1
25 CAPSULE, EXTENDED RELEASE ORAL EVERY MORNING
Qty: 30 CAPSULE | Refills: 0 | Status: SHIPPED | OUTPATIENT
Start: 2023-03-20 | End: 2023-04-19

## 2023-03-20 NOTE — TELEPHONE ENCOUNTER
Last Fill per chart: 2/17/23 30 D/S  CSA Signed: 12/9/22  Last UDS: 12/9/22    PCP: Laurie Torres MD    Last appt: [unfilled]  Future Appointments   Date Time Provider Maddi Maravilla   8/21/2023 10:20 AM IO LAB VISIT [facility] AMB   8/29/2023 10:30 AM Laurie Torres MD [facility] AMB       Requested Prescriptions     Pending Prescriptions Disp Refills    amphetamine-dextroamphetamine (ADDERALL XR) 25 MG extended release capsule 30 capsule 0     Sig: Take 1 capsule by mouth every morning for 30 days.  Max Daily Amount: 25 mg

## 2023-03-20 NOTE — TELEPHONE ENCOUNTER
Reviewed report generated by the [facility]. Does not demonstrate aberrancies or inconsistencies with regard to the prescribing of controlled medications to this patient by other providers. Last filled 2/17/2023 per .

## 2023-04-17 DIAGNOSIS — F98.8 ATTENTION DEFICIT DISORDER, UNSPECIFIED HYPERACTIVITY PRESENCE: ICD-10-CM

## 2023-04-17 RX ORDER — DEXTROAMPHETAMINE SACCHARATE, AMPHETAMINE ASPARTATE MONOHYDRATE, DEXTROAMPHETAMINE SULFATE AND AMPHETAMINE SULFATE 6.25; 6.25; 6.25; 6.25 MG/1; MG/1; MG/1; MG/1
25 CAPSULE, EXTENDED RELEASE ORAL EVERY MORNING
Qty: 30 CAPSULE | Refills: 0 | Status: SHIPPED | OUTPATIENT
Start: 2023-04-17 | End: 2023-05-17

## 2023-04-17 NOTE — TELEPHONE ENCOUNTER
Reviewed report generated by the [facility]. Does not demonstrate aberrancies or inconsistencies with regard to the prescribing of controlled medications to this patient by other providers. Last filled 3/20/2023 per .

## 2023-05-05 DIAGNOSIS — Z11.4 ENCOUNTER FOR SCREENING FOR HUMAN IMMUNODEFICIENCY VIRUS (HIV): ICD-10-CM

## 2023-05-05 DIAGNOSIS — D50.0 IRON DEFICIENCY ANEMIA DUE TO CHRONIC BLOOD LOSS: ICD-10-CM

## 2023-05-05 DIAGNOSIS — I10 PRIMARY HYPERTENSION: ICD-10-CM

## 2023-05-05 DIAGNOSIS — E55.9 VITAMIN D DEFICIENCY: ICD-10-CM

## 2023-05-05 DIAGNOSIS — Z00.00 LABORATORY TESTS ORDERED AS PART OF A COMPLETE PHYSICAL EXAM (CPE): ICD-10-CM

## 2023-05-15 DIAGNOSIS — F98.8 ATTENTION DEFICIT DISORDER, UNSPECIFIED HYPERACTIVITY PRESENCE: ICD-10-CM

## 2023-05-15 RX ORDER — DEXTROAMPHETAMINE SACCHARATE, AMPHETAMINE ASPARTATE MONOHYDRATE, DEXTROAMPHETAMINE SULFATE AND AMPHETAMINE SULFATE 6.25; 6.25; 6.25; 6.25 MG/1; MG/1; MG/1; MG/1
25 CAPSULE, EXTENDED RELEASE ORAL EVERY MORNING
Qty: 30 CAPSULE | Refills: 0 | Status: SHIPPED | OUTPATIENT
Start: 2023-05-15 | End: 2023-06-14

## 2023-05-15 NOTE — TELEPHONE ENCOUNTER
Reviewed report generated by the [facility]. Does not demonstrate aberrancies or inconsistencies with regard to the prescribing of controlled medications to this patient by other providers. Last filled 4/17/2023 per .

## 2023-07-12 RX ORDER — CARBAMAZEPINE 200 MG/1
TABLET ORAL
Qty: 270 TABLET | Refills: 1 | Status: SHIPPED | OUTPATIENT
Start: 2023-07-12

## 2023-07-24 DIAGNOSIS — F98.8 ATTENTION DEFICIT DISORDER, UNSPECIFIED HYPERACTIVITY PRESENCE: ICD-10-CM

## 2023-07-24 RX ORDER — DEXTROAMPHETAMINE SACCHARATE, AMPHETAMINE ASPARTATE MONOHYDRATE, DEXTROAMPHETAMINE SULFATE AND AMPHETAMINE SULFATE 6.25; 6.25; 6.25; 6.25 MG/1; MG/1; MG/1; MG/1
25 CAPSULE, EXTENDED RELEASE ORAL EVERY MORNING
Qty: 30 CAPSULE | Refills: 0 | Status: SHIPPED | OUTPATIENT
Start: 2023-07-24 | End: 2023-08-23

## 2023-07-24 NOTE — TELEPHONE ENCOUNTER
Reviewed report generated by the [facility]. Does not demonstrate aberrancies or inconsistencies with regard to the prescribing of controlled medications to this patient by other providers. Last filled 6/19/2023 per .

## 2023-07-24 NOTE — TELEPHONE ENCOUNTER
PCP: Adithya Irizarry MD    Previous refill per chart  amphetamine-dextroamphetamine (ADDERALL XR) 25 MG extended release capsule 30 capsule 0 6/12/2023 7/12/2023    Sig - Route: Take 1 capsule by mouth every morning for 30 days. Max Daily Amount: 25 mg - Oral    Sent to pharmacy as: Amphetamine-Dextroamphet ER 25 MG Oral Capsule Extended Release 24 Hour (ADDERALL XR)    Earliest Fill Date: 6/12/2023    Notes to Pharmacy: Do not fill until 6/18/2023    E-Prescribing Status: Receipt confirmed by pharmacy (6/12/2023  2:19 PM EDT)      CSA Signed: 12/9/22  Last UDS: 12/9/22    Last appt:    Future Appointments   Date Time Provider [address] [place] [address]   8/21/2023 10:20 AM IO LAB VISIT [facility] [place] [facility]   8/29/2023 10:30 AM Adithya Irizarry MD IO BS AMB

## 2023-08-21 ENCOUNTER — NURSE ONLY (OUTPATIENT)
Age: 43
End: 2023-08-21

## 2023-08-21 DIAGNOSIS — E78.2 MIXED HYPERLIPIDEMIA: ICD-10-CM

## 2023-08-21 DIAGNOSIS — D50.0 IRON DEFICIENCY ANEMIA DUE TO CHRONIC BLOOD LOSS: ICD-10-CM

## 2023-08-21 DIAGNOSIS — E55.9 VITAMIN D DEFICIENCY, UNSPECIFIED: ICD-10-CM

## 2023-08-21 DIAGNOSIS — F98.8 ATTENTION DEFICIT DISORDER, UNSPECIFIED HYPERACTIVITY PRESENCE: ICD-10-CM

## 2023-08-21 DIAGNOSIS — Z00.00 LABORATORY TESTS ORDERED AS PART OF A COMPLETE PHYSICAL EXAM (CPE): ICD-10-CM

## 2023-08-22 LAB
A/G RATIO: 1.8 RATIO (ref 1.1–2.6)
ALBUMIN SERPL-MCNC: 4.3 G/DL (ref 3.5–5)
ALP BLD-CCNC: 105 U/L (ref 25–115)
ALT SERPL-CCNC: 13 U/L (ref 5–40)
ANION GAP SERPL CALCULATED.3IONS-SCNC: 8 MMOL/L (ref 3–15)
AST SERPL-CCNC: 13 U/L (ref 10–37)
BASOPHILS # BLD: 1 % (ref 0–2)
BASOPHILS ABSOLUTE: 0 K/UL (ref 0–0.2)
BILIRUB SERPL-MCNC: 0.2 MG/DL (ref 0.2–1.2)
BUN BLDV-MCNC: 18 MG/DL (ref 6–22)
CALCIUM SERPL-MCNC: 9.4 MG/DL (ref 8.4–10.5)
CHLORIDE BLD-SCNC: 101 MMOL/L (ref 98–110)
CHOLESTEROL/HDL RATIO: 6.5 (ref 0–5)
CHOLESTEROL: 286 MG/DL (ref 110–200)
CO2: 30 MMOL/L (ref 20–32)
CREAT SERPL-MCNC: 0.9 MG/DL (ref 0.5–1.2)
EOSINOPHIL # BLD: 1 % (ref 0–6)
EOSINOPHILS ABSOLUTE: 0.1 K/UL (ref 0–0.5)
FERRITIN: 10 NG/ML (ref 10–291)
GLOBULIN: 2.4 G/DL (ref 2–4)
GLOMERULAR FILTRATION RATE: >60 ML/MIN/1.73 SQ.M.
GLUCOSE: 94 MG/DL (ref 70–99)
HCT VFR BLD CALC: 41.4 % (ref 35.1–46.5)
HDLC SERPL-MCNC: 44 MG/DL
HEMOGLOBIN: 12.3 G/DL (ref 11.7–15.5)
LDL CHOLESTEROL CALCULATED: 210 MG/DL (ref 50–99)
LDL/HDL RATIO: 4.8
LYMPHOCYTES # BLD: 37 % (ref 20–45)
LYMPHOCYTES ABSOLUTE: 2.1 K/UL (ref 1–4.8)
MCH RBC QN AUTO: 27 PG (ref 26–34)
MCHC RBC AUTO-ENTMCNC: 30 G/DL (ref 31–36)
MCV RBC AUTO: 89 FL (ref 80–99)
MONOCYTES ABSOLUTE: 0.3 K/UL (ref 0.1–1)
MONOCYTES: 6 % (ref 3–12)
NEUTROPHILS ABSOLUTE: 3.3 K/UL (ref 1.8–7.7)
NEUTROPHILS: 56 % (ref 40–75)
NON-HDL CHOLESTEROL: 242 MG/DL
PDW BLD-RTO: 14.5 % (ref 10–15.5)
PLATELET # BLD: 295 K/UL (ref 140–440)
PMV BLD AUTO: 11.1 FL (ref 9–13)
POTASSIUM SERPL-SCNC: 4.5 MMOL/L (ref 3.5–5.5)
RBC: 4.65 M/UL (ref 3.8–5.2)
SODIUM BLD-SCNC: 139 MMOL/L (ref 133–145)
TOTAL PROTEIN: 6.7 G/DL (ref 6.4–8.3)
TRIGL SERPL-MCNC: 159 MG/DL (ref 40–149)
VITAMIN D 25-HYDROXY: 32.3 NG/ML (ref 32–100)
VLDLC SERPL CALC-MCNC: 32 MG/DL (ref 8–30)
WBC: 5.8 K/UL (ref 4–11)

## 2023-08-22 RX ORDER — DEXTROAMPHETAMINE SACCHARATE, AMPHETAMINE ASPARTATE MONOHYDRATE, DEXTROAMPHETAMINE SULFATE AND AMPHETAMINE SULFATE 6.25; 6.25; 6.25; 6.25 MG/1; MG/1; MG/1; MG/1
25 CAPSULE, EXTENDED RELEASE ORAL EVERY MORNING
Qty: 30 CAPSULE | Refills: 0 | Status: SHIPPED | OUTPATIENT
Start: 2023-08-22 | End: 2023-09-21

## 2023-08-22 RX ORDER — SERTRALINE HYDROCHLORIDE 100 MG/1
100 TABLET, FILM COATED ORAL DAILY
Qty: 90 TABLET | Refills: 1 | Status: SHIPPED | OUTPATIENT
Start: 2023-08-22

## 2023-08-22 NOTE — TELEPHONE ENCOUNTER
PCP: Joy Lowry MD    Last refill per chart:  amphetamine-dextroamphetamine (ADDERALL XR) 25 MG extended release capsule 25 mg, Oral, EVERY MORNING EditCancel Reorder       Summary: Take 1 capsule by mouth every morning for 30 days. Max Daily Amount: 25 mg, Disp-30 capsule, R-0  Normal   Dose, Frequency: 25 mg, EVERY MORNING  Start: 7/24/2023  End: 8/23/2023  Ord/Sold: 7/24/2023 (O)  Report  Taking:   Long-term:   Pharmacy: [address] [phone] - F [phone]  Med Dose History       Patient Sig: Take 1 capsule by mouth every morning for 30 days.  Max Daily Amount: 25 mg       Ordered on: 7/24/2023       Authorized by: Fco Dies: 30 capsule       Refills: 0 ordered       Note to Pharmacy: Do not fill until 6/18/2023         sertraline (ZOLOFT) 100 MG tablet 100 mg, Oral, DAILY EditCancel Reorder      Summary: Take 1 tablet by mouth daily, Disp-90 tablet, R-1  Normal   Dose, Frequency: 100 mg, DAILY  Start: 2/28/2023  Ord/Sold: 2/28/2023 (O)  Report  Taking:   Long-term:   Pharmacy: [address] [phone] Pina Goodwin [phone]  Med Dose History       Patient Sig: Take 1 tablet by mouth daily       Ordered on: 2/28/2023       Authorized by: Fco Dies: 90 tablet       Refills: 1 ordered          Future Appointments   Date Time Provider [address] [place] [address]   8/29/2023 10:30 AM Joy Lowry MD [facility] AMB

## 2023-08-29 ENCOUNTER — OFFICE VISIT (OUTPATIENT)
Age: 43
End: 2023-08-29
Payer: COMMERCIAL

## 2023-08-29 VITALS
TEMPERATURE: 98.2 F | BODY MASS INDEX: 24.73 KG/M2 | RESPIRATION RATE: 16 BRPM | HEIGHT: 69 IN | HEART RATE: 68 BPM | OXYGEN SATURATION: 96 % | WEIGHT: 167 LBS | DIASTOLIC BLOOD PRESSURE: 76 MMHG | SYSTOLIC BLOOD PRESSURE: 124 MMHG

## 2023-08-29 DIAGNOSIS — F98.8 ATTENTION DEFICIT DISORDER, UNSPECIFIED HYPERACTIVITY PRESENCE: ICD-10-CM

## 2023-08-29 DIAGNOSIS — Z00.00 LABORATORY TESTS ORDERED AS PART OF A COMPLETE PHYSICAL EXAM (CPE): ICD-10-CM

## 2023-08-29 DIAGNOSIS — F41.9 ANXIETY: ICD-10-CM

## 2023-08-29 DIAGNOSIS — F41.0 PANIC DISORDER: ICD-10-CM

## 2023-08-29 DIAGNOSIS — D50.0 IRON DEFICIENCY ANEMIA DUE TO CHRONIC BLOOD LOSS: ICD-10-CM

## 2023-08-29 DIAGNOSIS — G43.009 MIGRAINE WITHOUT AURA AND WITHOUT STATUS MIGRAINOSUS, NOT INTRACTABLE: ICD-10-CM

## 2023-08-29 DIAGNOSIS — I47.1 PAROXYSMAL SVT (SUPRAVENTRICULAR TACHYCARDIA) (HCC): ICD-10-CM

## 2023-08-29 DIAGNOSIS — E55.9 VITAMIN D DEFICIENCY: ICD-10-CM

## 2023-08-29 DIAGNOSIS — G50.0 TRIGEMINAL NEURALGIA: ICD-10-CM

## 2023-08-29 DIAGNOSIS — E78.2 MIXED HYPERLIPIDEMIA: ICD-10-CM

## 2023-08-29 DIAGNOSIS — N92.0 MENORRHAGIA WITH REGULAR CYCLE: ICD-10-CM

## 2023-08-29 DIAGNOSIS — I10 PRIMARY HYPERTENSION: Primary | ICD-10-CM

## 2023-08-29 PROCEDURE — 3074F SYST BP LT 130 MM HG: CPT | Performed by: INTERNAL MEDICINE

## 2023-08-29 PROCEDURE — 99214 OFFICE O/P EST MOD 30 MIN: CPT | Performed by: INTERNAL MEDICINE

## 2023-08-29 PROCEDURE — 3078F DIAST BP <80 MM HG: CPT | Performed by: INTERNAL MEDICINE

## 2023-08-29 RX ORDER — SERTRALINE HYDROCHLORIDE 100 MG/1
150 TABLET, FILM COATED ORAL DAILY
Qty: 135 TABLET | Refills: 3 | Status: SHIPPED | OUTPATIENT
Start: 2023-08-29

## 2023-08-29 RX ORDER — CARBAMAZEPINE 200 MG/1
200 TABLET ORAL 2 TIMES DAILY
Qty: 1 TABLET | Refills: 0
Start: 2023-08-29

## 2023-08-29 SDOH — ECONOMIC STABILITY: HOUSING INSECURITY
IN THE LAST 12 MONTHS, WAS THERE A TIME WHEN YOU DID NOT HAVE A STEADY PLACE TO SLEEP OR SLEPT IN A SHELTER (INCLUDING NOW)?: NO

## 2023-08-29 SDOH — ECONOMIC STABILITY: TRANSPORTATION INSECURITY
IN THE PAST 12 MONTHS, HAS LACK OF TRANSPORTATION KEPT YOU FROM MEETINGS, WORK, OR FROM GETTING THINGS NEEDED FOR DAILY LIVING?: NO

## 2023-08-29 SDOH — ECONOMIC STABILITY: INCOME INSECURITY: HOW HARD IS IT FOR YOU TO PAY FOR THE VERY BASICS LIKE FOOD, HOUSING, MEDICAL CARE, AND HEATING?: NOT VERY HARD

## 2023-08-29 SDOH — ECONOMIC STABILITY: FOOD INSECURITY: WITHIN THE PAST 12 MONTHS, YOU WORRIED THAT YOUR FOOD WOULD RUN OUT BEFORE YOU GOT MONEY TO BUY MORE.: NEVER TRUE

## 2023-08-29 SDOH — ECONOMIC STABILITY: FOOD INSECURITY: WITHIN THE PAST 12 MONTHS, THE FOOD YOU BOUGHT JUST DIDN'T LAST AND YOU DIDN'T HAVE MONEY TO GET MORE.: NEVER TRUE

## 2023-08-29 ASSESSMENT — PATIENT HEALTH QUESTIONNAIRE - PHQ9
SUM OF ALL RESPONSES TO PHQ QUESTIONS 1-9: 0
2. FEELING DOWN, DEPRESSED OR HOPELESS: 0
SUM OF ALL RESPONSES TO PHQ QUESTIONS 1-9: 0
1. LITTLE INTEREST OR PLEASURE IN DOING THINGS: 0
SUM OF ALL RESPONSES TO PHQ9 QUESTIONS 1 & 2: 0

## 2023-08-29 NOTE — PROGRESS NOTES
HPI:   L[place] Lundborg is a 40y.o. year old female who presents today for a routine visit. She has a history of hyperlipidemia, allergic rhinitis, attention deficit disorder, Raynaud's phenomenon, migraine headaches, and chronic neck and back pain. She completed two doses of the Pfizer COVID-19 vaccine series but has not received any booster doses. She reports that she is doing reasonably well. She was last seen on 2/28/2023, and reported experiencing increased anxiety and panic attacks, and she described trying mindful methods to abort the attacks including specific music and interactions with her dog. Her dose of sertraline was increased from 50 mg to 100 mg daily, and she reports today noting some improvement. She states that the frequency of the panic attack episodes are less and they appear to be somewhat less severe. She is continuing to attempt mindfulness and soothing activities to help control. She states that now that she knows that the episodes are due to her anxiety, she feels that she is better able to control them. She reports that she has been tolerating propranolol XL 80 mg daily which was started at her last visit to help with blood pressure control and minimization of her migraine headaches. She states that she has noted improvement since initiating with decreased frequency of migraine headaches. She states that her blood pressure also is well controlled. She reports continued good control of her trigeminal neuralgia with treatment of carbamazepine 200 mg twice daily and gabapentin 600 mg nightly. She has not had a follow-up visit with Dr. Silverio Devine but is planning to schedule. She does acknowledge that she is not currently exercising due to allergies when going outside during the summer. She does also admit to some poor dietary choices but has been attempting to improve. She is otherwise without new complaints.       Summary of prior hospitalizations and medical

## 2023-08-29 NOTE — PROGRESS NOTES
Larry Alicea presents today for   Chief Complaint   Patient presents with    Follow-up     6 month follow up       1. \"Have you been to the ER, urgent care clinic since your last visit? Hospitalized since your last visit? \" no    2. \"Have you seen or consulted any other health care providers outside of the [address] [place] [address] since your last visit? \" no     3. For patients aged 43-73: Has the patient had a colonoscopy / FIT/ Cologuard? NA - based on age      If the patient is female:    4. For patients aged 43-66: Has the patient had a mammogram within the past 2 years? Yes - no Care Gap present      5. For patients aged 21-65: Has the patient had a pap smear? Yes - Care Gap present.  Most recent result on file

## 2023-09-02 PROBLEM — G50.0 TRIGEMINAL NEURALGIA: Status: ACTIVE | Noted: 2023-09-02

## 2023-09-02 RX ORDER — FERROUS SULFATE 325(65) MG
325 TABLET ORAL
COMMUNITY

## 2023-09-08 ENCOUNTER — PATIENT MESSAGE (OUTPATIENT)
Age: 43
End: 2023-09-08

## 2023-09-08 DIAGNOSIS — I47.1 PAROXYSMAL SVT (SUPRAVENTRICULAR TACHYCARDIA) (HCC): Primary | ICD-10-CM

## 2023-09-13 RX ORDER — PROPRANOLOL HYDROCHLORIDE 80 MG/1
80 CAPSULE, EXTENDED RELEASE ORAL DAILY
Qty: 90 CAPSULE | Refills: 3 | Status: SHIPPED | OUTPATIENT
Start: 2023-09-13

## 2023-09-13 NOTE — TELEPHONE ENCOUNTER
PCP: Jesús Lund MD    Last refill per chart:   propranolol (INDERAL LA) 80 MG extended release capsule 80 mg, Oral, DAILY EditCancel Reorder      Summary: Take 1 capsule by mouth daily, Disp-90 capsule, R-1  Normal   Dose, Frequency: 80 mg, DAILY  Start: 2/28/2023  Ord/Sold: 2/28/2023 (O)  Report  Taking:   Long-term:   Pharmacy: [address] [phone] Oly Benavides [phone]  Med Dose History       Patient Sig: Take 1 capsule by mouth daily       Ordered on: 2/28/2023       Authorized by: Andrzej Cordoba: 90 capsule       Refills: 1 ordered          Future Appointments   Date Time Provider [address] [place] [address]   2/21/2024  9:15 AM IO LAB VISIT [facility] BS AMB   2/28/2024 10:30 AM Jesús Lund MD IO [facility] AMB

## 2023-09-27 DIAGNOSIS — F98.8 ATTENTION DEFICIT DISORDER, UNSPECIFIED HYPERACTIVITY PRESENCE: ICD-10-CM

## 2023-09-27 NOTE — TELEPHONE ENCOUNTER
PCP: Adithya Irizarry MD    LAST REFILL PER CHART:  amphetamine-dextroamphetamine (ADDERALL XR) 25 MG extended release capsule () 25 mg, Oral, EVERY MORNING EditCancel Reorder       Summary: Take 1 capsule by mouth every morning for 30 days. Max Daily Amount: 25 mg, Disp-30 capsule, R-0  Normal   Dose, Frequency: 25 mg, EVERY MORNING  Start: 2023  End: 2023  Ord/Sold: 2023 (O)  Report  Taking:   Long-term:   Pharmacy: [place] [phone] - F [phone]  Med Dose History       Patient Sig: Take 1 capsule by mouth every morning for 30 days.  Max Daily Amount: 25 mg       Ordered on: 2023       Authorized by: Marty Vaz       Dispense: 30 capsule       Refills: 0 ordered          Future Appointments   Date Time Provider [address] [place] [address]   2024  9:15 AM IO LAB VISIT [facility] [place] [facility]   2024 10:30 AM Adithya Irizarry MD [facility] [place]

## 2023-09-29 RX ORDER — DEXTROAMPHETAMINE SACCHARATE, AMPHETAMINE ASPARTATE MONOHYDRATE, DEXTROAMPHETAMINE SULFATE AND AMPHETAMINE SULFATE 6.25; 6.25; 6.25; 6.25 MG/1; MG/1; MG/1; MG/1
25 CAPSULE, EXTENDED RELEASE ORAL EVERY MORNING
Qty: 30 CAPSULE | Refills: 0 | Status: SHIPPED | OUTPATIENT
Start: 2023-09-29 | End: 2023-10-29

## 2023-09-29 NOTE — TELEPHONE ENCOUNTER
Reviewed report generated by the [facility]. Does not demonstrate aberrancies or inconsistencies with regard to the prescribing of controlled medications to this patient by other providers. Last filled 8/22/2023 per .      CSA Signed: 12/9/22   Last UDS: 12/9/22

## 2023-10-26 RX ORDER — SERTRALINE HYDROCHLORIDE 100 MG/1
150 TABLET, FILM COATED ORAL DAILY
Qty: 135 TABLET | Refills: 3 | Status: CANCELLED | OUTPATIENT
Start: 2023-10-26

## 2023-11-03 DIAGNOSIS — F98.8 ATTENTION DEFICIT DISORDER, UNSPECIFIED HYPERACTIVITY PRESENCE: ICD-10-CM

## 2023-11-06 DIAGNOSIS — F98.8 ATTENTION DEFICIT DISORDER, UNSPECIFIED HYPERACTIVITY PRESENCE: ICD-10-CM

## 2023-11-07 RX ORDER — DEXTROAMPHETAMINE SACCHARATE, AMPHETAMINE ASPARTATE MONOHYDRATE, DEXTROAMPHETAMINE SULFATE AND AMPHETAMINE SULFATE 6.25; 6.25; 6.25; 6.25 MG/1; MG/1; MG/1; MG/1
25 CAPSULE, EXTENDED RELEASE ORAL EVERY MORNING
Qty: 30 CAPSULE | Refills: 0 | OUTPATIENT
Start: 2023-11-07 | End: 2023-12-07

## 2023-11-07 RX ORDER — DEXTROAMPHETAMINE SACCHARATE, AMPHETAMINE ASPARTATE MONOHYDRATE, DEXTROAMPHETAMINE SULFATE AND AMPHETAMINE SULFATE 6.25; 6.25; 6.25; 6.25 MG/1; MG/1; MG/1; MG/1
25 CAPSULE, EXTENDED RELEASE ORAL EVERY MORNING
Qty: 30 CAPSULE | Refills: 0 | Status: SHIPPED | OUTPATIENT
Start: 2023-11-07 | End: 2023-12-07

## 2023-11-07 NOTE — TELEPHONE ENCOUNTER
Please refill if appropriate or refuse medication if not appropriate. PCP: Barbara Izquierdo MD     Last appt: 8/29/23    Last refill: 9/29/23      Future Appointments   Date Time Provider [address]   2/21/2024  9:15 AM [facility] LAB VISIT [facility] BS AMB   2/28/2024 10:30 AM Barbara Izquierdo MD [facility] BS AMB         Requested Prescriptions     Pending Prescriptions Disp Refills    amphetamine-dextroamphetamine (ADDERALL XR) 25 MG extended release capsule 30 capsule 0     Sig: Take 1 capsule by mouth every morning for 30 days.  Max Daily Amount: 25 mg

## 2023-11-07 NOTE — TELEPHONE ENCOUNTER
Reviewed report generated by the [facility]. Does not demonstrate aberrancies or inconsistencies with regard to the prescribing of controlled medications to this patient by other providers. Last filled 9/29/2023 per .      CSA Signed: 12/9/22   Last UDS: 12/9/22

## 2023-11-07 NOTE — TELEPHONE ENCOUNTER
PCP: Warren Curran MD  CSA: 2022  UDS: 2022  LAST REFILL PER CHART:   amphetamine-dextroamphetamine (ADDERALL XR) 25 MG extended release capsule ()        Summary: Take 1 capsule by mouth every morning for 30 days. Max Daily Amount: 25 mg, Disp-30 capsule, R-0  Dose, Frequency: 25 mg, EVERY MORNING  Start: 2023  End: 10/29/2023  Pharmacy: [address] [phone] -  [phone]  Med Dose History       Patient Sig: Take 1 capsule by mouth every morning for 30 days. Max Daily Amount: 25 mg       Ordered on: 2023       Authorized by: WARREN CURRAN       Dispense: 30 capsule       Refills: 0 ordered       Note to Pharmacy: Do not fill until 2023          Future Appointments   Date Time Provider Department Center   2024  9:15 AM [facility] LAB VISIT [facility] BS AMB   2024 10:30 AM Warren Curran MD [facility] BS AMB

## 2023-12-08 DIAGNOSIS — F98.8 ATTENTION DEFICIT DISORDER, UNSPECIFIED HYPERACTIVITY PRESENCE: ICD-10-CM

## 2023-12-11 RX ORDER — DEXTROAMPHETAMINE SACCHARATE, AMPHETAMINE ASPARTATE MONOHYDRATE, DEXTROAMPHETAMINE SULFATE AND AMPHETAMINE SULFATE 6.25; 6.25; 6.25; 6.25 MG/1; MG/1; MG/1; MG/1
25 CAPSULE, EXTENDED RELEASE ORAL EVERY MORNING
Qty: 30 CAPSULE | Refills: 0 | Status: SHIPPED | OUTPATIENT
Start: 2023-12-11 | End: 2024-01-10

## 2023-12-11 NOTE — TELEPHONE ENCOUNTER
Reviewed report generated by the [facility]. Does not demonstrate aberrancies or inconsistencies with regard to the prescribing of controlled medications to this patient by other providers. Last filled 11/7/2023 per .      CSA Signed: 12/9/22   Last UDS: 12/9/22

## 2024-01-07 DIAGNOSIS — F98.8 ATTENTION DEFICIT DISORDER, UNSPECIFIED HYPERACTIVITY PRESENCE: ICD-10-CM

## 2024-01-07 DIAGNOSIS — Z79.899 MEDICATION MANAGEMENT: Primary | ICD-10-CM

## 2024-01-08 RX ORDER — DEXTROAMPHETAMINE SACCHARATE, AMPHETAMINE ASPARTATE MONOHYDRATE, DEXTROAMPHETAMINE SULFATE AND AMPHETAMINE SULFATE 6.25; 6.25; 6.25; 6.25 MG/1; MG/1; MG/1; MG/1
25 CAPSULE, EXTENDED RELEASE ORAL EVERY MORNING
Qty: 30 CAPSULE | Refills: 0 | Status: SHIPPED | OUTPATIENT
Start: 2024-01-08 | End: 2024-02-07

## 2024-01-08 NOTE — TELEPHONE ENCOUNTER
Reviewed report generated by the [place] [facility]. Does not demonstrate aberrancies or inconsistencies with regard to the prescribing of controlled medications to this patient by other providers.    Last filled 12/11/2023 per .     CSA Signed: 12/9/2022   Last UDS: 12/9/2022     Will update UDS/CSA at upcoming visit in 2/2024.

## 2024-01-09 ENCOUNTER — TELEPHONE (OUTPATIENT)
Age: 44
End: 2024-01-09

## 2024-01-09 NOTE — TELEPHONE ENCOUNTER
Called the patient and pts  to get scheduled with cardiology per the referral from the pcp's office. No answer, unable to leave vm. Phones may be disconnected.

## 2024-02-09 DIAGNOSIS — F98.8 ATTENTION DEFICIT DISORDER, UNSPECIFIED HYPERACTIVITY PRESENCE: ICD-10-CM

## 2024-02-09 RX ORDER — DEXTROAMPHETAMINE SACCHARATE, AMPHETAMINE ASPARTATE MONOHYDRATE, DEXTROAMPHETAMINE SULFATE AND AMPHETAMINE SULFATE 6.25; 6.25; 6.25; 6.25 MG/1; MG/1; MG/1; MG/1
25 CAPSULE, EXTENDED RELEASE ORAL EVERY MORNING
Qty: 30 CAPSULE | Refills: 0 | Status: SHIPPED | OUTPATIENT
Start: 2024-02-09 | End: 2024-03-10

## 2024-02-09 NOTE — TELEPHONE ENCOUNTER
[facility] 2/9/2024    The last fill date for Dextroamp-Amphet Er 25 Mg Cap  was 1/8/2024 for a 30 d/s qty 30      Last UDS: 1/8/2024    CSA Last signed:  12/9/2022        PCP: Amy Nolasco MD    Last appt: 8/29/2023  Future Appointments   Date Time Provider Department Center   2/21/2024  9:15 AM [facility] LAB VISIT [facility] BS AMB   2/28/2024 10:30 AM Amy Nolasco MD [facility] BS AMB       Requested Prescriptions     Pending Prescriptions Disp Refills    amphetamine-dextroamphetamine (ADDERALL XR) 25 MG extended release capsule 30 capsule 0     Sig: Take 1 capsule by mouth every morning for 30 days. Max Daily Amount: 25 mg

## 2024-02-15 ENCOUNTER — NURSE ONLY (OUTPATIENT)
Age: 44
End: 2024-02-15

## 2024-02-15 DIAGNOSIS — Z79.899 MEDICATION MANAGEMENT: ICD-10-CM

## 2024-02-15 DIAGNOSIS — F98.8 ATTENTION DEFICIT DISORDER, UNSPECIFIED HYPERACTIVITY PRESENCE: ICD-10-CM

## 2024-02-16 LAB
A/G RATIO: 1.6 RATIO (ref 1.1–2.6)
ALBUMIN SERPL-MCNC: 4.1 G/DL (ref 3.5–5)
ALP BLD-CCNC: 88 U/L (ref 25–115)
ALT SERPL-CCNC: 12 U/L (ref 5–40)
ANION GAP SERPL CALCULATED.3IONS-SCNC: 8 MMOL/L (ref 3–15)
AST SERPL-CCNC: 13 U/L (ref 10–37)
BASOPHILS # BLD: 1 % (ref 0–2)
BASOPHILS ABSOLUTE: 0 K/UL (ref 0–0.2)
BILIRUB SERPL-MCNC: 0.3 MG/DL (ref 0.2–1.2)
BUN BLDV-MCNC: 13 MG/DL (ref 6–22)
CALCIUM SERPL-MCNC: 9.4 MG/DL (ref 8.4–10.5)
CHLORIDE BLD-SCNC: 105 MMOL/L (ref 98–110)
CHOLESTEROL/HDL RATIO: 5.8 (ref 0–5)
CHOLESTEROL: 269 MG/DL (ref 110–200)
CO2: 28 MMOL/L (ref 20–32)
CREAT SERPL-MCNC: 0.9 MG/DL (ref 0.5–1.2)
EOSINOPHIL # BLD: 2 % (ref 0–6)
EOSINOPHILS ABSOLUTE: 0.1 K/UL (ref 0–0.5)
FERRITIN: 7 NG/ML (ref 10–291)
GLOBULIN: 2.5 G/DL (ref 2–4)
GLOMERULAR FILTRATION RATE: >60 ML/MIN/1.73 SQ.M.
GLUCOSE: 87 MG/DL (ref 70–99)
HCT VFR BLD CALC: 42.6 % (ref 35.1–46.5)
HDLC SERPL-MCNC: 46 MG/DL
HEMOGLOBIN: 12.6 G/DL (ref 11.7–15.5)
LDL CHOLESTEROL CALCULATED: 197 MG/DL (ref 50–99)
LDL/HDL RATIO: 4.3
LYMPHOCYTES # BLD: 34 % (ref 20–45)
LYMPHOCYTES ABSOLUTE: 2.3 K/UL (ref 1–4.8)
MCH RBC QN AUTO: 26 PG (ref 26–34)
MCHC RBC AUTO-ENTMCNC: 30 G/DL (ref 31–36)
MCV RBC AUTO: 88 FL (ref 80–99)
MONOCYTES ABSOLUTE: 0.4 K/UL (ref 0.1–1)
MONOCYTES: 6 % (ref 3–12)
NEUTROPHILS ABSOLUTE: 4 K/UL (ref 1.8–7.7)
NEUTROPHILS: 58 % (ref 40–75)
NON-HDL CHOLESTEROL: 223 MG/DL
PDW BLD-RTO: 14.8 % (ref 10–15.5)
PLATELET # BLD: 265 K/UL (ref 140–440)
PMV BLD AUTO: 11.5 FL (ref 9–13)
POTASSIUM SERPL-SCNC: 3.9 MMOL/L (ref 3.5–5.5)
RBC: 4.83 M/UL (ref 3.8–5.2)
SODIUM BLD-SCNC: 141 MMOL/L (ref 133–145)
TOTAL PROTEIN: 6.6 G/DL (ref 6.4–8.3)
TRIGL SERPL-MCNC: 127 MG/DL (ref 40–149)
TSH SERPL DL<=0.05 MIU/L-ACNC: 3.53 MCU/ML (ref 0.27–4.2)
VITAMIN D 25-HYDROXY: 34.6 NG/ML (ref 32–100)
VLDLC SERPL CALC-MCNC: 25 MG/DL (ref 8–30)
WBC: 6.9 K/UL (ref 4–11)

## 2024-02-21 DIAGNOSIS — I47.10 PAROXYSMAL SVT (SUPRAVENTRICULAR TACHYCARDIA): ICD-10-CM

## 2024-02-21 DIAGNOSIS — E78.2 MIXED HYPERLIPIDEMIA: ICD-10-CM

## 2024-02-21 DIAGNOSIS — Z00.00 LABORATORY TESTS ORDERED AS PART OF A COMPLETE PHYSICAL EXAM (CPE): ICD-10-CM

## 2024-02-21 DIAGNOSIS — D50.0 IRON DEFICIENCY ANEMIA DUE TO CHRONIC BLOOD LOSS: ICD-10-CM

## 2024-02-21 DIAGNOSIS — E55.9 VITAMIN D DEFICIENCY: ICD-10-CM

## 2024-02-21 DIAGNOSIS — I10 PRIMARY HYPERTENSION: ICD-10-CM

## 2024-02-22 LAB
AMPHETAMINE, URINE: 6400 NG/ML
AMPHETAMINES COMMENTS: ABNORMAL
AMPHETAMINES, URINE: POSITIVE NG/ML
BENZODIAZEPINES, URINE: NEGATIVE NG/ML
BUPRENORPHINE SCREEN, URINE: NEGATIVE NG/ML
COCAINE METABOLITES URINE: NEGATIVE NG/ML
CREATININE URINE: 248.7 MG/DL
DESMETHYLTRAMADOL, URINE: NEGATIVE NG/ML
FENTANYL COMMENTS: ABNORMAL
FENTANYL SCREEN, URINE: NEGATIVE NG/ML
Lab: NORMAL
MARIJUANA METABOLITE SCREEN URINE: NEGATIVE NG/ML
MEDMATCH AMPHETAMINES: ABNORMAL
MEDMATCH SUMMARY: ABNORMAL
METHADONE METABOLITE, UR: NEGATIVE NG/ML
METHAMPHETAMINE, URINE: NEGATIVE NG/ML
OPIATE SCREEN URINE: NEGATIVE NG/ML
OXIDANTS, URINE: NEGATIVE MCG/ML
OXYCODONE SCREEN URINE: NEGATIVE NG/ML
PH, URINE: 5.9 (ref 4.5–9)
TRAMADOL COMMENTS: ABNORMAL
TRAMADOL, URINE: NEGATIVE NG/ML

## 2024-02-28 ENCOUNTER — OFFICE VISIT (OUTPATIENT)
Age: 44
End: 2024-02-28
Payer: COMMERCIAL

## 2024-02-28 VITALS
WEIGHT: 169 LBS | HEART RATE: 65 BPM | OXYGEN SATURATION: 100 % | RESPIRATION RATE: 16 BRPM | BODY MASS INDEX: 25.03 KG/M2 | DIASTOLIC BLOOD PRESSURE: 68 MMHG | HEIGHT: 69 IN | SYSTOLIC BLOOD PRESSURE: 106 MMHG | TEMPERATURE: 98.5 F

## 2024-02-28 DIAGNOSIS — E78.2 MIXED HYPERLIPIDEMIA: ICD-10-CM

## 2024-02-28 DIAGNOSIS — E55.9 VITAMIN D DEFICIENCY: ICD-10-CM

## 2024-02-28 DIAGNOSIS — I10 PRIMARY HYPERTENSION: ICD-10-CM

## 2024-02-28 DIAGNOSIS — G43.009 MIGRAINE WITHOUT AURA AND WITHOUT STATUS MIGRAINOSUS, NOT INTRACTABLE: ICD-10-CM

## 2024-02-28 DIAGNOSIS — I47.10 PAROXYSMAL SVT (SUPRAVENTRICULAR TACHYCARDIA): ICD-10-CM

## 2024-02-28 DIAGNOSIS — F41.0 PANIC DISORDER: ICD-10-CM

## 2024-02-28 DIAGNOSIS — E61.1 IRON DEFICIENCY: ICD-10-CM

## 2024-02-28 DIAGNOSIS — G50.0 TRIGEMINAL NEURALGIA: ICD-10-CM

## 2024-02-28 DIAGNOSIS — F98.8 ATTENTION DEFICIT DISORDER, UNSPECIFIED HYPERACTIVITY PRESENCE: ICD-10-CM

## 2024-02-28 DIAGNOSIS — Z00.00 ENCOUNTER FOR ROUTINE HISTORY AND PHYSICAL EXAM IN FEMALE: Primary | ICD-10-CM

## 2024-02-28 PROCEDURE — 3074F SYST BP LT 130 MM HG: CPT | Performed by: INTERNAL MEDICINE

## 2024-02-28 PROCEDURE — 99396 PREV VISIT EST AGE 40-64: CPT | Performed by: INTERNAL MEDICINE

## 2024-02-28 PROCEDURE — 3078F DIAST BP <80 MM HG: CPT | Performed by: INTERNAL MEDICINE

## 2024-02-28 RX ORDER — CARBAMAZEPINE 200 MG/1
200 TABLET ORAL
Qty: 1 TABLET | Refills: 0
Start: 2024-02-28

## 2024-02-28 RX ORDER — RIMEGEPANT SULFATE 75 MG/75MG
TABLET, ORALLY DISINTEGRATING ORAL
COMMUNITY
Start: 2024-01-26

## 2024-02-28 RX ORDER — FERROUS SULFATE 325(65) MG
325 TABLET ORAL
Qty: 90 TABLET | Refills: 3 | Status: SHIPPED | OUTPATIENT
Start: 2024-02-28

## 2024-02-28 ASSESSMENT — PATIENT HEALTH QUESTIONNAIRE - PHQ9
SUM OF ALL RESPONSES TO PHQ9 QUESTIONS 1 & 2: 0
SUM OF ALL RESPONSES TO PHQ QUESTIONS 1-9: 0
1. LITTLE INTEREST OR PLEASURE IN DOING THINGS: 0
SUM OF ALL RESPONSES TO PHQ QUESTIONS 1-9: 0
SUM OF ALL RESPONSES TO PHQ QUESTIONS 1-9: 0
2. FEELING DOWN, DEPRESSED OR HOPELESS: 0
SUM OF ALL RESPONSES TO PHQ QUESTIONS 1-9: 0

## 2024-02-28 NOTE — PROGRESS NOTES
Eula Saldaña presents today for   Chief Complaint   Patient presents with    6 Month Follow-Up       \"Have you been to the ER, urgent care clinic since your last visit?  Hospitalized since your last visit?\"    NO    “Have you seen or consulted any other health care providers outside of [facility] since your last visit?”    NO        “Have you had a pap smear?”    NO         
intermittent palpitations for many years although reports Holter monitor in the past was negative.  On 9/18/2021, developed lightheadedness/ presyncope, and a narrow complex tachycardia at rate of 230 bpm was recorded on Apple Watch.  Spontaneously reverted to sinus rhythm and presented to [facility] ED and evaluation showed microcytic anemia (Hb 11.4/HCT 37.8 MCV 75.4) and normal electrolytes; EKG sinus rhythm at 99 bpm.  Has not had recurrence, although does show tracings today with several PACs.  Now on propranolol.  TSH normal today.  Aware that treatment with Adderall for ADHD may be exacerbating symptoms.  Now scheduled for evaluation with Dr. Osuna on 3/14/2024.  4. Trigeminal neuralgia.  Onset of severe left-sided facial pain in 7/2022 and diagnosed with trigeminal neuralgia.  Multiple ED visits with ongoing pain while titrating dose of Tegretol for control.  Underwent brain MRI/MRA (8/22/2022) without underlying structural cause.  Referred to Dr. Maher (8/11/2022) and felt that migraines were also contributing to headaches and gabapentin added to Tegretol with excellent control.  Continuing to follow with JUSTIN Muro with last visit on 1/26/2024 and continues on gabapentin 600 mg nightly and Tegretol 200 mg daily with good control.  Will continue to monitor.  5. Migraine headaches.  Left-sided headache initially felt to be secondary to trigeminal neuralgia but given ongoing difficulty thought to also be exacerbation of migraine headaches.  Noted improvement in frequency since initiated propranolol.  Also prescribed Nurtec by neurology to use as needed.  Will continue to monitor.  6. Iron deficiency anemia. Patient reported monthly menstrual cycle with heavy bleeding with clots for 4 days.  Evidence of anemia resolved today but with persistent iron deficiency with ferritin 7.  Not currently taking ferrous sulfate and advised to resume.  Will send a new prescription.  Also advised to schedule gynecologic exam

## 2024-03-02 PROBLEM — E66.3 OVERWEIGHT (BMI 25.0-29.9): Status: RESOLVED | Noted: 2022-05-15 | Resolved: 2024-03-02

## 2024-03-02 PROBLEM — E61.1 IRON DEFICIENCY: Status: ACTIVE | Noted: 2021-05-15

## 2024-03-10 DIAGNOSIS — F98.8 ATTENTION DEFICIT DISORDER, UNSPECIFIED HYPERACTIVITY PRESENCE: ICD-10-CM

## 2024-03-11 RX ORDER — DEXTROAMPHETAMINE SACCHARATE, AMPHETAMINE ASPARTATE MONOHYDRATE, DEXTROAMPHETAMINE SULFATE AND AMPHETAMINE SULFATE 6.25; 6.25; 6.25; 6.25 MG/1; MG/1; MG/1; MG/1
25 CAPSULE, EXTENDED RELEASE ORAL EVERY MORNING
Qty: 30 CAPSULE | Refills: 0 | Status: SHIPPED | OUTPATIENT
Start: 2024-03-11 | End: 2024-04-10

## 2024-03-11 NOTE — TELEPHONE ENCOUNTER
Reviewed report generated by the [place] [facility]. Does not demonstrate aberrancies or inconsistencies with regard to the prescribing of controlled medications to this patient by other providers.    Last filled 2/9/2024 per .     CSA Signed: 2/28/2024  Last UDS: 2/15/2024

## 2024-03-14 ENCOUNTER — OFFICE VISIT (OUTPATIENT)
Age: 44
End: 2024-03-14
Payer: COMMERCIAL

## 2024-03-14 VITALS
SYSTOLIC BLOOD PRESSURE: 120 MMHG | BODY MASS INDEX: 24.81 KG/M2 | WEIGHT: 168 LBS | DIASTOLIC BLOOD PRESSURE: 78 MMHG | OXYGEN SATURATION: 98 % | HEART RATE: 73 BPM

## 2024-03-14 DIAGNOSIS — I47.10 PAROXYSMAL SVT (SUPRAVENTRICULAR TACHYCARDIA): Primary | ICD-10-CM

## 2024-03-14 DIAGNOSIS — R07.9 CHEST PAIN, UNSPECIFIED TYPE: ICD-10-CM

## 2024-03-14 PROCEDURE — 99204 OFFICE O/P NEW MOD 45 MIN: CPT | Performed by: INTERNAL MEDICINE

## 2024-03-14 PROCEDURE — 93000 ELECTROCARDIOGRAM COMPLETE: CPT | Performed by: INTERNAL MEDICINE

## 2024-03-14 PROCEDURE — 3074F SYST BP LT 130 MM HG: CPT | Performed by: INTERNAL MEDICINE

## 2024-03-14 PROCEDURE — 3078F DIAST BP <80 MM HG: CPT | Performed by: INTERNAL MEDICINE

## 2024-03-14 NOTE — PROGRESS NOTES
HEENT: No congestion or recent URI.  Gastrointestinal: No nausea, vomiting, abdominal pain, bloody stools.  Pulmonary:  Negative except as stated above.  Cardiac:  Negative except as stated above.  Musculoskeletal: Negative except as stated above.  Neurological:  No localized symptoms.  Endocrine:  Negative except as stated above.    PHYSICAL EXAM  BP Readings from Last 3 Encounters:   03/14/24 120/78   02/28/24 106/68   08/29/23 124/76     Pulse Readings from Last 3 Encounters:   03/14/24 73   02/28/24 65   08/29/23 68     General:   Well developed, well groomed.    Head/Neck:   No obvious jugular venous distention     No obvious carotid pulsations.      No evidence of xanthelasma.  Lungs:   No respiratory distress.      Clear bilaterally.  Heart:  Regular rate and rhythm.    Abdomen:   Soft and nontender  Extremities:   Intact peripheral pulses.      No significant edema.    Neurological:   Alert and oriented to person, place, time.      No focal neurological deficit visually.  Skin:   No obvious rash

## 2024-04-14 DIAGNOSIS — F98.8 ATTENTION DEFICIT DISORDER, UNSPECIFIED HYPERACTIVITY PRESENCE: ICD-10-CM

## 2024-04-15 RX ORDER — DEXTROAMPHETAMINE SACCHARATE, AMPHETAMINE ASPARTATE MONOHYDRATE, DEXTROAMPHETAMINE SULFATE AND AMPHETAMINE SULFATE 6.25; 6.25; 6.25; 6.25 MG/1; MG/1; MG/1; MG/1
25 CAPSULE, EXTENDED RELEASE ORAL EVERY MORNING
Qty: 30 CAPSULE | Refills: 0 | Status: SHIPPED | OUTPATIENT
Start: 2024-04-15 | End: 2024-05-15

## 2024-05-19 DIAGNOSIS — F98.8 ATTENTION DEFICIT DISORDER, UNSPECIFIED HYPERACTIVITY PRESENCE: ICD-10-CM

## 2024-05-20 RX ORDER — DEXTROAMPHETAMINE SACCHARATE, AMPHETAMINE ASPARTATE MONOHYDRATE, DEXTROAMPHETAMINE SULFATE AND AMPHETAMINE SULFATE 6.25; 6.25; 6.25; 6.25 MG/1; MG/1; MG/1; MG/1
25 CAPSULE, EXTENDED RELEASE ORAL EVERY MORNING
Qty: 30 CAPSULE | Refills: 0 | Status: SHIPPED | OUTPATIENT
Start: 2024-05-20 | End: 2024-06-19

## 2024-05-20 NOTE — TELEPHONE ENCOUNTER
Reviewed report generated by the [place] [facility]. Does not demonstrate aberrancies or inconsistencies with regard to the prescribing of controlled medications to this patient by other providers.    Last filled 4/16/2024 per .     CSA Signed: 2/28/2024   Last UDS: 2/15/2024

## 2024-06-21 DIAGNOSIS — F98.8 ATTENTION DEFICIT DISORDER, UNSPECIFIED HYPERACTIVITY PRESENCE: ICD-10-CM

## 2024-06-21 NOTE — TELEPHONE ENCOUNTER
[facility]  [facility] reviewed 6/21/2024     The last fill date for Dextroamp-Amphet Er 25 Mg Cap  was 5/23/2024 for a 30 d/s qty 30      Last UDS: 2/15/2024    CSA Last signed: 12/9/2022        PCP: Amy Nolasco MD    Last appt: 2/28/2024    Future Appointments   Date Time Provider Department Center   8/22/2024 10:00 AM [facility] LAB VISIT [facility]   8/28/2024 11:00 AM Amy Nolasco MD [facility]   3/20/2025  2:00 PM Messi Osuna MD [facility] BS AMB       Requested Prescriptions     Pending Prescriptions Disp Refills    amphetamine-dextroamphetamine (ADDERALL XR) 25 MG extended release capsule 30 capsule 0     Sig: Take 1 capsule by mouth every morning for 30 days. Max Daily Amount: 25 mg

## 2024-06-22 RX ORDER — MONTELUKAST SODIUM 10 MG/1
10 TABLET ORAL DAILY
Qty: 90 TABLET | Refills: 3 | Status: SHIPPED | OUTPATIENT
Start: 2024-06-22

## 2024-06-24 RX ORDER — DEXTROAMPHETAMINE SACCHARATE, AMPHETAMINE ASPARTATE MONOHYDRATE, DEXTROAMPHETAMINE SULFATE AND AMPHETAMINE SULFATE 6.25; 6.25; 6.25; 6.25 MG/1; MG/1; MG/1; MG/1
25 CAPSULE, EXTENDED RELEASE ORAL EVERY MORNING
Qty: 30 CAPSULE | Refills: 0 | Status: SHIPPED | OUTPATIENT
Start: 2024-06-24 | End: 2024-07-24

## 2024-07-22 DIAGNOSIS — F98.8 ATTENTION DEFICIT DISORDER, UNSPECIFIED HYPERACTIVITY PRESENCE: ICD-10-CM

## 2024-07-23 RX ORDER — DEXTROAMPHETAMINE SACCHARATE, AMPHETAMINE ASPARTATE MONOHYDRATE, DEXTROAMPHETAMINE SULFATE AND AMPHETAMINE SULFATE 6.25; 6.25; 6.25; 6.25 MG/1; MG/1; MG/1; MG/1
25 CAPSULE, EXTENDED RELEASE ORAL EVERY MORNING
Qty: 30 CAPSULE | Refills: 0 | Status: SHIPPED | OUTPATIENT
Start: 2024-07-23 | End: 2024-08-22

## 2024-07-23 NOTE — TELEPHONE ENCOUNTER
Reviewed report generated by the [place] [facility]. Does not demonstrate aberrancies or inconsistencies with regard to the prescribing of controlled medications to this patient by other providers.    Last filled 6/24/2024 per .     CSA Signed: 2/28/2024   Last UDS: 2/15/2024

## 2024-07-23 NOTE — TELEPHONE ENCOUNTER
PCP: Amy Nolasco MD    CSA Signed: 2/28/2024   Last UDS: 2/15/2024     LAST REFILL PER CHART:  Medication:amphetamine-dextroamphetamine (ADDERALL XR) 25 MG extended release capsule   Ordered On:06/24/2024  Instructions:Take 1 capsule by mouth every morning for 30 days. Max Daily Amount: 25 mg   Dispense:30 capsules  Refills:0      Future Appointments   Date Time Provider Department Center   8/22/2024 10:00 AM [facility] LAB VISIT [facility] BS AMB   8/28/2024 11:00 AM Amy Nolasco MD [facility] BS AMB   3/20/2025  2:00 PM Messi Osuna MD [facility] BS AMB

## 2024-08-18 DIAGNOSIS — F98.8 ATTENTION DEFICIT DISORDER, UNSPECIFIED HYPERACTIVITY PRESENCE: ICD-10-CM

## 2024-08-19 RX ORDER — DEXTROAMPHETAMINE SACCHARATE, AMPHETAMINE ASPARTATE MONOHYDRATE, DEXTROAMPHETAMINE SULFATE AND AMPHETAMINE SULFATE 6.25; 6.25; 6.25; 6.25 MG/1; MG/1; MG/1; MG/1
25 CAPSULE, EXTENDED RELEASE ORAL EVERY MORNING
Qty: 30 CAPSULE | Refills: 0 | Status: SHIPPED | OUTPATIENT
Start: 2024-08-19 | End: 2024-09-18

## 2024-08-19 NOTE — TELEPHONE ENCOUNTER
PCP: Amy Nolasco MD    CSA Signed: 2/28/2024   Last UDS: 2/15/2024     [facility]  report reviewed    The last fill date for ADDERALL XR was 07/23/2024 for a 30 d/s qty 30      LAST REFILL PER CHART:  Medication:amphetamine-dextroamphetamine (ADDERALL XR) 25 MG extended release capsule   Ordered On:07/23/2024  Instructions: Take 1 capsule by mouth every morning for 30 days. Max Daily Amount: 25 mg   Dispense:30 capsules  Refills:      Future Appointments   Date Time Provider Department Center   8/22/2024 10:00 AM [facility] LAB VISIT [facility] DEP   8/28/2024 11:00 AM Amy Nolasco MD [facility] DEP   3/20/2025  2:00 PM Messi Osuna MD [facility] BS AMB

## 2024-09-16 DIAGNOSIS — F98.8 ATTENTION DEFICIT DISORDER, UNSPECIFIED HYPERACTIVITY PRESENCE: ICD-10-CM

## 2024-09-17 RX ORDER — DEXTROAMPHETAMINE SACCHARATE, AMPHETAMINE ASPARTATE MONOHYDRATE, DEXTROAMPHETAMINE SULFATE AND AMPHETAMINE SULFATE 6.25; 6.25; 6.25; 6.25 MG/1; MG/1; MG/1; MG/1
25 CAPSULE, EXTENDED RELEASE ORAL EVERY MORNING
Qty: 30 CAPSULE | Refills: 0 | Status: SHIPPED | OUTPATIENT
Start: 2024-09-17 | End: 2024-10-17

## 2024-09-17 RX ORDER — PROPRANOLOL HYDROCHLORIDE 80 MG/1
80 CAPSULE, EXTENDED RELEASE ORAL DAILY
Qty: 90 CAPSULE | Refills: 3 | Status: SHIPPED | OUTPATIENT
Start: 2024-09-17

## 2024-10-24 DIAGNOSIS — F98.8 ADD (ATTENTION DEFICIT DISORDER) WITHOUT HYPERACTIVITY: Primary | ICD-10-CM

## 2024-10-24 RX ORDER — DEXTROAMPHETAMINE SACCHARATE, AMPHETAMINE ASPARTATE MONOHYDRATE, DEXTROAMPHETAMINE SULFATE AND AMPHETAMINE SULFATE 6.25; 6.25; 6.25; 6.25 MG/1; MG/1; MG/1; MG/1
25 CAPSULE, EXTENDED RELEASE ORAL EVERY MORNING
Qty: 30 CAPSULE | Refills: 0 | Status: SHIPPED | OUTPATIENT
Start: 2024-10-24 | End: 2024-11-23

## 2024-10-24 NOTE — TELEPHONE ENCOUNTER
PCP: Amy Nolasco MD    LAST OFFICE VISIT: 02/28/2024    CSA Signed: 2/28/2024   Last UDS: 2/15/2024     [facility]  report reviewed    The last fill date was 09/17/2024 for a 30 d/s qty 30      LAST REFILL PER CHART:  Medication:amphetamine-dextroamphetamine (ADDERALL XR) 25 MG extended release capsule   Ordered On:09/17/2024  Instructions:Take 1 capsule by mouth every morning for 30 days. Max Daily Amount: 25 mg   Dispense:30 capsules  Refills:0      Future Appointments   Date Time Provider Department Center   3/20/2025  2:00 PM Messi Osuna MD [facility] BS AMB

## 2024-11-12 RX ORDER — SERTRALINE HYDROCHLORIDE 100 MG/1
150 TABLET, FILM COATED ORAL DAILY
Qty: 135 TABLET | Refills: 3 | Status: SHIPPED | OUTPATIENT
Start: 2024-11-12

## 2024-11-12 NOTE — TELEPHONE ENCOUNTER
PCP: Amy Nolasco MD    Last appt:  2/8/2024      Future Appointments   Date Time Provider Department Center   3/20/2025  2:00 PM Messi Osuna MD [facility] BS AMB       Requested Prescriptions     Pending Prescriptions Disp Refills    sertraline (ZOLOFT) 100 MG tablet 135 tablet 3     Sig: Take 1.5 tablets by mouth daily

## 2024-11-26 DIAGNOSIS — F98.8 ADD (ATTENTION DEFICIT DISORDER) WITHOUT HYPERACTIVITY: ICD-10-CM

## 2024-11-27 RX ORDER — SERTRALINE HYDROCHLORIDE 100 MG/1
150 TABLET, FILM COATED ORAL DAILY
Qty: 135 TABLET | Refills: 3 | Status: SHIPPED | OUTPATIENT
Start: 2024-11-27

## 2024-11-27 RX ORDER — DEXTROAMPHETAMINE SACCHARATE, AMPHETAMINE ASPARTATE MONOHYDRATE, DEXTROAMPHETAMINE SULFATE AND AMPHETAMINE SULFATE 6.25; 6.25; 6.25; 6.25 MG/1; MG/1; MG/1; MG/1
25 CAPSULE, EXTENDED RELEASE ORAL EVERY MORNING
Qty: 30 CAPSULE | Refills: 0 | Status: SHIPPED | OUTPATIENT
Start: 2024-11-27 | End: 2024-12-27

## 2024-11-27 RX ORDER — CARBAMAZEPINE 200 MG/1
200 TABLET ORAL
Qty: 1 TABLET | Refills: 0 | Status: SHIPPED | OUTPATIENT
Start: 2024-11-27

## 2024-11-27 NOTE — TELEPHONE ENCOUNTER
PCP: Amy Nolasco MD    LAST OFFICE VISIT: 02/28/2024    Medication Contract   CSA Signed: 2/28/2024  Last UDS: 2/15/2024        [facility]  report reviewed    The last fill date was 11/09/2024 for a 30 d/s qty 30      LAST REFILL PER CHART:  Medication:amphetamine-dextroamphetamine (ADDERALL XR) 25 MG extended release capsule   Ordered On:10/24/2024  Instructions:Take 1 capsule by mouth every morning for 30 days. Max Daily Amount: 25 mg   Dispense:30 capsules  Refills:0      LAST REFILL PER CHART:  Medication:carBAMazepine (TEGRETOL) 200 MG tablet   Ordered On:02/28/2024  Instructions:Take 1 tablet by mouth every morning (before breakfast)   Dispense:1  Refills:0      LAST REFILL PER CHART:  Medication:sertraline (ZOLOFT) 100 MG tablet   Ordered On:11/12/2024  Instructions:Take 1.5 tablets by mouth daily   Dispense:135 tablets  Refills:3      Future Appointments   Date Time Provider Department Center   1/15/2025  2:00 PM Amy Nolasco MD [facility]   3/20/2025  2:00 PM Messi Osuna MD [facility] BS AMB

## 2024-12-03 ENCOUNTER — TELEPHONE (OUTPATIENT)
Facility: CLINIC | Age: 44
End: 2024-12-03

## 2024-12-03 DIAGNOSIS — G50.0 TRIGEMINAL NEURALGIA: Primary | ICD-10-CM

## 2024-12-03 RX ORDER — CARBAMAZEPINE 200 MG/1
200 TABLET ORAL
Qty: 90 TABLET | Refills: 0 | Status: SHIPPED | OUTPATIENT
Start: 2024-12-03

## 2024-12-03 NOTE — TELEPHONE ENCOUNTER
The pharmacy needs clarity on the medication Tegretol. The instructions are 1 tablet every morning, and the dispense quantity is for 1 tablet.

## 2025-01-10 DIAGNOSIS — F98.8 ADD (ATTENTION DEFICIT DISORDER) WITHOUT HYPERACTIVITY: ICD-10-CM

## 2025-01-13 RX ORDER — DEXTROAMPHETAMINE SACCHARATE, AMPHETAMINE ASPARTATE MONOHYDRATE, DEXTROAMPHETAMINE SULFATE AND AMPHETAMINE SULFATE 6.25; 6.25; 6.25; 6.25 MG/1; MG/1; MG/1; MG/1
25 CAPSULE, EXTENDED RELEASE ORAL EVERY MORNING
Qty: 30 CAPSULE | Refills: 0 | Status: SHIPPED | OUTPATIENT
Start: 2025-01-13 | End: 2025-02-12

## 2025-01-13 NOTE — TELEPHONE ENCOUNTER
PCP: Amy Nolasco MD    LAST OFFICE VISIT: NONE    CSA Signed: 2/28/2024  Last UDS: 2/15/2024       [facility]  report reviewed    The last fill date was 12/06/2024 for a 30 d/s qty 30      LAST REFILL PER CHART:  Medication:amphetamine-dextroamphetamine (ADDERALL XR) 25 MG extended release capsule   Ordered On:11/27/2024  Instructions: Take 1 capsule by mouth every morning for 30 days. Max Daily Amount: 25 mg   Dispense:30 capsules  Refills:0      Future Appointments   Date Time Provider Department Center   1/15/2025  2:00 PM Amy Nolasco MD [facility]   3/20/2025  2:00 PM Messi Osuna MD [facility] BS AMB

## 2025-01-15 ENCOUNTER — OFFICE VISIT (OUTPATIENT)
Facility: CLINIC | Age: 45
End: 2025-01-15
Payer: COMMERCIAL

## 2025-01-15 ENCOUNTER — HOSPITAL ENCOUNTER (OUTPATIENT)
Facility: HOSPITAL | Age: 45
Setting detail: SPECIMEN
Discharge: HOME | End: 2025-01-18

## 2025-01-15 VITALS
OXYGEN SATURATION: 100 % | BODY MASS INDEX: 24.73 KG/M2 | HEIGHT: 69 IN | HEART RATE: 62 BPM | TEMPERATURE: 98.7 F | DIASTOLIC BLOOD PRESSURE: 70 MMHG | WEIGHT: 167 LBS | SYSTOLIC BLOOD PRESSURE: 112 MMHG | RESPIRATION RATE: 14 BRPM

## 2025-01-15 DIAGNOSIS — F41.1 GENERALIZED ANXIETY DISORDER: ICD-10-CM

## 2025-01-15 DIAGNOSIS — Z00.00 ENCOUNTER FOR ROUTINE HISTORY AND PHYSICAL EXAM IN FEMALE: Primary | ICD-10-CM

## 2025-01-15 DIAGNOSIS — Z51.81 ENCOUNTER FOR THERAPEUTIC DRUG LEVEL MONITORING: Primary | ICD-10-CM

## 2025-01-15 DIAGNOSIS — E78.2 MIXED HYPERLIPIDEMIA: ICD-10-CM

## 2025-01-15 DIAGNOSIS — I47.10 PAROXYSMAL SVT (SUPRAVENTRICULAR TACHYCARDIA) (HCC): ICD-10-CM

## 2025-01-15 DIAGNOSIS — D50.0 IRON DEFICIENCY ANEMIA DUE TO CHRONIC BLOOD LOSS: ICD-10-CM

## 2025-01-15 DIAGNOSIS — G43.009 MIGRAINE WITHOUT AURA AND WITHOUT STATUS MIGRAINOSUS, NOT INTRACTABLE: ICD-10-CM

## 2025-01-15 DIAGNOSIS — R41.840 ATTENTION OR CONCENTRATION DEFICIT: ICD-10-CM

## 2025-01-15 DIAGNOSIS — I10 PRIMARY HYPERTENSION: ICD-10-CM

## 2025-01-15 DIAGNOSIS — Z51.81 ENCOUNTER FOR THERAPEUTIC DRUG LEVEL MONITORING: ICD-10-CM

## 2025-01-15 DIAGNOSIS — N92.0 MENORRHAGIA WITH REGULAR CYCLE: ICD-10-CM

## 2025-01-15 DIAGNOSIS — G50.0 TRIGEMINAL NEURALGIA: ICD-10-CM

## 2025-01-15 LAB
BASOPHILS # BLD: 1 % (ref 0–2)
BASOPHILS ABSOLUTE: 0 K/UL (ref 0–0.2)
EOSINOPHIL # BLD: 2 % (ref 0–6)
EOSINOPHILS ABSOLUTE: 0.1 K/UL (ref 0–0.5)
HCT VFR BLD CALC: 37.7 % (ref 35.1–46.5)
HEMOGLOBIN: 11.1 G/DL (ref 11.7–15.5)
LYMPHOCYTES # BLD: 22 % (ref 20–45)
LYMPHOCYTES ABSOLUTE: 1.5 K/UL (ref 1–4.8)
MCH RBC QN AUTO: 24 PG (ref 26–34)
MCHC RBC AUTO-ENTMCNC: 29 G/DL (ref 31–36)
MCV RBC AUTO: 82 FL (ref 80–99)
MONOCYTES ABSOLUTE: 0.3 K/UL (ref 0.1–1)
MONOCYTES: 5 % (ref 3–12)
NEUTROPHILS ABSOLUTE: 4.7 K/UL (ref 1.8–7.7)
NEUTROPHILS SEGMENTED: 70 % (ref 40–75)
PDW BLD-RTO: 14.6 % (ref 10–15.5)
PLATELET # BLD: 328 K/UL (ref 140–440)
PMV BLD AUTO: 10.6 FL (ref 9–13)
RBC # BLD: 4.58 M/UL (ref 3.8–5.2)
WBC # BLD: 6.7 K/UL (ref 4–11)

## 2025-01-15 PROCEDURE — 3078F DIAST BP <80 MM HG: CPT | Performed by: INTERNAL MEDICINE

## 2025-01-15 PROCEDURE — 99001 SPECIMEN HANDLING PT-LAB: CPT

## 2025-01-15 PROCEDURE — 3074F SYST BP LT 130 MM HG: CPT | Performed by: INTERNAL MEDICINE

## 2025-01-15 PROCEDURE — 99396 PREV VISIT EST AGE 40-64: CPT | Performed by: INTERNAL MEDICINE

## 2025-01-15 RX ORDER — ESCITALOPRAM OXALATE 10 MG/1
TABLET ORAL
Qty: 200 TABLET | Refills: 0 | Status: SHIPPED | OUTPATIENT
Start: 2025-01-15 | End: 2025-05-06

## 2025-01-15 RX ORDER — CARBAMAZEPINE 200 MG/1
200 TABLET ORAL
Qty: 90 TABLET | Refills: 3 | Status: SHIPPED | OUTPATIENT
Start: 2025-01-15

## 2025-01-15 SDOH — ECONOMIC STABILITY: FOOD INSECURITY: WITHIN THE PAST 12 MONTHS, YOU WORRIED THAT YOUR FOOD WOULD RUN OUT BEFORE YOU GOT MONEY TO BUY MORE.: NEVER TRUE

## 2025-01-15 SDOH — ECONOMIC STABILITY: FOOD INSECURITY: WITHIN THE PAST 12 MONTHS, THE FOOD YOU BOUGHT JUST DIDN'T LAST AND YOU DIDN'T HAVE MONEY TO GET MORE.: NEVER TRUE

## 2025-01-15 ASSESSMENT — PATIENT HEALTH QUESTIONNAIRE - PHQ9
1. LITTLE INTEREST OR PLEASURE IN DOING THINGS: NOT AT ALL
SUM OF ALL RESPONSES TO PHQ QUESTIONS 1-9: 0
2. FEELING DOWN, DEPRESSED OR HOPELESS: NOT AT ALL
SUM OF ALL RESPONSES TO PHQ QUESTIONS 1-9: 0
SUM OF ALL RESPONSES TO PHQ9 QUESTIONS 1 & 2: 0

## 2025-01-15 NOTE — PROGRESS NOTES
Eula Saldaña presents today for   Chief Complaint   Patient presents with    Annual Exam       \"Have you been to the ER, urgent care clinic since your last visit?  Hospitalized since your last visit?\"    Yes  10/30/2024/Mary Romero/ Anxiety and chest pain    “Have you seen or consulted any other health care providers outside of [facility] since your last visit?”    NO       Have you had a mammogram?”   Scheduled for next week    Date of last Mammogram: 12/29/2022      “Have you had a pap smear?”    NO    Date of last Cervical Cancer screen (HPV or PAP): 2/26/2019          
and Tegretol 400 mg every morning and 200 mg nightly.  She has not had recurrence of her trigeminal neuralgia pain since initiating treatment with gabapentin.    On 9/18/2021, she reports that she developed palpitations with lightheadedness/ presyncope and recorded on her Apple Watch a narrow complex tachycardia at rate of 230 bpm.  She reports that she spontaneously reverted to sinus rhythm. She presented to [facility] ED for evaluation and evaluation showed evidence of a microcytic anemia (Hb 11.4/HCT 37.8 MCV 75.4), K 3.7, creatinine 0.87/eGFR > 60, magnesium 2.1; EKG showed sinus rhythm at 99 bpm.  She was diagnosed with paroxysmal SVT and started on metoprolol succinate 12.5 mg daily and continue treatment for 30 days but did not refill.  She was referred to follow-up with cardiology but did not schedule. She reports that she has a long history of intermittent palpitations for many years and states that she did have a Holter monitor placed in the past which was negative.  She denies any recurrence of palpitations since the episode.     She states that she has a long history dating back for at least 10 years where she has been experiencing chronic pain, particularly in her lower back with bilateral numbness and tingling in both legs, and in her neck. She was evaluated in [id number] by Dr. Walker, and underwent EMG/ NCS reportedly showing right C7-8 radiculopathy, right L5 radiculopathy and bilateral sensory polyneuropathy. She had a lumbar MRI at [facility] showing mild degenerative disc changes. She also had a brain MRI which was negative. She was reportedly found to have a low B12 level and was treated with a supplement. She has used naproxen intermittently over the years for discomfort. In 5/2017, she presented to Dr. Mckeon for evaluation and underwent repeat EMG/ NCS, which were reportedly \"normal\". She also had MACIEL which was strongly positive 1:1280 in speckled pattern. RF, ESR, TSH, and folate were normal. B12 was

## 2025-01-15 NOTE — PATIENT INSTRUCTIONS
professional. [employer] disclaims any warranty or liability for your use of this information.

## 2025-01-16 LAB
A/G RATIO: 1.4 RATIO (ref 1.1–2.6)
ALBUMIN: 4.2 G/DL (ref 3.5–5)
ALP BLD-CCNC: 103 U/L (ref 25–115)
ALT SERPL-CCNC: 12 U/L (ref 5–40)
ANION GAP SERPL CALCULATED.3IONS-SCNC: 11 MMOL/L (ref 3–15)
AST SERPL-CCNC: 15 U/L (ref 10–37)
BILIRUB SERPL-MCNC: 0.2 MG/DL (ref 0.2–1.2)
BUN BLDV-MCNC: 13 MG/DL (ref 6–22)
CALCIUM SERPL-MCNC: 9.5 MG/DL (ref 8.4–10.5)
CANNABINOIDS: NORMAL
CHLORIDE BLD-SCNC: 100 MMOL/L (ref 98–110)
CHOLESTEROL, TOTAL: 283 MG/DL (ref 110–200)
CHOLESTEROL/HDL RATIO: 6.2 (ref 0–5)
CO2: 26 MMOL/L (ref 20–32)
COCAINE: NORMAL
CREAT SERPL-MCNC: 1 MG/DL (ref 0.5–1.2)
FERRITIN: 6 NG/ML (ref 10–291)
GFR, ESTIMATED: >60 ML/MIN/1.73 SQ.M.
GLOBULIN: 2.9 G/DL (ref 2–4)
GLUCOSE: 94 MG/DL (ref 70–99)
HDLC SERPL-MCNC: 46 MG/DL
LDL CHOLESTEROL: 203 MG/DL (ref 50–99)
LDL/HDL RATIO: 4.4
MAGNESIUM: 2 MG/DL (ref 1.6–2.5)
NON-HDL CHOLESTEROL: 237 MG/DL
POTASSIUM SERPL-SCNC: 4 MMOL/L (ref 3.5–5.5)
SENTARA SPECIMEN COLLECTION: NORMAL
SODIUM BLD-SCNC: 137 MMOL/L (ref 133–145)
SPECIFIC GRAVITY UA: NORMAL (ref 4.5–8)
TOTAL PROTEIN: 7.1 G/DL (ref 6.4–8.3)
TRIGL SERPL-MCNC: 167 MG/DL (ref 40–149)
VITAMIN D 25-HYDROXY: 41.6 NG/ML (ref 32–100)
VLDLC SERPL CALC-MCNC: 33 MG/DL (ref 8–30)

## 2025-01-19 PROBLEM — R76.8 POSITIVE ANA (ANTINUCLEAR ANTIBODY): Status: RESOLVED | Noted: 2017-05-01 | Resolved: 2025-01-19

## 2025-01-19 PROBLEM — F41.1 GENERALIZED ANXIETY DISORDER: Status: ACTIVE | Noted: 2021-05-11

## 2025-01-19 PROBLEM — D50.9 IRON DEFICIENCY ANEMIA: Status: ACTIVE | Noted: 2021-05-15

## 2025-01-22 ENCOUNTER — TELEPHONE (OUTPATIENT)
Facility: CLINIC | Age: 45
End: 2025-01-22

## 2025-01-23 ENCOUNTER — PATIENT MESSAGE (OUTPATIENT)
Facility: CLINIC | Age: 45
End: 2025-01-23

## 2025-01-23 DIAGNOSIS — F41.1 GENERALIZED ANXIETY DISORDER: Primary | ICD-10-CM

## 2025-01-27 RX ORDER — ESCITALOPRAM OXALATE 20 MG/1
20 TABLET ORAL DAILY
Qty: 90 TABLET | Refills: 3 | Status: SHIPPED | OUTPATIENT
Start: 2025-01-27

## 2025-01-27 RX ORDER — ESCITALOPRAM OXALATE 10 MG/1
TABLET ORAL
Qty: 21 TABLET | Refills: 0 | Status: SHIPPED | OUTPATIENT
Start: 2025-01-27 | End: 2025-02-17

## 2025-01-27 NOTE — TELEPHONE ENCOUNTER
I have sent in 2 prescriptions:    Escitalopram 10 mg with instructions to gradually increase dose over 3 weeks.  Once completed,  she should begin taking Escitalopram 20 mg tablets daily.  Please let her know.

## 2025-02-09 DIAGNOSIS — F98.8 ADD (ATTENTION DEFICIT DISORDER) WITHOUT HYPERACTIVITY: ICD-10-CM

## 2025-02-10 RX ORDER — DEXTROAMPHETAMINE SACCHARATE, AMPHETAMINE ASPARTATE MONOHYDRATE, DEXTROAMPHETAMINE SULFATE AND AMPHETAMINE SULFATE 6.25; 6.25; 6.25; 6.25 MG/1; MG/1; MG/1; MG/1
25 CAPSULE, EXTENDED RELEASE ORAL EVERY MORNING
Qty: 30 CAPSULE | Refills: 0 | Status: SHIPPED | OUTPATIENT
Start: 2025-02-10 | End: 2025-03-12

## 2025-02-10 NOTE — TELEPHONE ENCOUNTER
PCP: Amy Nolasco MD    LAST OFFICE VISIT: 01/15/2025    CSA: 01/15/2025  UDS: 01/15/2025     [facility]  report reviewed    The last fill date was 01/13/2025 for a 30 d/s qty 30      LAST REFILL PER CHART:  Medication:amphetamine-dextroamphetamine (ADDERALL XR) 25 MG extended release capsule   Ordered On:01/13/2025  Instructions:Take 1 capsule by mouth every morning for 30 days. Max Daily Amount: 25 mg   Dispense:30 capsules  Refills:0      Future Appointments   Date Time Provider Department Center   2/20/2025  1:00 PM [facility]D [facility]   3/20/2025  2:00 PM Messi Osuna MD [facility] BS [facility]   4/24/2025 10:30 AM [facility]

## 2025-03-12 DIAGNOSIS — F98.8 ADD (ATTENTION DEFICIT DISORDER) WITHOUT HYPERACTIVITY: ICD-10-CM

## 2025-03-12 RX ORDER — DEXTROAMPHETAMINE SACCHARATE, AMPHETAMINE ASPARTATE MONOHYDRATE, DEXTROAMPHETAMINE SULFATE AND AMPHETAMINE SULFATE 6.25; 6.25; 6.25; 6.25 MG/1; MG/1; MG/1; MG/1
25 CAPSULE, EXTENDED RELEASE ORAL EVERY MORNING
Qty: 30 CAPSULE | Refills: 0 | Status: SHIPPED | OUTPATIENT
Start: 2025-03-12 | End: 2025-04-11

## 2025-03-12 NOTE — TELEPHONE ENCOUNTER
PCP: Amy Nolasco MD    LAST OFFICE VISIT: 01/15/2025  CSA: 01/15/2025  UDS: 01/15/2025   [facility]  report reviewed    The last fill date was 02/10/2025 for a 30 d/s qty 30    LAST REFILL PER CHART:  Medication:amphetamine-dextroamphetamine (ADDERALL XR) 25 MG extended release capsule   Ordered On:02/10/2025  Instructions:Take 1 capsule by mouth every morning for 30 days   Dispense:30 capsules  Refills:0      Future Appointments   Date Time Provider Department Center   3/20/2025  2:00 PM Messi Osuna MD [facility] BS AMB   3/20/2025  3:40 PM [facility] RM 3 3D [facility]   4/24/2025 10:30 AM [facility]

## 2025-03-20 ENCOUNTER — OFFICE VISIT (OUTPATIENT)
Age: 45
End: 2025-03-20
Payer: COMMERCIAL

## 2025-03-20 ENCOUNTER — HOSPITAL ENCOUNTER (OUTPATIENT)
Facility: HOSPITAL | Age: 45
Discharge: HOME | End: 2025-03-23
Payer: COMMERCIAL

## 2025-03-20 VITALS
BODY MASS INDEX: 25.03 KG/M2 | OXYGEN SATURATION: 98 % | SYSTOLIC BLOOD PRESSURE: 110 MMHG | WEIGHT: 169 LBS | DIASTOLIC BLOOD PRESSURE: 78 MMHG | HEIGHT: 69 IN | HEART RATE: 76 BPM

## 2025-03-20 VITALS — HEIGHT: 69 IN | BODY MASS INDEX: 24.44 KG/M2 | WEIGHT: 165 LBS

## 2025-03-20 DIAGNOSIS — I49.3 PVC (PREMATURE VENTRICULAR CONTRACTION): ICD-10-CM

## 2025-03-20 DIAGNOSIS — I49.1 PAC (PREMATURE ATRIAL CONTRACTION): ICD-10-CM

## 2025-03-20 DIAGNOSIS — I47.10 PAROXYSMAL SVT (SUPRAVENTRICULAR TACHYCARDIA): Primary | ICD-10-CM

## 2025-03-20 DIAGNOSIS — Z12.31 SCREENING MAMMOGRAM FOR BREAST CANCER: ICD-10-CM

## 2025-03-20 PROCEDURE — 77063 BREAST TOMOSYNTHESIS BI: CPT

## 2025-03-20 PROCEDURE — 99214 OFFICE O/P EST MOD 30 MIN: CPT | Performed by: INTERNAL MEDICINE

## 2025-03-20 PROCEDURE — 3078F DIAST BP <80 MM HG: CPT | Performed by: INTERNAL MEDICINE

## 2025-03-20 PROCEDURE — 3074F SYST BP LT 130 MM HG: CPT | Performed by: INTERNAL MEDICINE

## 2025-03-20 ASSESSMENT — PATIENT HEALTH QUESTIONNAIRE - PHQ9
SUM OF ALL RESPONSES TO PHQ QUESTIONS 1-9: 0
SUM OF ALL RESPONSES TO PHQ QUESTIONS 1-9: 0
2. FEELING DOWN, DEPRESSED OR HOPELESS: NOT AT ALL
SUM OF ALL RESPONSES TO PHQ QUESTIONS 1-9: 0
1. LITTLE INTEREST OR PLEASURE IN DOING THINGS: NOT AT ALL
SUM OF ALL RESPONSES TO PHQ QUESTIONS 1-9: 0

## 2025-03-21 NOTE — PROGRESS NOTES
HPI:  45-year-old female here for followup. She was initially referred by Dr. Nolasco for SVT. She keeps a logbook of her tracings at home. She does have PACs as well as PVCs and remote documented SVT back in 2021 at 230 beats per minute.  Since I last saw her about as year ago, she does have the occasional ectopic beats, both PACs and PVCs but no sustained SVT. She is taking Propranolol, very well controlled. No new chest pain, PND, orthopnea or edema.  Her migraines and neuralgia are reasonably controlled.     Impression:   1. Documented SVT up to 230 beats per minute 2021, controlled on propranolol 80 mg daily and has been controlled since then. We are deferring ablation.    2. PVCs, occasional ventricular bigeminy as well as atrial bigeminy.   3. History of hypertension, controlled.   4. Bigeminal neuralgia.    5. History of migraines.   6. History of ADHD, on Adderall.   7. Chronic back pain.     Plan:   She has both PACs and PVCs. She had SVT last documented sustained in 2021. Given the rare nature and well controlled on propranolol 80 mg daily, I would continue with this. I talked about long term potential ablative therapy if needed, but again since she has done so well with medical therapy, we will continue to monitor.  All questions answered.            
    General:   Well developed, well groomed.    Head/Neck:   No obvious jugular venous distention     No obvious carotid pulsations.      No evidence of xanthelasma.  Lungs:   No respiratory distress.      Good respiratory effort  Heart:  No obvious pulsations  Abdomen:   No guarding  Extremities:   Well perfused    No significant edema.    Neurological:   Alert and oriented to person, place, time.      No focal neurological deficit visually.  Skin:   No obvious rash

## 2025-04-06 DIAGNOSIS — F98.8 ADD (ATTENTION DEFICIT DISORDER) WITHOUT HYPERACTIVITY: ICD-10-CM

## 2025-04-07 RX ORDER — DEXTROAMPHETAMINE SACCHARATE, AMPHETAMINE ASPARTATE MONOHYDRATE, DEXTROAMPHETAMINE SULFATE AND AMPHETAMINE SULFATE 6.25; 6.25; 6.25; 6.25 MG/1; MG/1; MG/1; MG/1
25 CAPSULE, EXTENDED RELEASE ORAL EVERY MORNING
Qty: 30 CAPSULE | Refills: 0 | Status: SHIPPED | OUTPATIENT
Start: 2025-04-07 | End: 2025-05-07

## 2025-04-07 NOTE — TELEPHONE ENCOUNTER
PCP: Amy Nolasco MD    LAST OFFICE VISIT: 01/15/2025  CSA: 01/15/2025   UDS: 01/15/2025     [facility]  report reviewed  The last fill date was 03/12/2025 for a 30 d/s qty 30      LAST REFILL PER CHART:  Medication:amphetamine-dextroamphetamine (ADDERALL XR) 25 MG extended release capsule   Ordered On:03/12/2025  Instructions:Take 1 capsule by mouth every morning for 30 days. Max Daily Amount: 25 mg   Dispense:30 capsules  Refills:0    Future Appointments   Date Time Provider Department Center   4/24/2025 10:30 AM Amy Nolasco MD [facility] DEP   3/25/2026  1:20 PM Messi Osuna MD [facility] BS AMB

## 2025-05-05 DIAGNOSIS — F98.8 ADD (ATTENTION DEFICIT DISORDER) WITHOUT HYPERACTIVITY: ICD-10-CM

## 2025-05-05 RX ORDER — DEXTROAMPHETAMINE SACCHARATE, AMPHETAMINE ASPARTATE MONOHYDRATE, DEXTROAMPHETAMINE SULFATE AND AMPHETAMINE SULFATE 6.25; 6.25; 6.25; 6.25 MG/1; MG/1; MG/1; MG/1
25 CAPSULE, EXTENDED RELEASE ORAL EVERY MORNING
Qty: 30 CAPSULE | Refills: 0 | Status: SHIPPED | OUTPATIENT
Start: 2025-05-05 | End: 2025-06-04

## 2025-05-05 NOTE — TELEPHONE ENCOUNTER
PCP: Amy Nolasco MD    LAST OFFICE VISIT: 01/15/2025  CSA: 01/15/2025  UDS: 01/15/2025   [facility]  report reviewed  The last fill date was 04/08/2025 for a 30 d/s qty 30      LAST REFILL PER CHART:  Medication:amphetamine-dextroamphetamine (ADDERALL XR) 25 MG extended release capsule   Ordered On:04/07/2025  Instructions:Take 1 capsule by mouth every morning for 30 days. Max Daily Amount: 25 mg   Dispense:30 capsules  Refills:0    Future Appointments   Date Time Provider Department Center   3/25/2026  1:20 PM Messi Osuna MD [facility] BS AMB

## 2025-06-08 DIAGNOSIS — F98.8 ADD (ATTENTION DEFICIT DISORDER) WITHOUT HYPERACTIVITY: ICD-10-CM

## 2025-06-08 RX ORDER — MONTELUKAST SODIUM 10 MG/1
10 TABLET ORAL DAILY
Qty: 90 TABLET | Refills: 3 | Status: SHIPPED | OUTPATIENT
Start: 2025-06-08

## 2025-06-09 RX ORDER — DEXTROAMPHETAMINE SACCHARATE, AMPHETAMINE ASPARTATE MONOHYDRATE, DEXTROAMPHETAMINE SULFATE AND AMPHETAMINE SULFATE 6.25; 6.25; 6.25; 6.25 MG/1; MG/1; MG/1; MG/1
25 CAPSULE, EXTENDED RELEASE ORAL EVERY MORNING
Qty: 30 CAPSULE | Refills: 0 | Status: SHIPPED | OUTPATIENT
Start: 2025-06-09 | End: 2025-07-09

## 2025-06-09 NOTE — TELEPHONE ENCOUNTER
PCP: Amy Nolasco MD    LAST OFFICE VISIT:01/15/2025    CSA: 01/15/2025   UDS: 01/15/2025     [facility]  report reviewed  The last fill date was 05/05/2025 for a 30 d/s qty 30      LAST REFILL PER CHART:  Medication:amphetamine-dextroamphetamine (ADDERALL XR) 25 MG extended release capsule    Ordered On:05/05/2025  Instructions:Take 1 capsule by mouth every morning for 30 days. Max Daily Amount: 25 mg   Dispense:30 capsules  Refills:0    Future Appointments   Date Time Provider Department Center   1/5/2026 11:00 AM [facility] LAB VISIT [facility] DEP   1/8/2026 12:30 PM Amy Nolasco MD [facility] DEP   3/25/2026  1:20 PM Messi Osuna MD [facility] BS AMB

## 2025-07-13 DIAGNOSIS — F98.8 ADD (ATTENTION DEFICIT DISORDER) WITHOUT HYPERACTIVITY: ICD-10-CM

## 2025-07-14 RX ORDER — DEXTROAMPHETAMINE SACCHARATE, AMPHETAMINE ASPARTATE MONOHYDRATE, DEXTROAMPHETAMINE SULFATE AND AMPHETAMINE SULFATE 6.25; 6.25; 6.25; 6.25 MG/1; MG/1; MG/1; MG/1
25 CAPSULE, EXTENDED RELEASE ORAL EVERY MORNING
Qty: 30 CAPSULE | Refills: 0 | Status: SHIPPED | OUTPATIENT
Start: 2025-07-14 | End: 2025-08-13

## 2025-07-14 NOTE — TELEPHONE ENCOUNTER
PCP: Amy Nolasco MD    LAST OFFICE VISIT: 01/15/2025    CSA: 01/15/2025  UDS: 01/15/2025     [facility]  report reviewed  The last fill date was 06/09/2025 for a 30 d/s qty 30      LAST REFILL PER CHART:  Medication:amphetamine-dextroamphetamine (ADDERALL XR) 25 MG extended release capsule    Ordered On:06/09/2025  Instructions:Take 1 capsule by mouth every morning for 30 days. Max Daily Amount: 25 mg   Dispense:30 capsules  Refills:0    Future Appointments   Date Time Provider Department Center   1/5/2026 11:00 AM C LAB VISIT [facility] DEP   1/8/2026 12:30 PM Amy Nolasco MD [facility] DEP   3/25/2026  1:20 PM Messi Osuna MD [facility] BS AMB

## 2025-08-11 DIAGNOSIS — G50.0 TRIGEMINAL NEURALGIA: ICD-10-CM

## 2025-08-11 RX ORDER — GABAPENTIN 600 MG/1
TABLET ORAL
Qty: 90 TABLET | Refills: 0 | Status: SHIPPED | OUTPATIENT
Start: 2025-08-11 | End: 2025-11-09

## 2025-08-12 DIAGNOSIS — F98.8 ADD (ATTENTION DEFICIT DISORDER) WITHOUT HYPERACTIVITY: ICD-10-CM

## 2025-08-12 RX ORDER — DEXTROAMPHETAMINE SACCHARATE, AMPHETAMINE ASPARTATE MONOHYDRATE, DEXTROAMPHETAMINE SULFATE AND AMPHETAMINE SULFATE 6.25; 6.25; 6.25; 6.25 MG/1; MG/1; MG/1; MG/1
25 CAPSULE, EXTENDED RELEASE ORAL EVERY MORNING
Qty: 30 CAPSULE | Refills: 0 | Status: SHIPPED | OUTPATIENT
Start: 2025-08-12 | End: 2025-09-11

## 2025-09-04 ENCOUNTER — PATIENT MESSAGE (OUTPATIENT)
Facility: CLINIC | Age: 45
End: 2025-09-04